# Patient Record
Sex: FEMALE | Race: WHITE | NOT HISPANIC OR LATINO | Employment: OTHER | URBAN - METROPOLITAN AREA
[De-identification: names, ages, dates, MRNs, and addresses within clinical notes are randomized per-mention and may not be internally consistent; named-entity substitution may affect disease eponyms.]

---

## 2017-06-19 ENCOUNTER — APPOINTMENT (OUTPATIENT)
Dept: PHYSICAL THERAPY | Facility: MEDICAL CENTER | Age: 72
End: 2017-06-19
Payer: MEDICARE

## 2017-06-19 PROCEDURE — 97163 PT EVAL HIGH COMPLEX 45 MIN: CPT

## 2017-06-19 PROCEDURE — G8990 OTHER PT/OT CURRENT STATUS: HCPCS

## 2017-06-19 PROCEDURE — 97112 NEUROMUSCULAR REEDUCATION: CPT

## 2017-06-19 PROCEDURE — G8991 OTHER PT/OT GOAL STATUS: HCPCS

## 2017-06-23 ENCOUNTER — APPOINTMENT (OUTPATIENT)
Dept: PHYSICAL THERAPY | Facility: MEDICAL CENTER | Age: 72
End: 2017-06-23
Payer: MEDICARE

## 2017-06-23 PROCEDURE — 97112 NEUROMUSCULAR REEDUCATION: CPT

## 2017-06-23 PROCEDURE — 97140 MANUAL THERAPY 1/> REGIONS: CPT

## 2017-06-23 PROCEDURE — 97010 HOT OR COLD PACKS THERAPY: CPT

## 2017-06-26 ENCOUNTER — APPOINTMENT (OUTPATIENT)
Dept: PHYSICAL THERAPY | Facility: MEDICAL CENTER | Age: 72
End: 2017-06-26
Payer: MEDICARE

## 2017-06-26 PROCEDURE — 97112 NEUROMUSCULAR REEDUCATION: CPT

## 2017-06-26 PROCEDURE — 97110 THERAPEUTIC EXERCISES: CPT

## 2017-07-03 ENCOUNTER — APPOINTMENT (OUTPATIENT)
Dept: PHYSICAL THERAPY | Facility: MEDICAL CENTER | Age: 72
End: 2017-07-03
Payer: MEDICARE

## 2017-07-03 PROCEDURE — 97112 NEUROMUSCULAR REEDUCATION: CPT

## 2017-07-03 PROCEDURE — 97140 MANUAL THERAPY 1/> REGIONS: CPT

## 2017-07-07 ENCOUNTER — APPOINTMENT (OUTPATIENT)
Dept: PHYSICAL THERAPY | Facility: MEDICAL CENTER | Age: 72
End: 2017-07-07
Payer: MEDICARE

## 2017-07-07 PROCEDURE — 97140 MANUAL THERAPY 1/> REGIONS: CPT

## 2017-07-07 PROCEDURE — 97110 THERAPEUTIC EXERCISES: CPT

## 2017-07-17 ENCOUNTER — APPOINTMENT (OUTPATIENT)
Dept: PHYSICAL THERAPY | Facility: MEDICAL CENTER | Age: 72
End: 2017-07-17
Payer: MEDICARE

## 2017-07-17 PROCEDURE — 97140 MANUAL THERAPY 1/> REGIONS: CPT

## 2017-07-17 PROCEDURE — 97110 THERAPEUTIC EXERCISES: CPT

## 2017-07-20 ENCOUNTER — APPOINTMENT (OUTPATIENT)
Dept: PHYSICAL THERAPY | Facility: MEDICAL CENTER | Age: 72
End: 2017-07-20
Payer: MEDICARE

## 2017-07-20 PROCEDURE — 97110 THERAPEUTIC EXERCISES: CPT

## 2017-07-20 PROCEDURE — 97140 MANUAL THERAPY 1/> REGIONS: CPT

## 2017-07-24 ENCOUNTER — APPOINTMENT (OUTPATIENT)
Dept: PHYSICAL THERAPY | Facility: MEDICAL CENTER | Age: 72
End: 2017-07-24
Payer: MEDICARE

## 2017-07-24 PROCEDURE — 97110 THERAPEUTIC EXERCISES: CPT

## 2017-07-24 PROCEDURE — 97140 MANUAL THERAPY 1/> REGIONS: CPT

## 2017-07-28 ENCOUNTER — APPOINTMENT (OUTPATIENT)
Dept: PHYSICAL THERAPY | Facility: MEDICAL CENTER | Age: 72
End: 2017-07-28
Payer: MEDICARE

## 2017-08-01 ENCOUNTER — APPOINTMENT (OUTPATIENT)
Dept: PHYSICAL THERAPY | Facility: MEDICAL CENTER | Age: 72
End: 2017-08-01
Payer: MEDICARE

## 2017-08-01 PROCEDURE — 97110 THERAPEUTIC EXERCISES: CPT

## 2017-08-01 PROCEDURE — 97140 MANUAL THERAPY 1/> REGIONS: CPT

## 2017-08-01 PROCEDURE — 97112 NEUROMUSCULAR REEDUCATION: CPT

## 2017-08-04 ENCOUNTER — APPOINTMENT (OUTPATIENT)
Dept: LAB | Facility: HOSPITAL | Age: 72
End: 2017-08-04
Payer: MEDICARE

## 2017-08-04 ENCOUNTER — APPOINTMENT (OUTPATIENT)
Dept: PHYSICAL THERAPY | Facility: MEDICAL CENTER | Age: 72
End: 2017-08-04
Payer: MEDICARE

## 2017-08-04 ENCOUNTER — OFFICE VISIT (OUTPATIENT)
Dept: URGENT CARE | Facility: MEDICAL CENTER | Age: 72
End: 2017-08-04
Payer: MEDICARE

## 2017-08-04 DIAGNOSIS — R31.29 OTHER MICROSCOPIC HEMATURIA: ICD-10-CM

## 2017-08-04 DIAGNOSIS — R39.15 URGENCY OF URINATION: ICD-10-CM

## 2017-08-04 PROCEDURE — 97112 NEUROMUSCULAR REEDUCATION: CPT

## 2017-08-04 PROCEDURE — 81002 URINALYSIS NONAUTO W/O SCOPE: CPT

## 2017-08-04 PROCEDURE — 99203 OFFICE O/P NEW LOW 30 MIN: CPT

## 2017-08-04 PROCEDURE — 97140 MANUAL THERAPY 1/> REGIONS: CPT

## 2017-08-04 PROCEDURE — G0463 HOSPITAL OUTPT CLINIC VISIT: HCPCS

## 2017-08-04 PROCEDURE — 97110 THERAPEUTIC EXERCISES: CPT

## 2017-08-04 PROCEDURE — 87086 URINE CULTURE/COLONY COUNT: CPT

## 2017-08-05 LAB — BACTERIA UR CULT: NORMAL

## 2017-08-07 ENCOUNTER — APPOINTMENT (OUTPATIENT)
Dept: PHYSICAL THERAPY | Facility: MEDICAL CENTER | Age: 72
End: 2017-08-07
Payer: MEDICARE

## 2017-08-07 PROCEDURE — 97112 NEUROMUSCULAR REEDUCATION: CPT

## 2017-08-07 PROCEDURE — 97140 MANUAL THERAPY 1/> REGIONS: CPT

## 2017-08-11 ENCOUNTER — APPOINTMENT (OUTPATIENT)
Dept: PHYSICAL THERAPY | Facility: MEDICAL CENTER | Age: 72
End: 2017-08-11
Payer: MEDICARE

## 2017-08-11 PROCEDURE — G8991 OTHER PT/OT GOAL STATUS: HCPCS

## 2017-08-11 PROCEDURE — 97112 NEUROMUSCULAR REEDUCATION: CPT

## 2017-08-11 PROCEDURE — G8992 OTHER PT/OT  D/C STATUS: HCPCS

## 2017-08-11 PROCEDURE — 97140 MANUAL THERAPY 1/> REGIONS: CPT

## 2017-08-17 ENCOUNTER — ALLSCRIPTS OFFICE VISIT (OUTPATIENT)
Dept: OTHER | Facility: OTHER | Age: 72
End: 2017-08-17

## 2017-08-17 LAB
BILIRUB UR QL STRIP: ABNORMAL
CLARITY UR: ABNORMAL
COLOR UR: YELLOW
GLUCOSE (HISTORICAL): ABNORMAL
HGB UR QL STRIP.AUTO: ABNORMAL
KETONES UR STRIP-MCNC: ABNORMAL MG/DL
LEUKOCYTE ESTERASE UR QL STRIP: ABNORMAL
NITRITE UR QL STRIP: ABNORMAL
PH UR STRIP.AUTO: 5.5 [PH]
PROT UR STRIP-MCNC: ABNORMAL MG/DL
SP GR UR STRIP.AUTO: <=1.005
UROBILINOGEN UR QL STRIP.AUTO: 0.2

## 2017-09-15 ENCOUNTER — ANESTHESIA EVENT (OUTPATIENT)
Dept: PERIOP | Facility: HOSPITAL | Age: 72
End: 2017-09-15
Payer: MEDICARE

## 2017-09-15 RX ORDER — SODIUM CHLORIDE 9 MG/ML
125 INJECTION, SOLUTION INTRAVENOUS CONTINUOUS
Status: CANCELLED | OUTPATIENT
Start: 2017-10-04

## 2017-09-18 ENCOUNTER — APPOINTMENT (OUTPATIENT)
Dept: PREADMISSION TESTING | Facility: HOSPITAL | Age: 72
End: 2017-09-18
Payer: MEDICARE

## 2017-09-18 ENCOUNTER — HOSPITAL ENCOUNTER (OUTPATIENT)
Dept: NON INVASIVE DIAGNOSTICS | Facility: HOSPITAL | Age: 72
Discharge: HOME | End: 2017-09-18
Attending: UROLOGY
Payer: MEDICARE

## 2017-09-18 ENCOUNTER — HOSPITAL ENCOUNTER (OUTPATIENT)
Dept: ULTRASOUND IMAGING | Facility: HOSPITAL | Age: 72
Discharge: HOME | End: 2017-09-18
Attending: UROLOGY
Payer: MEDICARE

## 2017-09-18 ENCOUNTER — APPOINTMENT (OUTPATIENT)
Dept: LAB | Facility: HOSPITAL | Age: 72
End: 2017-09-18
Attending: UROLOGY
Payer: MEDICARE

## 2017-09-18 ENCOUNTER — TRANSCRIBE ORDERS (OUTPATIENT)
Dept: ADMINISTRATIVE | Facility: HOSPITAL | Age: 72
End: 2017-09-18

## 2017-09-18 VITALS
HEART RATE: 74 BPM | HEIGHT: 61 IN | BODY MASS INDEX: 24.73 KG/M2 | RESPIRATION RATE: 18 BRPM | DIASTOLIC BLOOD PRESSURE: 60 MMHG | WEIGHT: 131 LBS | SYSTOLIC BLOOD PRESSURE: 110 MMHG | TEMPERATURE: 96.5 F

## 2017-09-18 DIAGNOSIS — R31.29 OTHER MICROSCOPIC HEMATURIA: ICD-10-CM

## 2017-09-18 DIAGNOSIS — R31.21 ASYMPTOMATIC MICROSCOPIC HEMATURIA: ICD-10-CM

## 2017-09-18 DIAGNOSIS — R31.21 ASYMPTOMATIC MICROSCOPIC HEMATURIA: Primary | ICD-10-CM

## 2017-09-18 LAB
ALBUMIN SERPL BCP-MCNC: 3.8 G/DL (ref 3.5–5)
ALP SERPL-CCNC: 47 U/L (ref 46–116)
ALT SERPL W P-5'-P-CCNC: 26 U/L (ref 12–78)
ANION GAP SERPL CALCULATED.3IONS-SCNC: 5 MMOL/L (ref 4–13)
AST SERPL W P-5'-P-CCNC: 23 U/L (ref 5–45)
ATRIAL RATE: 64 BPM
BASOPHILS # BLD AUTO: 0.01 THOUSANDS/ΜL (ref 0–0.1)
BASOPHILS NFR BLD AUTO: 0 % (ref 0–1)
BILIRUB SERPL-MCNC: 0.19 MG/DL (ref 0.2–1)
BUN SERPL-MCNC: 11 MG/DL (ref 5–25)
CALCIUM SERPL-MCNC: 9.4 MG/DL (ref 8.3–10.1)
CHLORIDE SERPL-SCNC: 102 MMOL/L (ref 100–108)
CO2 SERPL-SCNC: 31 MMOL/L (ref 21–32)
CREAT SERPL-MCNC: 0.56 MG/DL (ref 0.6–1.3)
EOSINOPHIL # BLD AUTO: 0.09 THOUSAND/ΜL (ref 0–0.61)
EOSINOPHIL NFR BLD AUTO: 1 % (ref 0–6)
ERYTHROCYTE [DISTWIDTH] IN BLOOD BY AUTOMATED COUNT: 12 % (ref 11.6–15.1)
GFR SERPL CREATININE-BSD FRML MDRD: 95 ML/MIN/1.73SQ M
GLUCOSE SERPL-MCNC: 90 MG/DL (ref 65–140)
HCT VFR BLD AUTO: 41.2 % (ref 34.8–46.1)
HGB BLD-MCNC: 13.6 G/DL (ref 11.5–15.4)
LYMPHOCYTES # BLD AUTO: 1.82 THOUSANDS/ΜL (ref 0.6–4.47)
LYMPHOCYTES NFR BLD AUTO: 27 % (ref 14–44)
MCH RBC QN AUTO: 32 PG (ref 26.8–34.3)
MCHC RBC AUTO-ENTMCNC: 33 G/DL (ref 31.4–37.4)
MCV RBC AUTO: 97 FL (ref 82–98)
MONOCYTES # BLD AUTO: 0.52 THOUSAND/ΜL (ref 0.17–1.22)
MONOCYTES NFR BLD AUTO: 8 % (ref 4–12)
NEUTROPHILS # BLD AUTO: 4.23 THOUSANDS/ΜL (ref 1.85–7.62)
NEUTS SEG NFR BLD AUTO: 64 % (ref 43–75)
NRBC BLD AUTO-RTO: 0 /100 WBCS
P AXIS: 61 DEGREES
PLATELET # BLD AUTO: 274 THOUSANDS/UL (ref 149–390)
PMV BLD AUTO: 9.4 FL (ref 8.9–12.7)
POTASSIUM SERPL-SCNC: 3.7 MMOL/L (ref 3.5–5.3)
PR INTERVAL: 184 MS
PROT SERPL-MCNC: 7.7 G/DL (ref 6.4–8.2)
QRS AXIS: -55 DEGREES
QRSD INTERVAL: 110 MS
QT INTERVAL: 396 MS
QTC INTERVAL: 408 MS
RBC # BLD AUTO: 4.25 MILLION/UL (ref 3.81–5.12)
SODIUM SERPL-SCNC: 138 MMOL/L (ref 136–145)
T WAVE AXIS: 26 DEGREES
VENTRICULAR RATE: 64 BPM
WBC # BLD AUTO: 6.67 THOUSAND/UL (ref 4.31–10.16)

## 2017-09-18 PROCEDURE — 85025 COMPLETE CBC W/AUTO DIFF WBC: CPT

## 2017-09-18 PROCEDURE — 93005 ELECTROCARDIOGRAM TRACING: CPT

## 2017-09-18 PROCEDURE — 76770 US EXAM ABDO BACK WALL COMP: CPT

## 2017-09-18 PROCEDURE — 80053 COMPREHEN METABOLIC PANEL: CPT

## 2017-09-18 PROCEDURE — 36415 COLL VENOUS BLD VENIPUNCTURE: CPT

## 2017-09-18 PROCEDURE — 87086 URINE CULTURE/COLONY COUNT: CPT

## 2017-09-18 RX ORDER — LEVOTHYROXINE SODIUM 0.05 MG/1
75 TABLET ORAL
COMMUNITY
End: 2019-10-23 | Stop reason: ALTCHOICE

## 2017-09-18 RX ORDER — ACETAMINOPHEN 500 MG
500-1000 TABLET ORAL EVERY 6 HOURS PRN
COMMUNITY
End: 2018-06-08

## 2017-09-18 RX ORDER — PHENAZOPYRIDINE HYDROCHLORIDE 95 MG/1
95 TABLET ORAL 3 TIMES DAILY PRN
COMMUNITY
End: 2018-06-08

## 2017-09-18 RX ORDER — OMEGA-3 FATTY ACIDS CAP DELAYED RELEASE 1000 MG 1000 MG
1 CAPSULE DELAYED RELEASE ORAL DAILY
COMMUNITY
End: 2018-03-28

## 2017-09-18 RX ORDER — CLONAZEPAM 0.5 MG/1
0.5 TABLET ORAL AS NEEDED
COMMUNITY
End: 2018-06-08

## 2017-09-18 NOTE — ANESTHESIA PREPROCEDURE EVALUATION
Review of Systems/Medical History  Patient summary reviewed  Chart reviewed  History of anesthetic complications (does well with Propofol sedation) PONV    Cardiovascular  Negative cardio ROS    Pulmonary  Negative pulmonary ROS ,        GI/Hepatic  Negative GI/hepatic ROS            Comment: Chronic IC     Endo/Other  History of thyroid disease , hypothyroidism,      GYN  Negative gynecology ROS          Hematology  Negative hematology ROS      Musculoskeletal  Negative musculoskeletal ROS        Neurology      Comment: fibromyalgia Psychology   Negative psychology ROS            Physical Exam    Airway    Mallampati score: II  TM Distance: >3 FB  Neck ROM: full     Dental   Comment: Many crowns including all of top center  One temporary upper crown #11, prominent overbite,     Cardiovascular  Comment: Negative ROS, Rhythm: regular, Rate: normal, Cardiovascular exam normal    Pulmonary  Pulmonary exam normal Breath sounds clear to auscultation,     Other Findings        Anesthesia Plan  ASA Score- 2       Anesthesia Type- IV sedation with anesthesia  Comment: Says "have benadryl ready"    Sometimes gets a little "itchy" with IVS  Patient says she does well with ondansetron      Induction- intravenous      Informed Consent  Anesthetic plan and risks discussed with patient

## 2017-09-19 LAB — BACTERIA UR CULT: NORMAL

## 2017-10-04 ENCOUNTER — HOSPITAL ENCOUNTER (OUTPATIENT)
Facility: HOSPITAL | Age: 72
Setting detail: OUTPATIENT SURGERY
Discharge: HOME | End: 2017-10-04
Attending: UROLOGY | Admitting: UROLOGY
Payer: MEDICARE

## 2017-10-04 ENCOUNTER — ANESTHESIA (OUTPATIENT)
Dept: PERIOP | Facility: HOSPITAL | Age: 72
End: 2017-10-04
Payer: MEDICARE

## 2017-10-04 VITALS
OXYGEN SATURATION: 99 % | SYSTOLIC BLOOD PRESSURE: 115 MMHG | HEIGHT: 61 IN | DIASTOLIC BLOOD PRESSURE: 69 MMHG | BODY MASS INDEX: 24 KG/M2 | HEART RATE: 73 BPM | TEMPERATURE: 97.7 F | RESPIRATION RATE: 16 BRPM | WEIGHT: 127.13 LBS

## 2017-10-04 RX ORDER — MAGNESIUM HYDROXIDE 1200 MG/15ML
LIQUID ORAL AS NEEDED
Status: DISCONTINUED | OUTPATIENT
Start: 2017-10-04 | End: 2017-10-04 | Stop reason: HOSPADM

## 2017-10-04 RX ORDER — PROPOFOL 10 MG/ML
INJECTION, EMULSION INTRAVENOUS CONTINUOUS PRN
Status: DISCONTINUED | OUTPATIENT
Start: 2017-10-04 | End: 2017-10-04 | Stop reason: SURG

## 2017-10-04 RX ORDER — PROPOFOL 10 MG/ML
INJECTION, EMULSION INTRAVENOUS AS NEEDED
Status: DISCONTINUED | OUTPATIENT
Start: 2017-10-04 | End: 2017-10-04 | Stop reason: SURG

## 2017-10-04 RX ORDER — ONDANSETRON 2 MG/ML
INJECTION INTRAMUSCULAR; INTRAVENOUS AS NEEDED
Status: DISCONTINUED | OUTPATIENT
Start: 2017-10-04 | End: 2017-10-04 | Stop reason: SURG

## 2017-10-04 RX ORDER — SODIUM CHLORIDE 9 MG/ML
125 INJECTION, SOLUTION INTRAVENOUS CONTINUOUS
Status: DISCONTINUED | OUTPATIENT
Start: 2017-10-04 | End: 2017-10-04 | Stop reason: HOSPADM

## 2017-10-04 RX ORDER — MIDAZOLAM HYDROCHLORIDE 1 MG/ML
INJECTION INTRAMUSCULAR; INTRAVENOUS AS NEEDED
Status: DISCONTINUED | OUTPATIENT
Start: 2017-10-04 | End: 2017-10-04 | Stop reason: SURG

## 2017-10-04 RX ORDER — FENTANYL CITRATE 50 UG/ML
INJECTION, SOLUTION INTRAMUSCULAR; INTRAVENOUS AS NEEDED
Status: DISCONTINUED | OUTPATIENT
Start: 2017-10-04 | End: 2017-10-04 | Stop reason: SURG

## 2017-10-04 RX ORDER — ONDANSETRON 2 MG/ML
4 INJECTION INTRAMUSCULAR; INTRAVENOUS EVERY 6 HOURS PRN
Status: DISCONTINUED | OUTPATIENT
Start: 2017-10-04 | End: 2017-10-04 | Stop reason: HOSPADM

## 2017-10-04 RX ORDER — HYDROCODONE BITARTRATE AND ACETAMINOPHEN 5; 325 MG/1; MG/1
1 TABLET ORAL EVERY 6 HOURS PRN
Status: DISCONTINUED | OUTPATIENT
Start: 2017-10-04 | End: 2017-10-04 | Stop reason: HOSPADM

## 2017-10-04 RX ORDER — FENTANYL CITRATE 50 UG/ML
25 INJECTION, SOLUTION INTRAMUSCULAR; INTRAVENOUS
Status: DISCONTINUED | OUTPATIENT
Start: 2017-10-04 | End: 2017-10-04 | Stop reason: HOSPADM

## 2017-10-04 RX ORDER — PHENAZOPYRIDINE HYDROCHLORIDE 200 MG/1
200 TABLET, FILM COATED ORAL
Status: DISCONTINUED | OUTPATIENT
Start: 2017-10-04 | End: 2017-10-04 | Stop reason: HOSPADM

## 2017-10-04 RX ADMIN — DEXAMETHASONE SODIUM PHOSPHATE 10 MG: 10 INJECTION INTRAMUSCULAR; INTRAVENOUS at 08:44

## 2017-10-04 RX ADMIN — PROPOFOL 50 MG: 10 INJECTION, EMULSION INTRAVENOUS at 08:26

## 2017-10-04 RX ADMIN — MIDAZOLAM HYDROCHLORIDE 2 MG: 1 INJECTION, SOLUTION INTRAMUSCULAR; INTRAVENOUS at 08:22

## 2017-10-04 RX ADMIN — HYDROCODONE BITARTRATE AND ACETAMINOPHEN 0.5 TABLET: 5; 325 TABLET ORAL at 11:27

## 2017-10-04 RX ADMIN — PROPOFOL 50 MCG/KG/MIN: 10 INJECTION, EMULSION INTRAVENOUS at 08:28

## 2017-10-04 RX ADMIN — SODIUM CHLORIDE 125 ML/HR: 0.9 INJECTION, SOLUTION INTRAVENOUS at 07:22

## 2017-10-04 RX ADMIN — CEFAZOLIN SODIUM 2000 MG: 2 SOLUTION INTRAVENOUS at 08:06

## 2017-10-04 RX ADMIN — CEFAZOLIN SODIUM 2000 MG: 2 SOLUTION INTRAVENOUS at 08:22

## 2017-10-04 RX ADMIN — ONDANSETRON HYDROCHLORIDE 4 MG: 2 INJECTION, SOLUTION INTRAVENOUS at 08:44

## 2017-10-04 RX ADMIN — HYDROCODONE BITARTRATE AND ACETAMINOPHEN 0.5 TABLET: 5; 325 TABLET ORAL at 10:41

## 2017-10-04 RX ADMIN — PHENAZOPYRIDINE HYDROCHLORIDE 200 MG: 200 TABLET ORAL at 09:55

## 2017-10-04 RX ADMIN — FENTANYL CITRATE 50 MCG: 50 INJECTION, SOLUTION INTRAMUSCULAR; INTRAVENOUS at 08:28

## 2017-10-04 NOTE — OP NOTE
OPERATIVE REPORT  PATIENT NAME: Shaina Barber    :  1945  MRN: [id number]  Pt Location: [facility] OR ROOM 03    SURGERY DATE: 10/4/2017    Surgeon(s) and Role:     * Constanza Lyle MD - Primary    Preop Diagnosis:  Microscopic hematuria [R31 29]    Post-Op Diagnosis Codes:     * Microscopic hematuria [R31 29]    Procedure:  Rigid cystoscopy in the operating room    Specimen(s):  None    Estimated Blood Loss:   None    Drains:  None       Anesthesia Type:   IV Sedation with Anesthesia    Operative Indications:  Microscopic hematuria [R31 29]      Operative Findings:  Normal bladder and urethra    Complications:   None    Procedure and Technique:  The patient is a 57-year-old female with a history of interstitial cystitis  She also has chronic pelvic pain  She has had InterStim generator placed help control the urgency frequency due to the IC  Urinalysis shows microscopic hematuria  She has not undergone cystoscopy for quite a while  She could not tolerate in the office so we will do it under mac in the operating room  The risks of bleeding infection and damage to the urinary tract were explained, along with increased pain symptoms  The patient was brought to the operating room and identified properly  Mac anesthesia was induced the patient was prepped and draped in dorsal lithotomy position in the usual fashion a time-out was performed  Cystoscopy was carried out with a 22 Frisian cystoscopy sheath and 30 and 70 degree lens  The urethra was normal without stricture  The bladder was smooth, not trabeculated there are no stones tumors or other the orifices were orthotopic and intact  The bladder appeared to be of normal capacity  Once this was done, the bladder was drained     I was present for the entire procedure and A qualified resident physician was not available    Patient Disposition:  PACU  and hemodynamically stable    SIGNATURE: Constanza Lyle MD  DATE: 2017  TIME: 8:29 AM

## 2018-01-08 ENCOUNTER — ALLSCRIPTS OFFICE VISIT (OUTPATIENT)
Dept: OTHER | Facility: OTHER | Age: 73
End: 2018-01-08

## 2018-01-08 DIAGNOSIS — N20.0 CALCULUS OF KIDNEY: ICD-10-CM

## 2018-01-08 LAB
BILIRUB UR QL STRIP: NEGATIVE
CLARITY UR: NORMAL
COLOR UR: YELLOW
GLUCOSE (HISTORICAL): NEGATIVE
HGB UR QL STRIP.AUTO: NORMAL
KETONES UR STRIP-MCNC: NEGATIVE MG/DL
LEUKOCYTE ESTERASE UR QL STRIP: NEGATIVE
NITRITE UR QL STRIP: NEGATIVE
PH UR STRIP.AUTO: 5 [PH]
PROT UR STRIP-MCNC: NEGATIVE MG/DL
SP GR UR STRIP.AUTO: 1
UROBILINOGEN UR QL STRIP.AUTO: 0.2

## 2018-01-10 ENCOUNTER — HOSPITAL ENCOUNTER (OUTPATIENT)
Dept: CT IMAGING | Facility: HOSPITAL | Age: 73
Discharge: HOME | End: 2018-01-10
Attending: UROLOGY
Payer: MEDICARE

## 2018-01-10 DIAGNOSIS — N20.0 CALCULUS OF KIDNEY: ICD-10-CM

## 2018-01-10 PROCEDURE — 74176 CT ABD & PELVIS W/O CONTRAST: CPT

## 2018-01-14 VITALS
BODY MASS INDEX: 23.37 KG/M2 | DIASTOLIC BLOOD PRESSURE: 78 MMHG | WEIGHT: 127 LBS | SYSTOLIC BLOOD PRESSURE: 116 MMHG | HEIGHT: 62 IN

## 2018-01-23 VITALS
SYSTOLIC BLOOD PRESSURE: 118 MMHG | BODY MASS INDEX: 22.63 KG/M2 | WEIGHT: 123 LBS | DIASTOLIC BLOOD PRESSURE: 64 MMHG | HEIGHT: 62 IN

## 2018-03-28 ENCOUNTER — OFFICE VISIT (OUTPATIENT)
Dept: GASTROENTEROLOGY | Facility: MEDICAL CENTER | Age: 73
End: 2018-03-28
Payer: MEDICARE

## 2018-03-28 VITALS
DIASTOLIC BLOOD PRESSURE: 72 MMHG | HEIGHT: 61 IN | WEIGHT: 122 LBS | TEMPERATURE: 97 F | SYSTOLIC BLOOD PRESSURE: 106 MMHG | BODY MASS INDEX: 23.03 KG/M2 | HEART RATE: 83 BPM

## 2018-03-28 DIAGNOSIS — K21.9 GASTROESOPHAGEAL REFLUX DISEASE, ESOPHAGITIS PRESENCE NOT SPECIFIED: ICD-10-CM

## 2018-03-28 DIAGNOSIS — R14.0 ABDOMINAL BLOATING: ICD-10-CM

## 2018-03-28 DIAGNOSIS — K59.01 SLOW TRANSIT CONSTIPATION: Primary | ICD-10-CM

## 2018-03-28 DIAGNOSIS — R11.2 NAUSEA AND VOMITING, INTRACTABILITY OF VOMITING NOT SPECIFIED, UNSPECIFIED VOMITING TYPE: ICD-10-CM

## 2018-03-28 PROBLEM — R39.15 URINARY URGENCY: Status: ACTIVE | Noted: 2017-08-04

## 2018-03-28 PROBLEM — R30.0 DIFFICULT OR PAINFUL URINATION: Status: ACTIVE | Noted: 2017-08-04

## 2018-03-28 PROBLEM — N30.10 IC (INTERSTITIAL CYSTITIS): Status: ACTIVE | Noted: 2017-08-04

## 2018-03-28 PROCEDURE — 99204 OFFICE O/P NEW MOD 45 MIN: CPT | Performed by: INTERNAL MEDICINE

## 2018-03-28 RX ORDER — DICYCLOMINE HYDROCHLORIDE 10 MG/1
10 CAPSULE ORAL
Qty: 120 CAPSULE | Refills: 3 | Status: SHIPPED | OUTPATIENT
Start: 2018-03-28 | End: 2018-04-20 | Stop reason: SDUPTHER

## 2018-03-28 RX ORDER — POLYETHYLENE GLYCOL 3350 17 G/17G
17 POWDER, FOR SOLUTION ORAL DAILY
Qty: 14 EACH | Refills: 0 | Status: SHIPPED | OUTPATIENT
Start: 2018-03-28 | End: 2018-04-20 | Stop reason: SDUPTHER

## 2018-03-28 RX ORDER — BENZONATATE 100 MG/1
100 CAPSULE ORAL 3 TIMES DAILY
COMMUNITY
Start: 2018-03-20 | End: 2018-03-30

## 2018-03-28 RX ORDER — GUAIFENESIN 600 MG
600 TABLET, EXTENDED RELEASE 12 HR ORAL
Status: ON HOLD | COMMUNITY
Start: 2018-03-20 | End: 2018-05-25 | Stop reason: ALTCHOICE

## 2018-03-28 NOTE — LETTER
March 28, 2018     Pamela Daniels [address] [address] [address] [address]    Patient: Berenice Wiseman   YOB: 1945   Date of Visit: 3/28/2018       Dear Dr Rebecca Carranza:    Thank you for referring Anisha Frank to me for evaluation  Below are my notes for this consultation  If you have questions, please do not hesitate to call me  I look forward to following your patient along with you  Sincerely,      JAYANT An  [facility]  Mobile: [phone]  Available on Volance  [email or web address]  [email or web address]             CC: No Recipients  Daniel Pereira MD  3/28/2018 10:29 AM  Sign at close encounter  [facility] - Outpatient Consultation  Berenice Wiseman 81 y o  female MRN: [id number]  Encounter: [id number]          ASSESSMENT AND PLAN:      1  Slow Transit constipation  2  Abdominal bloating  Long standing constipation contributed to by recent viral illness + pelvic floor dysfunction  She follows with Dr Ren in gynecology for pelvic floor and has a visit with him tomorrow  Start miralax, instructed to titrate the dose to effect  Prescribed bentyl as needed, she will take a half dose and monitor for signs of CNS disturbances which she is at risk for, given her age  She is encouraged to increase her hydration and dietary fiber, goal 25-40 grams/day  I have given her handouts on a low FODMAP diet + dietary fiber  - polyethylene glycol (MIRALAX) 17 g packet; Take 17 g by mouth daily  Dispense: 14 each; Refill: 0  - dicyclomine (BENTYL) 10 mg capsule; Take 1 capsule (10 mg total) by mouth 4 (four) times a day (before meals and at bedtime) for 30 days  Dispense: 120 capsule; Refill: 3    3   Nausea and vomiting, intractability of vomiting not specified, unspecified vomiting type  Concern for gastroparesis given slow transit constipation  Evaluate with gastric emptying study  Symptoms may also be related to underlying constipation, which we are treating  - NM gastric emptying; Future  - polyethylene glycol (MIRALAX) 17 g packet; Take 17 g by mouth daily  Dispense: 14 each; Refill: 0    4  Gastroesophageal reflux disease, esophagitis presence not specified  Anti-reflux measures discussed, she will evaluate her food triggers and attempt to avoid these  ______________________________________________________________________    HPI:      Patient is a 78year old female referred to me for evaluation of constipation, abdominal bloating, nausea/vomiting, and reflux  She has a past medical history of fibromyalgia, myofascial pain, interstitial cystitis, and pelvic floor dysfunction  She relates a "life long history of GI disturbances " She has constipation, with bowel movements spontaneously occurring once every 2-3 days and with passage of hard stools, straining, and sensation of incomplete evacuation  She has increased nausea and vomiting associated with oral intake, primarily after rich meals  She limits her diet to frozen peas and carrots and avoids dairy products due to concern of a "dairy allergy " Constipation is associated with gas/bloating, symptoms of early satiety, and frequent nausea with occasional vomiting of undigested foods  She took multiple pills of dulcolax several days ago, which resulted in explosive diarrhea  She also had severe diarrhea with attempts of linzess, but is unsure of which dosage she was previously prescribed  She frequently uses cachecin gum, which she purchases online to treat her constipation at home  Most recently, her chronic symptoms were exacerbated by diagnosis of influenza B and a trip to [facility]  She reports feeling dizzy and lightheaded at times related to dehydration from the recent viral illness  She has also experienced increased reflux symptoms since her influenza diagnosis  She denies unintentional weight loss        REVIEW OF SYSTEMS:    CONSTITUTIONAL: Denies any fever, chills, rigors, and weight loss   HEENT: No earache or tinnitus  Denies hearing loss or visual disturbances  CARDIOVASCULAR: No chest pain or palpitations  RESPIRATORY: Denies any cough, hemoptysis, shortness of breath or dyspnea on exertion  GASTROINTESTINAL: As noted in the History of Present Illness  GENITOURINARY: No problems with urination  Denies any hematuria or dysuria  NEUROLOGIC: No dizziness or vertigo, denies headaches  MUSCULOSKELETAL: + diffuse muscle pain from fibromyalgia/myofascial pain  SKIN: Denies skin rashes or itching  ENDOCRINE: Denies excessive thirst  Denies intolerance to heat or cold  PSYCHOSOCIAL: Denies depression or anxiety  Denies any recent memory loss  Historical Information   Past Medical History:   Diagnosis Date    Allergy to adhesive tape     tears skin    Arthritis     Fibromyalgia, primary     Hypothyroid     Interstitial cystitis     has interStim    Irritable bowel syndrome     Kidney stone     Microscopic hematuria     Other allergy, initial encounter     allergy to blue dye    PONV (postoperative nausea and vomiting)     and pruritis    Rosacea     Spondylisthesis     Wears dentures     lower partial    Wears glasses      Past Surgical History:   Procedure Laterality Date    APPENDECTOMY      CATARACT EXTRACTION Bilateral     COLONOSCOPY      HYSTERECTOMY      OTHER SURGICAL HISTORY Left     limb lengthening    OTHER SURGICAL HISTORY      interStim    OTHER SURGICAL HISTORY      tendon contracture right hip    [place] CYSTOURETHROSCOPY N/A 10/4/2017    Procedure: CYSTOSCOPY;  Surgeon: Dustin Sanchez MD;  Location: [facility];  Service: Urology    TONSILLECTOMY       Social History   History   Alcohol Use    Yes     Comment: few x year     History   Drug Use No     History   Smoking Status    Never Smoker   Smokeless Tobacco    Never Used     No family history on file      Meds/Allergies       Current Outpatient Prescriptions:     acetaminophen (TYLENOL) 500 mg tablet    benzonatate (TESSALON PERLES) 100 mg capsule    Calcium Carbonate (CALCIUM 600 PO)    clonazePAM (KlonoPIN) 0 5 mg tablet    guaiFENesin (MUCINEX) 600 mg 12 hr tablet    levothyroxine 75 mcg tablet    phenazopyridine (PYRIDIUM) 95 MG tablet    dicyclomine (BENTYL) 10 mg capsule    polyethylene glycol (MIRALAX) 17 g packet    Allergies   Allergen Reactions    Sulfa Antibiotics Rash    Blue Dyes (Parenteral) Hives    Nitrofurantoin     Other      Adhesive tape causes blisters    Sulfate            Objective     Blood pressure 106/72, pulse 83, temperature (!) 97 °F (36 1 °C), temperature source Tympanic, height 5' 1" (1 549 m), weight 55 3 kg (122 lb), not currently breastfeeding  Body mass index is 23 05 kg/m²  PHYSICAL EXAM:      General Appearance:   Alert, cooperative, no distress   HEENT:   Normocephalic, atraumatic, anicteric      Neck:  Supple, symmetrical, trachea midline   Lungs:   Clear to auscultation bilaterally; no rales, rhonchi or wheezing; respirations unlabored    Heart[de-identified]   Regular rate and rhythm; no murmur, rub, or gallop     Abdomen:   Soft, + mild epigastric tenderness, non-distended; normal bowel sounds; no masses, no organomegaly    Genitalia:   Deferred    Rectal:   Deferred    Extremities:  No cyanosis, clubbing or edema    Pulses:  2+ and symmetric    Skin:  No jaundice, rashes, or lesions    Lymph nodes:  No palpable cervical lymphadenopathy        Lab Results:   Lab Results   Component Value Date    WBC 6 67 09/18/2017    HGB 13 6 09/18/2017    HCT 41 2 09/18/2017    MCV 97 09/18/2017     09/18/2017     Lab Results   Component Value Date     09/18/2017    K 3 7 09/18/2017     09/18/2017    CO2 31 09/18/2017    ANIONGAP 5 09/18/2017    BUN 11 09/18/2017    CREATININE 0 56 (L) 09/18/2017    GLUCOSE 90 09/18/2017    CALCIUM 9 4 09/18/2017    AST 23 09/18/2017    ALT 26 09/18/2017    ALKPHOS 47 09/18/2017    PROT 7 7 09/18/2017    BILITOT 0 19 (L) 09/18/2017    EGFR 95 09/18/2017         Radiology Results:   No results found

## 2018-03-28 NOTE — PROGRESS NOTES
[facility] - Outpatient Consultation  Therese Ham 81 y o  female MRN: [id number]  Encounter: [id number]          ASSESSMENT AND PLAN:      1  Slow Transit constipation  2  Abdominal bloating  Long standing constipation contributed to by recent viral illness + pelvic floor dysfunction  She follows with Dr Hector Maya in gynecology for pelvic floor and has a visit with him tomorrow  Start miralax, instructed to titrate the dose to effect  I gave her samples (4 pills each) of 72 mcg and 145 mcg of Linzess, she will try these if the miralax is not effective and get back to me if she would like a prescription  Prescribed bentyl as needed, she will take a half dose and monitor for signs of CNS disturbances which she is at risk for, given her age  She is encouraged to increase her hydration and dietary fiber, goal 25-40 grams/day  I have given her handouts on a low FODMAP diet + dietary fiber  - polyethylene glycol (MIRALAX) 17 g packet; Take 17 g by mouth daily  Dispense: 14 each; Refill: 0  - dicyclomine (BENTYL) 10 mg capsule; Take 1 capsule (10 mg total) by mouth 4 (four) times a day (before meals and at bedtime) for 30 days  Dispense: 120 capsule; Refill: 3    3  Nausea and vomiting, intractability of vomiting not specified, unspecified vomiting type  Concern for gastroparesis given slow transit constipation  Evaluate with gastric emptying study  Symptoms may also be related to underlying constipation, which we are treating  - NM gastric emptying; Future  - polyethylene glycol (MIRALAX) 17 g packet; Take 17 g by mouth daily  Dispense: 14 each; Refill: 0    4   Gastroesophageal reflux disease, esophagitis presence not specified  Anti-reflux measures discussed, she will evaluate her food triggers and attempt to avoid these  ______________________________________________________________________    HPI:      Patient is a 78year old female referred to me for evaluation of constipation, abdominal bloating, nausea/vomiting, and reflux  She has a past medical history of fibromyalgia, myofascial pain, interstitial cystitis, and pelvic floor dysfunction  She relates a "life long history of GI disturbances " She has constipation, with bowel movements spontaneously occurring once every 2-3 days and with passage of hard stools, straining, and sensation of incomplete evacuation  She has increased nausea and vomiting associated with oral intake, primarily after rich meals  She limits her diet to frozen peas and carrots and avoids dairy products due to concern of a "dairy allergy " Constipation is associated with gas/bloating, symptoms of early satiety, and frequent nausea with occasional vomiting of undigested foods  She took multiple pills of dulcolax several days ago, which resulted in explosive diarrhea  She also had severe diarrhea with attempts of linzess, but is unsure of which dosage she was previously prescribed  She frequently uses cachecin gum, which she purchases online to treat her constipation at home  Most recently, her chronic symptoms were exacerbated by diagnosis of influenza B and a trip to [facility]  She reports feeling dizzy and lightheaded at times related to dehydration from the recent viral illness  She has also experienced increased reflux symptoms since her influenza diagnosis  She denies unintentional weight loss  REVIEW OF SYSTEMS:    CONSTITUTIONAL: Denies any fever, chills, rigors, and weight loss  HEENT: No earache or tinnitus  Denies hearing loss or visual disturbances  CARDIOVASCULAR: No chest pain or palpitations  RESPIRATORY: Denies any cough, hemoptysis, shortness of breath or dyspnea on exertion  GASTROINTESTINAL: As noted in the History of Present Illness  GENITOURINARY: No problems with urination  Denies any hematuria or dysuria  NEUROLOGIC: No dizziness or vertigo, denies headaches     MUSCULOSKELETAL: + diffuse muscle pain from fibromyalgia/myofascial pain  SKIN: Denies skin rashes or itching  ENDOCRINE: Denies excessive thirst  Denies intolerance to heat or cold  PSYCHOSOCIAL: Denies depression or anxiety  Denies any recent memory loss  Historical Information   Past Medical History:   Diagnosis Date    Allergy to adhesive tape     tears skin    Arthritis     Fibromyalgia, primary     Hypothyroid     Interstitial cystitis     has interStim    Irritable bowel syndrome     Kidney stone     Microscopic hematuria     Other allergy, initial encounter     allergy to blue dye    PONV (postoperative nausea and vomiting)     and pruritis    Rosacea     Spondylisthesis     Wears dentures     lower partial    Wears glasses      Past Surgical History:   Procedure Laterality Date    APPENDECTOMY      CATARACT EXTRACTION Bilateral     COLONOSCOPY      HYSTERECTOMY      OTHER SURGICAL HISTORY Left     limb lengthening    OTHER SURGICAL HISTORY      interStim    OTHER SURGICAL HISTORY      tendon contracture right hip    [place] CYSTOURETHROSCOPY N/A 10/4/2017    Procedure: CYSTOSCOPY;  Surgeon: Kendra Beck MD;  Location: [facility] OR;  Service: Urology    TONSILLECTOMY       Social History   History   Alcohol Use    Yes     Comment: few x year     History   Drug Use No     History   Smoking Status    Never Smoker   Smokeless Tobacco    Never Used     No family history on file      Meds/Allergies       Current Outpatient Prescriptions:     acetaminophen (TYLENOL) 500 mg tablet    benzonatate (TESSALON PERLES) 100 mg capsule    Calcium Carbonate (CALCIUM 600 PO)    clonazePAM (KlonoPIN) 0 5 mg tablet    guaiFENesin (MUCINEX) 600 mg 12 hr tablet    levothyroxine 75 mcg tablet    phenazopyridine (PYRIDIUM) 95 MG tablet    dicyclomine (BENTYL) 10 mg capsule    polyethylene glycol (MIRALAX) 17 g packet    Allergies   Allergen Reactions    Sulfa Antibiotics Rash    Blue Dyes (Parenteral) Hives    Nitrofurantoin     Other      Adhesive tape causes blisters    Sulfate            Objective     Blood pressure 106/72, pulse 83, temperature (!) 97 °F (36 1 °C), temperature source Tympanic, height 5' 1" (1 549 m), weight 55 3 kg (122 lb), not currently breastfeeding  Body mass index is 23 05 kg/m²  PHYSICAL EXAM:      General Appearance:   Alert, cooperative, no distress   HEENT:   Normocephalic, atraumatic, anicteric      Neck:  Supple, symmetrical, trachea midline   Lungs:   Clear to auscultation bilaterally; no rales, rhonchi or wheezing; respirations unlabored    Heart[de-identified]   Regular rate and rhythm; no murmur, rub, or gallop  Abdomen:   Soft, + mild epigastric tenderness, non-distended; normal bowel sounds; no masses, no organomegaly    Genitalia:   Deferred    Rectal:   Deferred    Extremities:  No cyanosis, clubbing or edema    Pulses:  2+ and symmetric    Skin:  No jaundice, rashes, or lesions    Lymph nodes:  No palpable cervical lymphadenopathy        Lab Results:   Lab Results   Component Value Date    WBC 6 67 09/18/2017    HGB 13 6 09/18/2017    HCT 41 2 09/18/2017    MCV 97 09/18/2017     09/18/2017     Lab Results   Component Value Date     09/18/2017    K 3 7 09/18/2017     09/18/2017    CO2 31 09/18/2017    ANIONGAP 5 09/18/2017    BUN 11 09/18/2017    CREATININE 0 56 (L) 09/18/2017    GLUCOSE 90 09/18/2017    CALCIUM 9 4 09/18/2017    AST 23 09/18/2017    ALT 26 09/18/2017    ALKPHOS 47 09/18/2017    PROT 7 7 09/18/2017    BILITOT 0 19 (L) 09/18/2017    EGFR 95 09/18/2017         Radiology Results:   No results found

## 2018-04-06 ENCOUNTER — OFFICE VISIT (OUTPATIENT)
Dept: UROLOGY | Facility: MEDICAL CENTER | Age: 73
End: 2018-04-06
Payer: MEDICARE

## 2018-04-06 VITALS
BODY MASS INDEX: 23.22 KG/M2 | WEIGHT: 123 LBS | SYSTOLIC BLOOD PRESSURE: 100 MMHG | HEIGHT: 61 IN | DIASTOLIC BLOOD PRESSURE: 50 MMHG

## 2018-04-06 DIAGNOSIS — R35.0 URINARY FREQUENCY: Primary | ICD-10-CM

## 2018-04-06 DIAGNOSIS — N20.0 RENAL CALCULUS, RIGHT: ICD-10-CM

## 2018-04-06 LAB
SL AMB  POCT GLUCOSE, UA: NEGATIVE
SL AMB LEUKOCYTE ESTERASE,UA: ABNORMAL
SL AMB POCT BILIRUBIN,UA: NEGATIVE
SL AMB POCT BLOOD,UA: NEGATIVE
SL AMB POCT CLARITY,UA: CLEAR
SL AMB POCT COLOR,UA: YELLOW
SL AMB POCT KETONES,UA: ABNORMAL
SL AMB POCT NITRITE,UA: NEGATIVE
SL AMB POCT PH,UA: 6
SL AMB POCT SPECIFIC GRAVITY,UA: 1.02
SL AMB POCT URINE PROTEIN: NEGATIVE
SL AMB POCT UROBILINOGEN: 0.2

## 2018-04-06 PROCEDURE — 99214 OFFICE O/P EST MOD 30 MIN: CPT | Performed by: UROLOGY

## 2018-04-06 PROCEDURE — 81003 URINALYSIS AUTO W/O SCOPE: CPT | Performed by: UROLOGY

## 2018-04-06 NOTE — PROGRESS NOTES
[address] [facility]  [facility]  [address] [facility] [address]  [place], [address] [place] [address]  [phone]  www  [facility]  org      NAME: Froy Lee  AGE: 81 y o  SEX: female  : 1945   MRN: [id number]    DATE: 2018  TIME: 10:54 AM    Assessment and Plan:  7 mm right renal calculus:  She wishes to treat this preemptively  Will check a Renal ultrasound and a KUB and have her follow up with that to do history and physical and get a better idea of what we are dealing with in terms of her actual present stone burden  Interstitial cystitis:  She is actually receiving a beneficial effect from the Bentyl  It is still bothersome but it appears to be manageable right now  Urinary frequency:  Has the InterStim unit, but the Bentyl is actually doing a great job  Chief Complaint     Chief Complaint   Patient presents with    Urinary Frequency     3 mo check       History of Present Illness     Urinary frequency:  she had repositioning of interstim generator    Her surgery was done 2017  Generator was repositioned in a northwest direction due to pressure on sciatic nerve- the sciatic nerve pain is now resolved  The urinary frequency was bad until she took Bentyl  When she feels pulsations, she feels in her vulva  Interstitial cystitis-has been having episodes of severe bladder pain, with urgency/frequency, and some dribbling and the Bentyl has been helping this  She tolerates the Bentyl well and this is helping her a great deal in both GI and  systems  Jesus Manuel Torrez also has having pain in her right flank which radiates down to the vulva, and she has a known 7 mm stone in her left kidney, but this has not been bothering her lately    She stopped the [place] after her October cystoscopy  small blood on dip x 2  Had negative rigid cystoscopy in the operating room 10/17/2017        The following portions of the patient's history were reviewed and updated as appropriate: allergies, current medications, past family history, past medical history, past social history, past surgical history and problem list     Review of Systems   Review of Systems    Active Problem List     Patient Active Problem List   Diagnosis    Fecal incontinence    IC (interstitial cystitis)    Increased frequency of urination    Urinary urgency    Difficult or painful urination       Objective   /50 (BP Location: Right arm, Patient Position: Sitting)   Ht 5' 1" (1 549 m)   Wt 55 8 kg (123 lb)   BMI 23 24 kg/m²     Physical Exam   Constitutional: She is oriented to person, place, and time  She appears well-developed and well-nourished  HENT:   Head: Normocephalic and atraumatic  Eyes: EOM are normal    Neck: Normal range of motion  Pulmonary/Chest: Effort normal    Musculoskeletal: Normal range of motion  Neurological: She is alert and oriented to person, place, and time  Skin: Skin is warm and dry  Psychiatric: She has a normal mood and affect   Her behavior is normal  Judgment and thought content normal            Current Medications     Current Outpatient Prescriptions:     acetaminophen (TYLENOL) 500 mg tablet, Take 500-1,000 mg by mouth every 6 (six) hours as needed for mild pain, Disp: , Rfl:     Calcium Carbonate (CALCIUM 600 PO), Take 1 tablet by mouth 2 (two) times a day, Disp: , Rfl:     clonazePAM (KlonoPIN) 0 5 mg tablet, Take 0 5 mg by mouth as needed  , Disp: , Rfl:     dicyclomine (BENTYL) 10 mg capsule, Take 1 capsule (10 mg total) by mouth 4 (four) times a day (before meals and at bedtime) for 30 days, Disp: 120 capsule, Rfl: 3    levothyroxine 75 mcg tablet, Take 75 mcg by mouth daily in the early morning, Disp: , Rfl:     phenazopyridine (PYRIDIUM) 95 MG tablet, Take 95 mg by mouth 3 (three) times a day as needed for bladder spasms, Disp: , Rfl:     polyethylene glycol (MIRALAX) 17 g packet, Take 17 g by mouth daily, Disp: 14 each, Rfl: 0   guaiFENesin (MUCINEX) 600 mg 12 hr tablet, Take 600 mg by mouth, Disp: , Rfl:         Harjeet Burns MD

## 2018-04-06 NOTE — LETTER
2018     Pamela Daniels [address] [employer]    Patient: Ezra Mares   YOB: 1945   Date of Visit: 2018       Dear Dr Kathy Holbrook:    Thank you for referring Gerhardt Staples to me for evaluation  Below are my notes for this consultation  If you have questions, please do not hesitate to call me  I look forward to following your patient along with you  Sincerely,        Gopi Molina MD        CC: No Recipients  Gopi Molina MD  2018 11:10 AM  Sign at close encounter  [address] [facility]  [address], [address] [place] [phone]  www  [facility]  org      NAME: Nery Lee  AGE: 81 y o  SEX: female  : 1945   MRN: [id number]    DATE: 2018  TIME: 10:54 AM    Assessment and Plan:  7 mm right renal calculus:  She wishes to treat this preemptively  Will check a Renal ultrasound and a KUB and have her follow up with that to do history and physical and get a better idea of what we are dealing with in terms of her actual present stone burden  Interstitial cystitis:  She is actually receiving a beneficial effect from the Bentyl  It is still bothersome but it appears to be manageable right now  Urinary frequency:  Has the InterStim unit, but the Bentyl is actually doing a great job  Chief Complaint     Chief Complaint   Patient presents with    Urinary Frequency     3 mo check       History of Present Illness     Urinary frequency:  she had repositioning of interstim generator    Her surgery was done 2017  Generator was repositioned in a northwest direction due to pressure on sciatic nerve- the sciatic nerve pain is now resolved  The urinary frequency was bad until she took Bentyl  When she feels pulsations, she feels in her vulva     Interstitial cystitis-has been having episodes of severe bladder pain, with urgency/frequency, and some dribbling and the Bentyl has been helping this  She tolerates the Bentyl well and this is helping her a great deal in both GI and  systems  Kary Oshea also has having pain in her right flank which radiates down to the vulva, and she has a known 7 mm stone in her left kidney, but this has not been bothering her lately    She stopped the Elmiron after her October cystoscopy  small blood on dip x 2  Had negative rigid cystoscopy in the operating room 10/17/2017  The following portions of the patient's history were reviewed and updated as appropriate: allergies, current medications, past family history, past medical history, past social history, past surgical history and problem list     Review of Systems   Review of Systems    Active Problem List     Patient Active Problem List   Diagnosis    Fecal incontinence    IC (interstitial cystitis)    Increased frequency of urination    Urinary urgency    Difficult or painful urination       Objective   /50 (BP Location: Right arm, Patient Position: Sitting)   Ht 5' 1" (1 549 m)   Wt 55 8 kg (123 lb)   BMI 23 24 kg/m²      Physical Exam   Constitutional: She is oriented to person, place, and time  She appears well-developed and well-nourished  HENT:   Head: Normocephalic and atraumatic  Eyes: EOM are normal    Neck: Normal range of motion  Pulmonary/Chest: Effort normal    Musculoskeletal: Normal range of motion  Neurological: She is alert and oriented to person, place, and time  Skin: Skin is warm and dry  Psychiatric: She has a normal mood and affect   Her behavior is normal  Judgment and thought content normal            Current Medications     Current Outpatient Prescriptions:     acetaminophen (TYLENOL) 500 mg tablet, Take 500-1,000 mg by mouth every 6 (six) hours as needed for mild pain, Disp: , Rfl:     Calcium Carbonate (CALCIUM 600 PO), Take 1 tablet by mouth 2 (two) times a day, Disp: , Rfl:     clonazePAM (KlonoPIN) 0 5 mg tablet, Take 0 5 mg by mouth as needed  , Disp: , Rfl:     dicyclomine (BENTYL) 10 mg capsule, Take 1 capsule (10 mg total) by mouth 4 (four) times a day (before meals and at bedtime) for 30 days, Disp: 120 capsule, Rfl: 3    levothyroxine 75 mcg tablet, Take 75 mcg by mouth daily in the early morning, Disp: , Rfl:     phenazopyridine (PYRIDIUM) 95 MG tablet, Take 95 mg by mouth 3 (three) times a day as needed for bladder spasms, Disp: , Rfl:     polyethylene glycol (MIRALAX) 17 g packet, Take 17 g by mouth daily, Disp: 14 each, Rfl: 0    guaiFENesin (MUCINEX) 600 mg 12 hr tablet, Take 600 mg by mouth, Disp: , Rfl:         Xander Weiner MD

## 2018-04-09 ENCOUNTER — TELEPHONE (OUTPATIENT)
Dept: UROLOGY | Facility: AMBULATORY SURGERY CENTER | Age: 73
End: 2018-04-09

## 2018-04-09 NOTE — TELEPHONE ENCOUNTER
Spoke with pt, gave her phone number of [employer] rep  She will call and schedule a time to meet here to test unit

## 2018-04-09 NOTE — TELEPHONE ENCOUNTER
PT CALLED AND STATED THAT SHE THINKS HER INTERSTIM AS  AND WAS WONDERING IF SHE SHOULD HAVE AN APPOINTMENT WITH DR COLEMAN  PLEASE ADVISE  [phone]

## 2018-04-11 ENCOUNTER — HOSPITAL ENCOUNTER (OUTPATIENT)
Dept: RADIOLOGY | Facility: HOSPITAL | Age: 73
Discharge: HOME | End: 2018-04-11
Attending: UROLOGY
Payer: MEDICARE

## 2018-04-11 ENCOUNTER — HOSPITAL ENCOUNTER (OUTPATIENT)
Dept: ULTRASOUND IMAGING | Facility: HOSPITAL | Age: 73
Discharge: HOME | End: 2018-04-11
Attending: UROLOGY
Payer: MEDICARE

## 2018-04-11 DIAGNOSIS — N20.0 RENAL CALCULUS, RIGHT: ICD-10-CM

## 2018-04-11 PROCEDURE — 74018 RADEX ABDOMEN 1 VIEW: CPT

## 2018-04-11 PROCEDURE — 76770 US EXAM ABDO BACK WALL COMP: CPT

## 2018-04-13 NOTE — TELEPHONE ENCOUNTER
Patient called back, she said the [employer] rep never called her and told her what to do  She would like a call back from the nurse   Please advise

## 2018-04-13 NOTE — TELEPHONE ENCOUNTER
Spoke with patient she said she is leaking all the time, voiding more often and she said she tried to talk to the lady at [employer] and she gave her the run around  She has changed the batteries for the Revees device and it still doesn't work She has an appointment with Dr Shanika hSultz on 5/4/18, no other openings  I told her I would talk with Dr Shanika Shultz if theirs anything else she can do

## 2018-04-14 ENCOUNTER — HOSPITAL ENCOUNTER (OUTPATIENT)
Dept: RADIOLOGY | Facility: HOSPITAL | Age: 73
Discharge: HOME | End: 2018-04-14
Attending: INTERNAL MEDICINE
Payer: MEDICARE

## 2018-04-14 DIAGNOSIS — K21.9 GASTROESOPHAGEAL REFLUX DISEASE, ESOPHAGITIS PRESENCE NOT SPECIFIED: ICD-10-CM

## 2018-04-14 DIAGNOSIS — R11.2 NAUSEA AND VOMITING, INTRACTABILITY OF VOMITING NOT SPECIFIED, UNSPECIFIED VOMITING TYPE: ICD-10-CM

## 2018-04-14 PROCEDURE — 78264 GASTRIC EMPTYING IMG STUDY: CPT

## 2018-04-14 PROCEDURE — A9541 TC99M SULFUR COLLOID: HCPCS

## 2018-04-20 ENCOUNTER — OFFICE VISIT (OUTPATIENT)
Dept: GASTROENTEROLOGY | Facility: MEDICAL CENTER | Age: 73
End: 2018-04-20
Payer: MEDICARE

## 2018-04-20 VITALS
HEIGHT: 61 IN | TEMPERATURE: 97.8 F | BODY MASS INDEX: 23.98 KG/M2 | DIASTOLIC BLOOD PRESSURE: 60 MMHG | WEIGHT: 127 LBS | HEART RATE: 82 BPM | SYSTOLIC BLOOD PRESSURE: 118 MMHG

## 2018-04-20 DIAGNOSIS — R10.84 GENERALIZED ABDOMINAL PAIN: ICD-10-CM

## 2018-04-20 DIAGNOSIS — K59.01 SLOW TRANSIT CONSTIPATION: ICD-10-CM

## 2018-04-20 DIAGNOSIS — K21.9 GASTROESOPHAGEAL REFLUX DISEASE WITHOUT ESOPHAGITIS: ICD-10-CM

## 2018-04-20 DIAGNOSIS — R13.19 ESOPHAGEAL DYSPHAGIA: Primary | ICD-10-CM

## 2018-04-20 DIAGNOSIS — R11.2 NAUSEA AND VOMITING, INTRACTABILITY OF VOMITING NOT SPECIFIED, UNSPECIFIED VOMITING TYPE: ICD-10-CM

## 2018-04-20 DIAGNOSIS — R14.0 ABDOMINAL BLOATING: ICD-10-CM

## 2018-04-20 PROCEDURE — 99214 OFFICE O/P EST MOD 30 MIN: CPT | Performed by: INTERNAL MEDICINE

## 2018-04-20 RX ORDER — POLYETHYLENE GLYCOL 3350 17 G/17G
17 POWDER, FOR SOLUTION ORAL DAILY
Qty: 30 EACH | Refills: 3 | Status: SHIPPED | OUTPATIENT
Start: 2018-04-20 | End: 2019-07-18 | Stop reason: ALTCHOICE

## 2018-04-20 RX ORDER — DICYCLOMINE HYDROCHLORIDE 10 MG/1
10 CAPSULE ORAL
Qty: 120 CAPSULE | Refills: 3 | Status: SHIPPED | OUTPATIENT
Start: 2018-04-20 | End: 2019-09-11

## 2018-04-20 RX ORDER — ONDANSETRON 4 MG/1
4 TABLET, FILM COATED ORAL EVERY 8 HOURS PRN
Qty: 30 TABLET | Refills: 3 | Status: SHIPPED | OUTPATIENT
Start: 2018-04-20 | End: 2018-06-08

## 2018-04-20 NOTE — PROGRESS NOTES
[facility] - Outpatient Consultation  Miquel Holder 81 y o  female MRN: [id number]  Encounter: [id number]      PCP: Keisha Fletcher DO  Referring: No referring provider defined for this encounter  ASSESSMENT AND PLAN:      1  Esophageal dysphagia  2  Gastroesophageal reflux disease without esophagitis  Differential includes peptic stricture vs Schatzki's ring vs Zenker's vs malignancy (less likely)  Will plan for EGD with possible dilation and to evaluate long standing reflux   - Case request operating room: ESOPHAGOGASTRODUODENOSCOPY (EGD); Standing  - Case request operating room: ESOPHAGOGASTRODUODENOSCOPY (EGD)    3  Generalized abdominal pain  4  Abdominal bloating  5  Slow transit constipation  6  Nausea and vomiting, intractability of vomiting not specified, unspecified vomiting type  Symptoms improved with treatment for presumed IBS-C vs slow transit constipation  Continue bentyl for spasms as needed  She is encouraged to titrate up on her miralax - currently taking 1 capful daily, may increase to 1 5-2 capfuls daily  I encouraged her to continue pelvic rehab, as she is planning through pelvic pilates and Dr Quin Smith  She is also exploring the option of acupuncture, which I also feel will benefit her  Zofran as needed for antiemetic    - dicyclomine (BENTYL) 10 mg capsule; Take 1 capsule (10 mg total) by mouth 4 (four) times a day (before meals and at bedtime) for 30 days  Dispense: 120 capsule; Refill: 3  - polyethylene glycol (MIRALAX) 17 g packet; Take 17 g by mouth daily  Dispense: 30 each; Refill: 3  - ondansetron (ZOFRAN) 4 mg tablet; Take 1 tablet (4 mg total) by mouth every 8 (eight) hours as needed for nausea or vomiting  Dispense: 30 tablet; Refill: 3      ______________________________________________________________________    HPI:      Patient is a 78year old female who sees me to day in follow up of her constipation, abdominal bloating, and reflux    Her last visit I recommended MiraLax, which she now takes one cap full daily at in the evening  She relates more relieving bowel movements and improvement in her abdominal bloating  Yesterday, specifically she relates having one relieving bowel movement  She continues to experience symptoms of incomplete evacuation at times however  She feels like pelvic floor rehabilitation has helped her with Dr Davin Root  She will follow with pelvic floor Pilates in [place] shortly to continue her pelvic floor work  She also takes half a capsule (5 mg) of Bentyl twice daily which is improved her abdominal spasms  She feels like Bentyl has also improved her urinary frequency and urgency in the evenings and overnight  She also complains today of dysphagia  Specifically she feels pills get stuck in her upper esophagus  She drinks large amounts of water to help it pass  She denies unintentional weight loss, hematemesis  Her last colonoscopy was ~ 10 years ago, but she prefers to defer that evaluation at this time  REVIEW OF SYSTEMS:    CONSTITUTIONAL: Denies any fever, chills, rigors, and weight loss  HEENT: No earache or tinnitus  Denies hearing loss or visual disturbances  CARDIOVASCULAR: No chest pain or palpitations  RESPIRATORY: Denies any cough, hemoptysis, shortness of breath or dyspnea on exertion  GASTROINTESTINAL: As noted in the History of Present Illness  GENITOURINARY: No problems with urination  Denies any hematuria or dysuria  NEUROLOGIC: No dizziness or vertigo, denies headaches  MUSCULOSKELETAL: Denies any muscle or joint pain  SKIN: Denies skin rashes or itching  ENDOCRINE: Denies excessive thirst  Denies intolerance to heat or cold  PSYCHOSOCIAL: Denies depression or anxiety  Denies any recent memory loss         Historical Information   Past Medical History:   Diagnosis Date    Allergy to adhesive tape     tears skin    Arthritis     Back pain     Cataract     Fecal smearing     Fibromyalgia, primary     Frequent urination     Hypercholesteremia     Hypothyroid     Interstitial cystitis     has interStim    Irritable bowel syndrome     Kidney stone     Microscopic hematuria     Muscle spasm     Myofascial pain syndrome     Osteoporosis     Other allergy, initial encounter     allergy to blue dye    Pelvic pain     PONV (postoperative nausea and vomiting)     and pruritis    Rosacea     Skin cancer     Spondylisthesis     Urinary urgency     Wears dentures     lower partial    Wears glasses      Past Surgical History:   Procedure Laterality Date    APPENDECTOMY      CATARACT EXTRACTION Bilateral     COLONOSCOPY      HYSTERECTOMY  1971    OTHER SURGICAL HISTORY Left     limb lengthening    OTHER SURGICAL HISTORY Bilateral 2012, 2014, 2017    interStim    OTHER SURGICAL HISTORY      tendon contracture right hip    [place] CYSTOURETHROSCOPY N/A 10/4/2017    Procedure: CYSTOSCOPY;  Surgeon: Nona Martinez MD;  Location: [facility];  Service: Urology    SALPINGOOPHORECTOMY  2002    TONSILLECTOMY       Social History   History   Alcohol Use    Yes     Comment: few x year     History   Drug Use No     History   Smoking Status    Never Smoker   Smokeless Tobacco    Never Used     Family History   Problem Relation Age of Onset    Cancer Father      Oral cancer       Meds/Allergies       Current Outpatient Prescriptions:     acetaminophen (TYLENOL) 500 mg tablet    Calcium Carbonate (CALCIUM 600 PO)    clonazePAM (KlonoPIN) 0 5 mg tablet    dicyclomine (BENTYL) 10 mg capsule    guaiFENesin (MUCINEX) 600 mg 12 hr tablet    levothyroxine 75 mcg tablet    phenazopyridine (PYRIDIUM) 95 MG tablet    polyethylene glycol (MIRALAX) 17 g packet    ondansetron (ZOFRAN) 4 mg tablet    Allergies   Allergen Reactions    Sulfa Antibiotics Rash    Blue Dyes (Parenteral) Hives    Macrobid [Nitrofurantoin]     Other      Adhesive tape causes blisters    Sulfate Objective     Blood pressure 118/60, pulse 82, temperature 97 8 °F (36 6 °C), temperature source Tympanic, height 5' 1" (1 549 m), weight 57 6 kg (127 lb), not currently breastfeeding  Body mass index is 24 kg/m²  PHYSICAL EXAM:      General Appearance:   Alert, cooperative, no distress   HEENT:   Normocephalic, atraumatic, anicteric      Neck:  Supple, symmetrical, trachea midline   Lungs:   Clear to auscultation bilaterally; no rales, rhonchi or wheezing; respirations unlabored    Heart[de-identified]   Regular rate and rhythm; no murmur, rub, or gallop  Abdomen:   Soft, non-tender, non-distended; normal bowel sounds; no masses, no organomegaly    Genitalia:   Deferred    Rectal:   Deferred    Extremities:  No cyanosis, clubbing or edema    Pulses:  2+ and symmetric    Skin:  No jaundice, rashes, or lesions    Lymph nodes:  No palpable cervical lymphadenopathy        Lab Results:     Lab Results   Component Value Date    WBC 6 67 09/18/2017    HGB 13 6 09/18/2017    HCT 41 2 09/18/2017    MCV 97 09/18/2017     09/18/2017       Lab Results   Component Value Date     09/18/2017    K 3 7 09/18/2017     09/18/2017    CO2 31 09/18/2017    ANIONGAP 5 09/18/2017    BUN 11 09/18/2017    CREATININE 0 56 (L) 09/18/2017    GLUCOSE 90 09/18/2017    CALCIUM 9 4 09/18/2017    AST 23 09/18/2017    ALT 26 09/18/2017    ALKPHOS 47 09/18/2017    PROT 7 7 09/18/2017    BILITOT 0 19 (L) 09/18/2017    EGFR 95 09/18/2017       No results found for: INR, PROTIME      Radiology Results:   Xr Abdomen 1 View Kub    Result Date: 4/13/2018  Narrative: ABDOMEN INDICATION:   N20 0: Calculus of kidney  COMPARISON:  January 10, 2018 VIEWS:  AP supine FINDINGS: No radiopaque renal calculi seen  No radiopaque ureteral calculi identified  Nonobstructive bowel gas pattern  No acute osseous abnormality is seen  Stimulator pack is visualized overlying the left iliac bone  Impression: No radiopaque urinary tract calculi  Workstation performed: [id number]     Nm Gastric Emptying    Result Date: 4/16/2018  Narrative: GASTRIC EMPTYING STUDY INDICATION:  Gastroparesis  R11 2: Nausea with vomiting, unspecified K21 9: Gastro-esophageal reflux disease without esophagitis COMPARISON:  None available TECHNIQUE:   The study was performed following the oral administration of 1 1 mCi Tc-99m sulfur colloid combined with scrambled eggs, as part of a standard meal   Following the meal, one minute anterior and posterior images were obtained immediately and at 0 25 hours, 0 5 hour, 1 hour, 1 5 hour, 2 hour, 3 hour and 4 hour intervals from the time of ingestion  Geometric mean analyses were then performed  As of March 1, 2016, this gastric emptying protocol has been modified and updated  The gastric emptying times and the normal values reported below reflect the change in protocol  FINDINGS: Gastric emptying at 0 5 hours = 16 (N < 30%) Gastric emptying at 1 hour = 29 % (N = 10 - 70%) Gastric emptying at 2 hours = 63 % (N = > 40%) Gastric emptying at 3 hours = 95 % (N = > 70%) Linear T-1/2 = 98 minutes     Impression: Normal rate of gastric emptying  Workstation performed: [id number]      Kidney And Bladder    Result Date: 4/13/2018  Narrative: RENAL ULTRASOUND INDICATION:   N20 0: Calculus of kidney  COMPARISON: None TECHNIQUE:   Ultrasound of the retroperitoneum was performed with a curvilinear transducer utilizing volumetric sweeps and still imaging techniques  FINDINGS: KIDNEYS: Symmetric and normal size  Right kidney:  10 x 4 5 cm  Left kidney:  11 1 x 5 8 cm  Right kidney Normal echogenicity and contour  No suspicious masses detected  No hydronephrosis  No shadowing calculi  No perinephric fluid collections  Left kidney Normal echogenicity and contour  No suspicious masses detected  Left lower pole renal cysts measuring 1 2 x 0 8 cm and 0 7 x 0 6 cm adjacent to one another  Both cysts appear simple  No hydronephrosis   Left mid pole renal 4 m nonobstructive calculus  No perinephric fluid collections  URETERS: Nonvisualized  BLADDER: Normally distended  No focal thickening or mass lesions  Bilateral ureteral jets detected  Impression: 1  Left mid pole renal 4 m nonobstructive calculus  2   Left renal cysts   Workstation performed: [id number]

## 2018-04-20 NOTE — LETTER
2018     Pamela Daniels [address]    Patient: Mis Jacob   YOB: 1945   Date of Visit: 2018       Dear Dr Root :    Thank you for referring Jessica Stevens to me for evaluation  Below are my notes for this consultation  If you have questions, please do not hesitate to call me  I look forward to following your patient along with you  Sincerely,      JAYANT Baumann  [facility]  Mobile: [phone]  Available on Blued  [email or web address]  [email or web address]           CC: No Recipients  Majo Sullivan MD  2018  8:48 AM  Sign at close encounter  [address] [facility] - Outpatient Consultation  Mis Jacob 81 y o  female MRN: [id number]  Encounter: [id number]      PCP: Isabel Serrano DO  Referring: No referring provider defined for this encounter  ASSESSMENT AND PLAN:      1  Esophageal dysphagia  2  Gastroesophageal reflux disease without esophagitis  Differential includes peptic stricture vs Schatzki's ring vs Zenker's vs malignancy (less likely)  Will plan for EGD with possible dilation and to evaluate long standing reflux   - Case request operating room: ESOPHAGOGASTRODUODENOSCOPY (EGD); Standing  - Case request operating room: ESOPHAGOGASTRODUODENOSCOPY (EGD)    3  Generalized abdominal pain  4  Abdominal bloating  5  Slow transit constipation  6  Nausea and vomiting, intractability of vomiting not specified, unspecified vomiting type  Symptoms improved with treatment for presumed IBS-C vs slow transit constipation  Continue bentyl for spasms as needed  She is encouraged to titrate up on her miralax - currently taking 1 capful daily, may increase to 1 5-2 capfuls daily  I encouraged her to continue pelvic rehab, as she is planning through pelvic pildarrin and Dr Mejia Worthington  She is also exploring the option of acupuncture, which I also feel will benefit her   Zofran as needed for antiemetic    - dicyclomine (BENTYL) 10 mg capsule; Take 1 capsule (10 mg total) by mouth 4 (four) times a day (before meals and at bedtime) for 30 days  Dispense: 120 capsule; Refill: 3  - polyethylene glycol (MIRALAX) 17 g packet; Take 17 g by mouth daily  Dispense: 30 each; Refill: 3  - ondansetron (ZOFRAN) 4 mg tablet; Take 1 tablet (4 mg total) by mouth every 8 (eight) hours as needed for nausea or vomiting  Dispense: 30 tablet; Refill: 3      ______________________________________________________________________    HPI:      Patient is a 78year old female who sees me to day in follow up of her constipation, abdominal bloating, and reflux  Her last visit I recommended MiraLax, which she now takes one cap full daily at in the evening  She relates more relieving bowel movements and improvement in her abdominal bloating  Yesterday, specifically she relates having one relieving bowel movement  She continues to experience symptoms of incomplete evacuation at times however  She feels like pelvic floor rehabilitation has helped her with Dr Ferrell Severance  [place] [facility] [place] work  She also takes half a capsule (5 mg) of Bentyl twice daily which is improved her abdominal spasms  She feels like Bentyl has also improved her urinary frequency and urgency in the evenings and overnight  She also complains today of dysphagia  Specifically she feels pills get stuck in her upper esophagus  She drinks large amounts of water to help it pass  She denies unintentional weight loss, hematemesis  Her last colonoscopy was ~ 10 years ago, but she prefers to defer that evaluation at this time  REVIEW OF SYSTEMS:    CONSTITUTIONAL: Denies any fever, chills, rigors, and weight loss  HEENT: No earache or tinnitus  Denies hearing loss or visual disturbances  CARDIOVASCULAR: No chest pain or palpitations     RESPIRATORY: Denies any cough, hemoptysis, shortness of breath or dyspnea on exertion  GASTROINTESTINAL: As noted in the History of Present Illness  GENITOURINARY: No problems with urination  Denies any hematuria or dysuria  NEUROLOGIC: No dizziness or vertigo, denies headaches  MUSCULOSKELETAL: Denies any muscle or joint pain  SKIN: Denies skin rashes or itching  ENDOCRINE: Denies excessive thirst  Denies intolerance to heat or cold  PSYCHOSOCIAL: Denies depression or anxiety  Denies any recent memory loss         Historical Information   Past Medical History:   Diagnosis Date    Allergy to adhesive tape     tears skin    Arthritis     Back pain     Cataract     Fecal smearing     Fibromyalgia, primary     Frequent urination     Hypercholesteremia     Hypothyroid     Interstitial cystitis     has interStim    Irritable bowel syndrome     Kidney stone     Microscopic hematuria     Muscle spasm     Myofascial pain syndrome     Osteoporosis     Other allergy, initial encounter     allergy to blue dye    Pelvic pain     PONV (postoperative nausea and vomiting)     and pruritis    Rosacea     Skin cancer     Spondylisthesis     Urinary urgency     Wears dentures     lower partial    Wears glasses      Past Surgical History:   Procedure Laterality Date    APPENDECTOMY      CATARACT EXTRACTION Bilateral     COLONOSCOPY      HYSTERECTOMY  1971    OTHER SURGICAL HISTORY Left     limb lengthening    OTHER SURGICAL HISTORY Bilateral 2012, 2014, 2017    interStim    OTHER SURGICAL HISTORY      tendon contracture right hip    [facility] CYSTOURETHROSCOPY N/A 10/4/2017    Procedure: Russell Suazo;  Surgeon: Malika Moya MD;  [facility]: [facility];  Service: Urology    SALPINGOOPHORECTOMY  2002    TONSILLECTOMY       Social History   History   Alcohol Use    Yes     Comment: few x year     History   Drug Use No     History   Smoking Status    Never Smoker   Smokeless Tobacco    Never Used     Family History   Problem Relation Age of Onset    Cancer Father      Oral cancer       Meds/Allergies       Current Outpatient Prescriptions:     acetaminophen (TYLENOL) 500 mg tablet    Calcium Carbonate (CALCIUM 600 PO)    clonazePAM (KlonoPIN) 0 5 mg tablet    dicyclomine (BENTYL) 10 mg capsule    guaiFENesin (MUCINEX) 600 mg 12 hr tablet    levothyroxine 75 mcg tablet    phenazopyridine (PYRIDIUM) 95 MG tablet    polyethylene glycol (MIRALAX) 17 g packet    ondansetron (ZOFRAN) 4 mg tablet    Allergies   Allergen Reactions    Sulfa Antibiotics Rash    Blue Dyes (Parenteral) Hives    Macrobid [Nitrofurantoin]     Other      Adhesive tape causes blisters    Sulfate            Objective     Blood pressure 118/60, pulse 82, temperature 97 8 °F (36 6 °C), temperature source Tympanic, height 5' 1" (1 549 m), weight 57 6 kg (127 lb), not currently breastfeeding  Body mass index is 24 kg/m²  PHYSICAL EXAM:      General Appearance:   Alert, cooperative, no distress   HEENT:   Normocephalic, atraumatic, anicteric      Neck:  Supple, symmetrical, trachea midline   Lungs:   Clear to auscultation bilaterally; no rales, rhonchi or wheezing; respirations unlabored    Heart[de-identified]   Regular rate and rhythm; no murmur, rub, or gallop     Abdomen:   Soft, non-tender, non-distended; normal bowel sounds; no masses, no organomegaly    Genitalia:   Deferred    Rectal:   Deferred    Extremities:  No cyanosis, clubbing or edema    Pulses:  2+ and symmetric    Skin:  No jaundice, rashes, or lesions    Lymph nodes:  No palpable cervical lymphadenopathy        Lab Results:     Lab Results   Component Value Date    WBC 6 67 09/18/2017    HGB 13 6 09/18/2017    HCT 41 2 09/18/2017    MCV 97 09/18/2017     09/18/2017       Lab Results   Component Value Date     09/18/2017    K 3 7 09/18/2017     09/18/2017    CO2 31 09/18/2017    ANIONGAP 5 09/18/2017    BUN 11 09/18/2017    CREATININE 0 56 (L) 09/18/2017    GLUCOSE 90 09/18/2017    CALCIUM 9 4 09/18/2017    AST 23 09/18/2017    ALT 26 09/18/2017    ALKPHOS 47 09/18/2017    PROT 7 7 09/18/2017    BILITOT 0 19 (L) 09/18/2017    EGFR 95 09/18/2017       No results found for: INR, PROTIME      Radiology Results:   Xr Abdomen 1 View Kub    Result Date: 4/13/2018  Narrative: ABDOMEN INDICATION:   N20 0: Calculus of kidney  COMPARISON:  January 10, 2018 VIEWS:  AP supine FINDINGS: No radiopaque renal calculi seen  No radiopaque ureteral calculi identified  Nonobstructive bowel gas pattern  No acute osseous abnormality is seen  Stimulator pack is visualized overlying the left iliac bone  Impression: No radiopaque urinary tract calculi  Workstation performed: [id number]     Nm Gastric Emptying    Result Date: 4/16/2018  Narrative: GASTRIC EMPTYING STUDY INDICATION:  Gastroparesis  R11 2: Nausea with vomiting, unspecified K21 9: Gastro-esophageal reflux disease without esophagitis COMPARISON:  None available TECHNIQUE:   The study was performed following the oral administration of 1 1 mCi Tc-99m sulfur colloid combined with scrambled eggs, as part of a standard meal   Following the meal, one minute anterior and posterior images were obtained immediately and at 0 25 hours, 0 5 hour, 1 hour, 1 5 hour, 2 hour, 3 hour and 4 hour intervals from the time of ingestion  Geometric mean analyses were then performed  As of March 1, 2016, this gastric emptying protocol has been modified and updated  The gastric emptying times and the normal values reported below reflect the change in protocol  FINDINGS: Gastric emptying at 0 5 hours = 16 (N < 30%) Gastric emptying at 1 hour = 29 % (N = 10 - 70%) Gastric emptying at 2 hours = 63 % (N = > 40%) Gastric emptying at 3 hours = 95 % (N = > 70%) Linear T-1/2 = 98 minutes     Impression: Normal rate of gastric emptying  Workstation performed: [id number]     Us Kidney And Bladder    Result Date: 4/13/2018  Narrative: RENAL ULTRASOUND INDICATION:   N20 0: Calculus of kidney  COMPARISON: None TECHNIQUE:   Ultrasound of the retroperitoneum was performed with a curvilinear transducer utilizing volumetric sweeps and still imaging techniques  FINDINGS: KIDNEYS: Symmetric and normal size  Right kidney:  10 x 4 5 cm  Left kidney:  11 1 x 5 8 cm  Right kidney Normal echogenicity and contour  No suspicious masses detected  No hydronephrosis  No shadowing calculi  No perinephric fluid collections  Left kidney Normal echogenicity and contour  No suspicious masses detected  Left lower pole renal cysts measuring 1 2 x 0 8 cm and 0 7 x 0 6 cm adjacent to one another  Both cysts appear simple  No hydronephrosis  Left mid pole renal 4 m nonobstructive calculus  No perinephric fluid collections  URETERS: Nonvisualized  BLADDER: Normally distended  No focal thickening or mass lesions  Bilateral ureteral jets detected  Impression: 1  Left mid pole renal 4 m nonobstructive calculus  2   Left renal cysts   Workstation performed: [id number]

## 2018-04-20 NOTE — PATIENT INSTRUCTIONS
Pt is scheduled for an Egd at [facility] with Dr Leah Corrigan on 5/25/18  Pt has instructions to egd   Pt has a 6/8/18 follow up with Dr Leah Corrigan

## 2018-05-04 ENCOUNTER — OFFICE VISIT (OUTPATIENT)
Dept: UROLOGY | Facility: MEDICAL CENTER | Age: 73
End: 2018-05-04
Payer: MEDICARE

## 2018-05-04 VITALS — DIASTOLIC BLOOD PRESSURE: 60 MMHG | SYSTOLIC BLOOD PRESSURE: 116 MMHG | HEIGHT: 61 IN

## 2018-05-04 DIAGNOSIS — R35.0 INCREASED FREQUENCY OF URINATION: ICD-10-CM

## 2018-05-04 PROCEDURE — 95971 ALYS SMPL SP/PN NPGT W/PRGRM: CPT | Performed by: UROLOGY

## 2018-05-04 PROCEDURE — 99212 OFFICE O/P EST SF 10 MIN: CPT | Performed by: UROLOGY

## 2018-05-04 NOTE — PROGRESS NOTES
[address] [facility]  [facility]  [address] [facility]  [place], [address] [place] [address]  [phone]  www  [facility]  org      NAME: Checo Lee  AGE: 81 y o  SEX: female  : 1945   MRN: [id number]    DATE: 2018  TIME: 8:28 AM    Assessment and Plan:  URGENCY AND FREQUENCY with interstitial cystitis-reprogrammed InterStim generator, which should help  The lead is in good position and she is feeling pulsations  Bentyl is helping this as well as her bowels  Renal calculus left, no need for treatment  She will call me when the generator stops working or she develops any other symptoms  Chief Complaint   No chief complaint on file  History of Present Illness   Left renal calculus: This measured 1 mm under CT scan in 2018  Renal ultrasound and KUB 2018:  Renal ultrasound shows a 4 mm left renal calculus, KUB shows no radiopaque calculi  No need for treatment  Interstitial cystitis: See below  Urgency and frequency:Bentyl has helped with both, but still has bad days with pain and frequency  She says her Interstim unit has   I interrogated the unit-she was at 7 0 on lead to and I switched it to lead 3-I was able to synchronize, meaning that the unit is still viable and on lead 3 she had frontal pulsations at energy 1 0        The following portions of the patient's history were reviewed and updated as appropriate: allergies, current medications, past family history, past medical history, past social history, past surgical history and problem list     Review of Systems   Review of Systems    Active Problem List     Patient Active Problem List   Diagnosis    Fecal incontinence    IC (interstitial cystitis)    Increased frequency of urination    Urinary urgency    Difficult or painful urination    Esophageal dysphagia    Generalized abdominal pain    Gastroesophageal reflux disease without esophagitis       Objective There were no vitals taken for this visit      Physical Exam        Current Medications     Current Outpatient Prescriptions:     acetaminophen (TYLENOL) 500 mg tablet, Take 500-1,000 mg by mouth every 6 (six) hours as needed for mild pain, Disp: , Rfl:     Calcium Carbonate (CALCIUM 600 PO), Take 1 tablet by mouth 2 (two) times a day, Disp: , Rfl:     clonazePAM (KlonoPIN) 0 5 mg tablet, Take 0 5 mg by mouth as needed  , Disp: , Rfl:     dicyclomine (BENTYL) 10 mg capsule, Take 1 capsule (10 mg total) by mouth 4 (four) times a day (before meals and at bedtime) for 30 days, Disp: 120 capsule, Rfl: 3    guaiFENesin (MUCINEX) 600 mg 12 hr tablet, Take 600 mg by mouth, Disp: , Rfl:     levothyroxine 75 mcg tablet, Take 75 mcg by mouth daily in the early morning, Disp: , Rfl:     ondansetron (ZOFRAN) 4 mg tablet, Take 1 tablet (4 mg total) by mouth every 8 (eight) hours as needed for nausea or vomiting, Disp: 30 tablet, Rfl: 3    phenazopyridine (PYRIDIUM) 95 MG tablet, Take 95 mg by mouth 3 (three) times a day as needed for bladder spasms, Disp: , Rfl:     polyethylene glycol (MIRALAX) 17 g packet, Take 17 g by mouth daily, Disp: 30 each, Rfl: 3        Solo Fuller MD

## 2018-05-04 NOTE — LETTER
May 4, 2018     Lucien Kimball, [address] [facility]  [address] Pr[address] [address]    Patient: Marilee Acosta   YOB: 1945   Date of Visit: 2018       Dear Dr Cezar Fong:    Thank you for referring Rajan Kelsey to me for evaluation  Below are my notes for this consultation  If you have questions, please do not hesitate to call me  I look forward to following your patient along with you  Sincerely,        Chandu Goyal MD        CC: No Recipients  Chandu Goyal MD  2018  8:47 AM  Sign at close encounter  [address] [facility]  [address], [address] [place] [phone]  www  [facility]  org      NAME: Marietta Lee  AGE: 81 y o  SEX: female  : 1945   MRN: [id number]    DATE: 2018  TIME: 8:28 AM    Assessment and Plan:  URGENCY AND FREQUENCY with interstitial cystitis-reprogrammed InterStim generator, which should help  The lead is in good position and she is feeling pulsations  Bentyl is helping this as well as her bowels  Renal calculus left, no need for treatment  She will call me when the generator stops working or she develops any other symptoms  Chief Complaint   No chief complaint on file  History of Present Illness   Left renal calculus: This measured 1 mm under CT scan in 2018  Renal ultrasound and KUB 2018:  Renal ultrasound shows a 4 mm left renal calculus, KUB shows no radiopaque calculi  No need for treatment  Interstitial cystitis: See below  Urgency and frequency:Bentyl has helped with both, but still has bad days with pain and frequency  She says her Interstim unit has   I interrogated the unit-she was at 7 0 on lead to and I switched it to lead 3-I was able to synchronize, meaning that the unit is still viable and on lead 3 she had frontal pulsations at energy 1 0        The following portions of the patient's history were reviewed and updated as appropriate: allergies, current medications, past family history, past medical history, past social history, past surgical history and problem list     Review of Systems   Review of Systems    Active Problem List     Patient Active Problem List   Diagnosis    Fecal incontinence    IC (interstitial cystitis)    Increased frequency of urination    Urinary urgency    Difficult or painful urination    Esophageal dysphagia    Generalized abdominal pain    Gastroesophageal reflux disease without esophagitis       Objective   There were no vitals taken for this visit      Physical Exam        Current Medications     Current Outpatient Prescriptions:     acetaminophen (TYLENOL) 500 mg tablet, Take 500-1,000 mg by mouth every 6 (six) hours as needed for mild pain, Disp: , Rfl:     Calcium Carbonate (CALCIUM 600 PO), Take 1 tablet by mouth 2 (two) times a day, Disp: , Rfl:     clonazePAM (KlonoPIN) 0 5 mg tablet, Take 0 5 mg by mouth as needed  , Disp: , Rfl:     dicyclomine (BENTYL) 10 mg capsule, Take 1 capsule (10 mg total) by mouth 4 (four) times a day (before meals and at bedtime) for 30 days, Disp: 120 capsule, Rfl: 3    guaiFENesin (MUCINEX) 600 mg 12 hr tablet, Take 600 mg by mouth, Disp: , Rfl:     levothyroxine 75 mcg tablet, Take 75 mcg by mouth daily in the early morning, Disp: , Rfl:     ondansetron (ZOFRAN) 4 mg tablet, Take 1 tablet (4 mg total) by mouth every 8 (eight) hours as needed for nausea or vomiting, Disp: 30 tablet, Rfl: 3    phenazopyridine (PYRIDIUM) 95 MG tablet, Take 95 mg by mouth 3 (three) times a day as needed for bladder spasms, Disp: , Rfl:     polyethylene glycol (MIRALAX) 17 g packet, Take 17 g by mouth daily, Disp: 30 each, Rfl: 3        Malika Moya MD

## 2018-05-24 ENCOUNTER — ANESTHESIA EVENT (OUTPATIENT)
Dept: GASTROENTEROLOGY | Facility: MEDICAL CENTER | Age: 73
End: 2018-05-24
Payer: MEDICARE

## 2018-05-25 ENCOUNTER — HOSPITAL ENCOUNTER (OUTPATIENT)
Facility: MEDICAL CENTER | Age: 73
Setting detail: OUTPATIENT SURGERY
Discharge: HOME | End: 2018-05-25
Attending: INTERNAL MEDICINE | Admitting: INTERNAL MEDICINE
Payer: MEDICARE

## 2018-05-25 ENCOUNTER — ANESTHESIA (OUTPATIENT)
Dept: GASTROENTEROLOGY | Facility: MEDICAL CENTER | Age: 73
End: 2018-05-25
Payer: MEDICARE

## 2018-05-25 VITALS
OXYGEN SATURATION: 98 % | TEMPERATURE: 99 F | SYSTOLIC BLOOD PRESSURE: 121 MMHG | DIASTOLIC BLOOD PRESSURE: 62 MMHG | RESPIRATION RATE: 18 BRPM | HEIGHT: 62 IN | BODY MASS INDEX: 22.08 KG/M2 | HEART RATE: 69 BPM | WEIGHT: 120 LBS

## 2018-05-25 DIAGNOSIS — R10.84 GENERALIZED ABDOMINAL PAIN: ICD-10-CM

## 2018-05-25 DIAGNOSIS — R13.19 ESOPHAGEAL DYSPHAGIA: ICD-10-CM

## 2018-05-25 DIAGNOSIS — K21.9 GASTROESOPHAGEAL REFLUX DISEASE WITHOUT ESOPHAGITIS: ICD-10-CM

## 2018-05-25 PROCEDURE — 88305 TISSUE EXAM BY PATHOLOGIST: CPT | Performed by: PATHOLOGY

## 2018-05-25 PROCEDURE — 43239 EGD BIOPSY SINGLE/MULTIPLE: CPT | Performed by: INTERNAL MEDICINE

## 2018-05-25 PROCEDURE — 88342 IMHCHEM/IMCYTCHM 1ST ANTB: CPT | Performed by: PATHOLOGY

## 2018-05-25 RX ORDER — SODIUM CHLORIDE 9 MG/ML
125 INJECTION, SOLUTION INTRAVENOUS CONTINUOUS
Status: DISCONTINUED | OUTPATIENT
Start: 2018-05-25 | End: 2018-05-25 | Stop reason: HOSPADM

## 2018-05-25 RX ORDER — PROPOFOL 10 MG/ML
INJECTION, EMULSION INTRAVENOUS AS NEEDED
Status: DISCONTINUED | OUTPATIENT
Start: 2018-05-25 | End: 2018-05-25 | Stop reason: SURG

## 2018-05-25 RX ORDER — ONDANSETRON 2 MG/ML
INJECTION INTRAMUSCULAR; INTRAVENOUS AS NEEDED
Status: DISCONTINUED | OUTPATIENT
Start: 2018-05-25 | End: 2018-05-25 | Stop reason: SURG

## 2018-05-25 RX ORDER — LIDOCAINE HYDROCHLORIDE 20 MG/ML
INJECTION, SOLUTION EPIDURAL; INFILTRATION; INTRACAUDAL; PERINEURAL AS NEEDED
Status: DISCONTINUED | OUTPATIENT
Start: 2018-05-25 | End: 2018-05-25 | Stop reason: SURG

## 2018-05-25 RX ADMIN — SODIUM CHLORIDE 125 ML/HR: 0.9 INJECTION, SOLUTION INTRAVENOUS at 10:38

## 2018-05-25 RX ADMIN — LIDOCAINE HYDROCHLORIDE 60 MG: 20 INJECTION, SOLUTION EPIDURAL; INFILTRATION; INTRACAUDAL; PERINEURAL at 11:31

## 2018-05-25 RX ADMIN — PROPOFOL 10 MG: 10 INJECTION, EMULSION INTRAVENOUS at 11:32

## 2018-05-25 RX ADMIN — PROPOFOL 100 MG: 10 INJECTION, EMULSION INTRAVENOUS at 11:31

## 2018-05-25 RX ADMIN — PROPOFOL 20 MG: 10 INJECTION, EMULSION INTRAVENOUS at 11:37

## 2018-05-25 RX ADMIN — ONDANSETRON 4 MG: 2 INJECTION INTRAMUSCULAR; INTRAVENOUS at 11:42

## 2018-05-25 RX ADMIN — PROPOFOL 20 MG: 10 INJECTION, EMULSION INTRAVENOUS at 11:34

## 2018-05-25 RX ADMIN — PROPOFOL 20 MG: 10 INJECTION, EMULSION INTRAVENOUS at 11:39

## 2018-05-25 NOTE — H&P
History and Physical - [facility]  Feroz Eagle 81 y o  female MRN: [id number]                  HPI: Feroz Eagle is a 81y o  year old female who presents for reflux and dypshagia      REVIEW OF SYSTEMS: Per the HPI, and otherwise unremarkable      Historical Information   Past Medical History:   Diagnosis Date    Allergy to adhesive tape     tears skin    Arthritis     Back pain     Cataract     Fecal smearing     Fibromyalgia, primary     Frequent urination     Hypercholesteremia     Hypothyroid     Interstitial cystitis     has interStim    Irritable bowel syndrome     Kidney stone     Microscopic hematuria     Muscle spasm     Myofascial pain syndrome     Osteoporosis     Other allergy, initial encounter     allergy to blue dye    Pelvic pain     PONV (postoperative nausea and vomiting)     and pruritis    Rosacea     Skin cancer     left shoulder    Spondylisthesis     Urinary urgency     Wears dentures     lower partial    Wears glasses      Past Surgical History:   Procedure Laterality Date    APPENDECTOMY      CATARACT EXTRACTION Bilateral     COLONOSCOPY      HYSTERECTOMY  1971    OTHER SURGICAL HISTORY Left     limb lengthening    OTHER SURGICAL HISTORY Bilateral 2012, 2014, 2017    interStim    OTHER SURGICAL HISTORY      tendon contracture right hip    CT CYSTOURETHROSCOPY N/A 10/4/2017    Procedure: Lauren Burger;  Surgeon: Tomi Watkins MD;  [facility];  Service: Urology    SALPINGOOPHORECTOMY  2002    TONSILLECTOMY       Social History   History   Alcohol Use    Yes     Comment: few x year     History   Drug Use No     History   Smoking Status    Never Smoker   Smokeless Tobacco    Never Used     Family History   Problem Relation Age of Onset    Cancer Father      Oral cancer       Meds/Allergies     Prescriptions Prior to Admission   Medication    Calcium Carbonate (CALCIUM 600 PO)    levothyroxine 75 mcg tablet    phenazopyridine (PYRIDIUM) 95 MG tablet    polyethylene glycol (MIRALAX) 17 g packet    acetaminophen (TYLENOL) 500 mg tablet    clonazePAM (KlonoPIN) 0 5 mg tablet    dicyclomine (BENTYL) 10 mg capsule    ondansetron (ZOFRAN) 4 mg tablet       Allergies   Allergen Reactions    Sulfa Antibiotics Rash    Blue Dyes (Parenteral) Hives    Macrobid [Nitrofurantoin]     Other      Adhesive tape causes blisters    Sulfate        Objective     Blood pressure 136/70, pulse 77, temperature 99 °F (37 2 °C), temperature source Temporal, resp  rate 16, height 5' 2" (1 575 m), weight 54 4 kg (120 lb), SpO2 96 %, not currently breastfeeding        PHYSICAL EXAM    Gen: NAD  CV: RRR  CHEST: Clear  ABD: soft, NT/ND  EXT: no edema      ASSESSMENT/PLAN:  This is a 81y o  year old female here for reflux and dysphagia    PLAN:   Procedure: proceed to EGD

## 2018-05-25 NOTE — ANESTHESIA PREPROCEDURE EVALUATION
Review of Systems/Medical History  Patient summary reviewed  Chart reviewed  History of anesthetic complications (does well with Propofol sedation) PONV    Cardiovascular  Hyperlipidemia,    Pulmonary  Negative pulmonary ROS        GI/Hepatic    GERD well controlled,        Kidney stones,   Comment: Chronic IC     Endo/Other  History of thyroid disease , hypothyroidism,      GYN  Negative gynecology ROS          Hematology  Negative hematology ROS      Musculoskeletal    Arthritis     Neurology  Negative neurology ROS     Comment: fibromyalgia Psychology   Negative psychology ROS              Physical Exam    Airway    Mallampati score: I  TM Distance: <3 FB  Neck ROM: full     Dental   Comment: Many crowns including all of top center  One temporary upper crown #11, prominent overbite,     Cardiovascular  Rhythm: regular, Rate: normal, Cardiovascular exam normal    Pulmonary  Pulmonary exam normal Breath sounds clear to auscultation,     Other Findings        Anesthesia Plan  ASA Score- 2     Anesthesia Type- IV sedation with anesthesia with ASA Monitors  Additional Monitors:   Airway Plan:     Comment:   Patient says she does well with ondansetron  Plan Factors- Patient instructed to abstain from smoking on day of procedure  Patient did not smoke on day of surgery  Induction- intravenous  Postoperative Plan-     Informed Consent- Anesthetic plan and risks discussed with patient

## 2018-05-25 NOTE — OP NOTE
**** GI/ENDOSCOPY REPORT ****     PATIENT NAME: SYLVAIN GONZALES - VISIT ID:  Patient ID: [id number] YOB: 1945     INTRODUCTION: Esophagogastroduodenoscopy - A 81 female patient presents   for an outpatient Esophagogastroduodenoscopy at [address] [place] [address]  INDICATIONS: GERD  Dysphagia  CONSENT: The benefits, risks, and alternatives to the procedure were   discussed and informed consent was obtained from the patient  PREPARATION:  EKG, pulse, pulse oximetry and blood pressure were monitored   throughout the procedure  ASA Classification: Class 2 - Patient has mild   to moderate systemic disturbance that may or may not be related to the   disorder requiring surgery  MEDICATIONS: MAC anesthesia  PROCEDURE:  The endoscope was passed without difficulty through the mouth   under direct visualization and advanced to the 2nd portion of the   duodenum  The scope was withdrawn and the mucosa was carefully examined  FINDINGS:   Esophagus: Patchy esophagitis was found in the proximal third   of the esophagus  The mucosa appeared erythematous  A cold forceps biopsy   was taken  The middle third of the esophagus appeared to be normal  A   biopsy was taken  Stomach: The antrum and body of the stomach appeared to   be normal  A biopsy was taken  A single diminutive polyp was found in the   body of the stomach  A cold forceps biopsy was taken  Duodenum: Atrophic   mucosa was found in the duodenal bulb and 2nd portion of the duodenum  A   cold forceps biopsy was taken  COMPLICATIONS: There were no complications  IMPRESSIONS: Esophagitis seen in the proximal third of the esophagus  Biopsy taken  Normal middle third of the esophagus  Biopsy taken  Normal   antrum and body of the stomach  Biopsy taken  A polyp found in the body of   the stomach  Biopsy taken  Atrophic mucosa found in the duodenal bulb and   2nd portion of the duodenum  Biopsy taken  RECOMMENDATIONS: Anti-reflux measures: Raise the head of the bed 4 to 6   inches  Avoid smoking  Avoid excess coffee, tea or other caffeinated   beverages  Avoid garments that fit tightly through the abdomen  Avoid   eating before bed  Resume regular diet as tolerated  Follow-up on the   results of the biopsy specimens  ESTIMATED BLOOD LOSS:     PATHOLOGY SPECIMENS: Cold forceps biopsy taken  Associated finding:   Esophagitis  Random biopsy taken  Random biopsy taken  Cold forceps biopsy   taken  Associated finding: Polyp  Cold forceps biopsy taken  Associated   finding: Atrophic mucosa  PROCEDURE CODES:     ICD-9 Codes: 530 81 Esophageal reflux 787 2 DYSPHAGIA 530 10 Esophagitis,   unspecified 211 1 Benign neoplasm of stomach     ICD-10 Codes: K21 Gastro-esophageal reflux disease R13 10 Dysphagia,   unspecified K20 9 Esophagitis, unspecified F81 8 Neoplasm of uncertain   behavior of stomach R19 8 Other specified symptoms and signs involving the   digestive system and abdomen     PERFORMED BY: JAYANT Jackson  on 05/25/2018  Version 1, electronically signed by JAYANT Jackson  on 05/25/2018   at 11:48

## 2018-05-25 NOTE — DISCHARGE INSTRUCTIONS
Upper Endoscopy   WHAT YOU NEED TO KNOW:   An upper endoscopy is also called an upper gastrointestinal (GI) endoscopy, or an esophagogastroduodenoscopy (EGD)  You may feel bloated, gassy, or have some abdominal discomfort after your procedure  Your throat may be sore for 24 to 36 hours  You may burp or pass gas from air that is still inside your body  DISCHARGE INSTRUCTIONS:   Call 911 if:   · You have sudden chest pain or trouble breathing  Seek care immediately if:   · You feel dizzy or faint  · You have trouble swallowing  · You have severe throat pain  · Your bowel movements are very dark or black  · Your abdomen is hard and firm and you have severe pain  · You vomit blood  Contact your healthcare provider if:   · You feel full or bloated and cannot burp or pass gas  · You have not had a bowel movement for 3 days after your procedure  · You have neck pain  · You have a fever or chills  · You have nausea or are vomiting  · You have a rash or hives  · You have questions or concerns about your endoscopy  Relieve a sore throat:  Suck on throat lozenges or crushed ice  Gargle with a small amount of warm salt water  Mix 1 teaspoon of salt and 1 cup of warm water to make salt water  Relieve gas and discomfort from bloating:  Lie on your right side with a heating pad on your abdomen  Take short walks to help pass gas  Eat small meals until bloating is relieved  Rest after your procedure:  Do not drive or make important decisions until the day after your procedure  Return to your normal activity as directed  You can usually return to work the day after your procedure  Follow up with your healthcare provider as directed:  Write down your questions so you remember to ask them during your visits  © 2017 [employer] Information is for End User's use only and may not be sold, redistributed or otherwise used for commercial purposes   All illustrations and images included in CareNotes® are the copyrighted property of [employer]  or [employer] Harrell  The above information is an  only  It is not intended as medical advice for individual conditions or treatments  Talk to your doctor, nurse or pharmacist before following any medical regimen to see if it is safe and effective for you  Esophagitis   AMBULATORY CARE:   Esophagitis is inflammation or irritation of the lining of the esophagus  Common signs and symptoms of esophagitis:  Signs and symptoms depend on the cause of your esophagitis  You may have any of the following:  · Pain in the middle of your chest that may spread to your back    · Burning or pain in your esophagus, abdominal pain, or indigestion    · Trouble swallowing, or pain when you swallow    · A feeling that something is stuck in your esophagus    · Sore throat, a cough, or hoarseness    · Gagging, drooling, or wheezing    · Mouth sores (white patches), or a bad taste in your mouth or bad breath    · Nausea or vomiting    · Feeding problems or failure to thrive (young children)  Call 911 if:   · You have chest pain that does not go away within a few minutes or gets worse  Seek care immediately if:   · You feel like you have food stuck in your throat and you cannot cough it out  Contact your healthcare provider if:   · You have new or worsening symptoms, even after treatment  · You have questions or concerns about your condition or care  Treatment  may help the lining of your esophagus heal and prevent serious complications  Treatment will depend on what is causing your esophagitis  Symptoms caused by a toxic object such as a button battery need immediate treatment  Less severe causes may not need treatment  You may need any of the following if symptoms continue or get worse:  · Medicines  may be given to fight infection or to control stomach acid   Your healthcare provider may make changes to your medicines, such as changing it to a liquid form  · An elimination diet  may help you find foods that are causing your symptoms  You will stop eating certain foods that can cause esophagitis  Your healthcare provider will tell you to start eating them again one at a time  Each time you do not have symptoms, you will start eating another food from the list  Any food that does cause symptoms may be causing your esophagitis  · Surgery  may be needed if other treatments do not work  Part of your stomach can be wrapped to cover the valve between your stomach and esophagus  This helps prevent acid from backing up into your esophagus  Do not smoke:  Nicotine and other chemicals in cigarettes and cigars can cause blood vessel and lung damage  Ask your healthcare provider for information if you currently smoke and need help to quit  E-cigarettes or smokeless tobacco still contain nicotine  Talk to your healthcare provider before you use these products  Do not drink alcohol:  Alcohol can irritate your esophagus  Talk to your healthcare provider if you need help to stop drinking  Keep batteries and similar objects out of the reach of children:  Babies often put items in their mouths to explore them  Button batteries are easy to swallow and can cause serious damage  Keep the battery covers of electronic devices such as remote controls taped closed  Store all batteries and toxic materials where children cannot get to them  Use childproof locks to keep children away from dangerous materials  Manage or prevent esophagitis:   · Limit or do not eat foods that can lead to esophagitis  Foods such as oranges and salsa can irritate your esophagus  Caffeine and chocolate can cause acid reflux  High-fat and fried foods make your stomach digest food more slowly  This increases the amount of stomach acid your esophagus is exposed to  Eat small meals, and drink water with your meals   Soft foods such as yogurt and applesauce may help soothe your throat  Do not eat for at least 3 hours before you go to bed  · Prevent acid reflux  Do not bend over unless it is necessary  Acid may back up into your esophagus when you bend over  If possible, keep the head of your bed elevated while you sleep  This will help keep acid from backing up  Manage stress  Stress can make your symptoms worse or cause stomach acid to back up  · Drink more liquid when you take pills  Drink a full glass of water when you take your pills  Ask your healthcare provider if you can take your pills at least an hour before you go to bed  Follow up with your healthcare provider as directed: You may need ongoing tests or treatment  Write down your questions so you remember to ask them during your visits  © 2017 [address] [facility] Information is for End User's use only and may not be sold, redistributed or otherwise used for commercial purposes  All illustrations and images included in CareNotes® are the copyrighted property of [employer] Inc  or Filipp[employer] Harrell  The above information is an  only  It is not intended as medical advice for individual conditions or treatments  Talk to your doctor, nurse or pharmacist before following any medical regimen to see if it is safe and effective for you  Gastric Polyps   WHAT YOU NEED TO KNOW:   What are gastric polyps? Gastric polyps are growths that form in the lining of your stomach  They are not cancerous, but certain types of polyps can change into cancer  What puts me at risk for gastric polyps? · Chronic gastritis caused by NSAIDs use or ulcers    · Long-term use of proton pump inhibitor medicines (used to decrease stomach acid)    · An infection in your stomach caused by H  pylori bacteria  What are the symptoms of gastric polyps? You may have no symptoms   Large polyps may cause any of the following:  · Abdominal pain    · Indigestion    · Vomiting after meals or vomiting blood    · Dark or bloody bowel movements  How are gastric polyps diagnosed? Gastric polyps are usually found during an endoscopy for another reason  All or part of the polyp will be removed during the test  Your healthcare provider may also remove tissue from your stomach  The polyps and tissue are sent to the lab for testing  How are gastric polyps treated? Some types of polyps go away on their own  Other types may be removed if they are large, you have symptoms, or abnormal cells are found  Large polyps and abnormal cells increase your risk for cancer  You may also need antibiotics if you have an infection caused by H  pylori bacteria  Part of your stomach may be removed if the polyps cannot be removed and abnormal cells are found  When should I seek immediate care? · You have blood in your vomit  · You have dark or bloody bowel movements  · You have severe pain in your abdomen that does not go away after you take medicine  When should I contact my healthcare provider? · You have indigestion that does not go away with treatment  · You vomit after meals  · You have questions or concerns about your condition or care  CARE AGREEMENT:   You have the right to help plan your care  Learn about your health condition and how it may be treated  Discuss treatment options with your caregivers to decide what care you want to receive  You always have the right to refuse treatment  The above information is an  only  It is not intended as medical advice for individual conditions or treatments  Talk to your doctor, nurse or pharmacist before following any medical regimen to see if it is safe and effective for you  © 2017 [employer] Information is for End User's use only and may not be sold, redistributed or otherwise used for commercial purposes  All illustrations and images included in CareNotes® are the copyrighted property of [employer]  or Filippo Harrell

## 2018-05-31 NOTE — PROGRESS NOTES
Please call patient with pathology results - no celiac, she does have gastritis (inflammation of the stomach) and duodenitis  The stomach polyp was benign without potential to turn into cancer  I would recommend she take pepcid/zantac or protonix x 8 weeks to heal this inflammation  Please let me know what she decides

## 2018-06-01 DIAGNOSIS — K21.9 GASTROESOPHAGEAL REFLUX DISEASE WITHOUT ESOPHAGITIS: Primary | ICD-10-CM

## 2018-06-01 RX ORDER — RANITIDINE 150 MG/1
150 TABLET ORAL
Qty: 60 TABLET | Refills: 3 | Status: SHIPPED | OUTPATIENT
Start: 2018-06-01 | End: 2019-03-11 | Stop reason: SDUPTHER

## 2018-06-08 ENCOUNTER — OFFICE VISIT (OUTPATIENT)
Dept: GASTROENTEROLOGY | Facility: MEDICAL CENTER | Age: 73
End: 2018-06-08
Payer: MEDICARE

## 2018-06-08 VITALS
HEIGHT: 62 IN | DIASTOLIC BLOOD PRESSURE: 70 MMHG | HEART RATE: 99 BPM | SYSTOLIC BLOOD PRESSURE: 122 MMHG | TEMPERATURE: 97.6 F

## 2018-06-08 DIAGNOSIS — K21.9 GASTROESOPHAGEAL REFLUX DISEASE WITHOUT ESOPHAGITIS: Primary | ICD-10-CM

## 2018-06-08 DIAGNOSIS — R13.19 ESOPHAGEAL DYSPHAGIA: ICD-10-CM

## 2018-06-08 DIAGNOSIS — Z12.11 SCREENING FOR COLON CANCER: ICD-10-CM

## 2018-06-08 DIAGNOSIS — K59.01 SLOW TRANSIT CONSTIPATION: ICD-10-CM

## 2018-06-08 PROCEDURE — 99214 OFFICE O/P EST MOD 30 MIN: CPT | Performed by: INTERNAL MEDICINE

## 2018-06-08 NOTE — PROGRESS NOTES
[facility] - Outpatient Consultation  Marc Castellanos 81 y o  female MRN: [id number]  Encounter: [id number]      PCP: Ernestine Edwards DO  Referring: No referring provider defined for this encounter  ASSESSMENT AND PLAN:      1  Gastroesophageal reflux disease without esophagitis  Evidence of erosive disease seen on recent EGD with esophagitis, gastritis, and duodenitis confirmed on biopsies  She does not want to take PPI at this time  I recommend continued zantac  We discussed anti-reflux measures  2  Slow transit constipation  With component of pelvic floor dysfunction  She continues to follow with outside facility pelvic PT for pelvic floor dysfunction  I encourage her to continue miralax, with titration to effective dose  She will continue on 1/2 capful daily, as 1 capful was resulting in loose stools  I encouraged increased oral hydration and continued dietary fiber intake  3  Screening for colon cancer  She is due for colon cancer screenings  I discussed colonoscopy and alternatives with her  She defers colonoscopy due to concern from exacerbating her pelvic floor dysfunction  I have ordered FIT testing and we will follow up if positive  - Occult Bloood,Fecal Immunochemical; Future    4  Esophageal dysphagia  Symptoms of dysphagia persist  Possible contribution of reflux esophagitis to dysphagia  Will plan for manometry to assess esophageal motor function and rule out achalasia  - Espoh manometry/24hr ph; Future      ______________________________________________________________________    HPI:      Patient is a 74-year-old female sees me in follow-up for reflux, dysphagia, and constipation  She underwent EGD last week which demonstrated reflux esophagitis, inactive gastritis, peptic duodenitis  She does not want to take PPIs due to her concern of osteoporosis  She has started Zantac in the interim and is feeling some relief    She questions whether she can add tumeric as an adjunct treatment for reflux  She is having persistent dysphagia to harder foods  She does complain of continued bloating and lower pelvic abdominal pain, which she describes as a lower pelvic tightness  She feels that the symptoms are related to her pelvic floor dysfunction  She is feeling benefit with one cap full of MiraLax daily, however this has resulted in occasional fecal incontinence and loose stools  She will cut this down to 1/2 cap full and feels that this will benefit her significantly  Her last colonoscopy was ~10 years ago  She does not want to undergo another at this time due to her concern over exacerbating her pelvic floor dysfunction  She is agreeable to FIT testing for screening  REVIEW OF SYSTEMS:    CONSTITUTIONAL: Denies any fever, chills, rigors, and weight loss  HEENT: No earache or tinnitus  Denies hearing loss or visual disturbances  CARDIOVASCULAR: No chest pain or palpitations  RESPIRATORY: Denies any cough, hemoptysis, shortness of breath or dyspnea on exertion  GASTROINTESTINAL: As noted in the History of Present Illness  GENITOURINARY: No problems with urination  Denies any hematuria or dysuria  NEUROLOGIC: No dizziness or vertigo, denies headaches  MUSCULOSKELETAL: Denies any muscle or joint pain  SKIN: Denies skin rashes or itching  ENDOCRINE: Denies excessive thirst  Denies intolerance to heat or cold  PSYCHOSOCIAL: Denies depression or anxiety  Denies any recent memory loss         Historical Information   Past Medical History:   Diagnosis Date    Allergy to adhesive tape     tears skin    Arthritis     Back pain     Cataract     Fecal smearing     Fibromyalgia, primary     Frequent urination     Hypercholesteremia     Hypothyroid     Interstitial cystitis     has interStim    Irritable bowel syndrome     Kidney stone     Microscopic hematuria     Muscle spasm     Myofascial pain syndrome     Osteoporosis     Other allergy, initial encounter     allergy to blue dye    Pelvic pain     PONV (postoperative nausea and vomiting)     and pruritis    Rosacea     Skin cancer     left shoulder    Spondylisthesis     Urinary urgency     Wears dentures     lower partial    Wears glasses      Past Surgical History:   Procedure Laterality Date    APPENDECTOMY      CATARACT EXTRACTION Bilateral     COLONOSCOPY      ESOPHAGOGASTRODUODENOSCOPY N/A 5/25/2018    Procedure: ESOPHAGOGASTRODUODENOSCOPY (EGD); Surgeon: Kris Foreman MD;  Location: [facility]; Service: Gastroenterology    HYSTERECTOMY  1971    OTHER SURGICAL HISTORY Left     limb lengthening    OTHER SURGICAL HISTORY Bilateral 2012, 2014, 2017    interStim    OTHER SURGICAL HISTORY      tendon contracture right hip    MI CYSTOURETHROSCOPY N/A 10/4/2017    Procedure: CYSTOSCOPY;  Surgeon: Chandu Goyal MD;  Location: [facility] OR;  Service: Urology    SALPINGOOPHORECTOMY  2002    TONSILLECTOMY       Social History   History   Alcohol Use    Yes     Comment: few x year     History   Drug Use No     History   Smoking Status    Never Smoker   Smokeless Tobacco    Never Used     Family History   Problem Relation Age of Onset    Cancer Father      Oral cancer       Meds/Allergies       Current Outpatient Prescriptions:     Calcium Carbonate (CALCIUM 600 PO)    dicyclomine (BENTYL) 10 mg capsule    levothyroxine 75 mcg tablet    polyethylene glycol (MIRALAX) 17 g packet    ranitidine (ZANTAC) 150 mg tablet    Allergies   Allergen Reactions    Sulfa Antibiotics Rash    Blue Dyes (Parenteral) Hives    Macrobid [Nitrofurantoin]     Other      Adhesive tape causes blisters    Sulfate            Objective     Blood pressure 122/70, pulse 99, temperature 97 6 °F (36 4 °C), temperature source Tympanic Core, height 5' 2" (1 575 m), not currently breastfeeding  There is no height or weight on file to calculate BMI       PHYSICAL EXAM:      General Appearance:   Alert, cooperative, no distress   HEENT:   Normocephalic, atraumatic, anicteric      Neck:  Supple, symmetrical, trachea midline   Lungs:   Clear to auscultation bilaterally; no rales, rhonchi or wheezing; respirations unlabored    Heart[de-identified]   Regular rate and rhythm; no murmur, rub, or gallop  Abdomen:   Soft, non-tender, non-distended; normal bowel sounds; no masses, no organomegaly    Genitalia:   Deferred    Rectal:   Deferred    Extremities:  No cyanosis, clubbing or edema    Pulses:  2+ and symmetric    Skin:  No jaundice, rashes, or lesions    Lymph nodes:  No palpable cervical lymphadenopathy        Lab Results:     Lab Results   Component Value Date    WBC 6 67 09/18/2017    HGB 13 6 09/18/2017    HCT 41 2 09/18/2017    MCV 97 09/18/2017     09/18/2017       Lab Results   Component Value Date     09/18/2017    K 3 7 09/18/2017     09/18/2017    CO2 31 09/18/2017    ANIONGAP 5 09/18/2017    BUN 11 09/18/2017    CREATININE 0 56 (L) 09/18/2017    GLUCOSE 90 09/18/2017    CALCIUM 9 4 09/18/2017    AST 23 09/18/2017    ALT 26 09/18/2017    ALKPHOS 47 09/18/2017    PROT 7 7 09/18/2017    BILITOT 0 19 (L) 09/18/2017    EGFR 95 09/18/2017       No results found for: INR, PROTIME      Radiology Results:   No results found

## 2018-06-08 NOTE — LETTER
Alba 10, 2018     Zi Powers, [address] [address]    Patient: Feroz Eagle   YOB: 1945   Date of Visit: 6/8/2018       Dear Dr Hilda Joiner:    Thank you for referring Moisés Juarez to me for evaluation  Below are my notes for this consultation  If you have questions, please do not hesitate to call me  I look forward to following your patient along with you  Sincerely,      JAYANT Kurtz  [facility]  Mobile: [phone]  Available on [email or web address]  [email or web address]  [email or web address]           CC: No Recipients  Angel Hsu MD  6/10/2018  1:53 PM  Sign at close encounter  [facility] - Outpatient Consultation  Feroz Eagle 81 y o  female MRN: [id number]  Encounter: [id number]      PCP: Zi Powers DO  Referring: No referring provider defined for this encounter  ASSESSMENT AND PLAN:      1  Gastroesophageal reflux disease without esophagitis  Evidence of erosive disease seen on recent EGD with esophagitis, gastritis, and duodenitis confirmed on biopsies  She does not want to take PPI at this time  I recommend continued zantac  We discussed anti-reflux measures  2  Slow transit constipation  With component of pelvic floor dysfunction  She continues to follow with outside facility pelvic PT for pelvic floor dysfunction  I encourage her to continue miralax, with titration to effective dose  She will continue on 1/2 capful daily, as 1 capful was resulting in loose stools  I encouraged increased oral hydration and continued dietary fiber intake  3  Screening for colon cancer  She is due for colon cancer screenings  I discussed colonoscopy and alternatives with her  She defers colonoscopy due to concern from exacerbating her pelvic floor dysfunction  I have ordered FIT testing and we will follow up if positive  - Occult Bloood,Fecal Immunochemical; Future    4   Esophageal dysphagia  Symptoms of dysphagia persist  Possible contribution of reflux esophagitis to dysphagia  Will plan for manometry to assess esophageal motor function and rule out achalasia  - Espoh manometry/24hr ph; Future      ______________________________________________________________________    HPI:      Patient is a 27-year-old female sees me in follow-up for reflux, dysphagia, and constipation  She underwent EGD last week which demonstrated reflux esophagitis, inactive gastritis, peptic duodenitis  She does not want to take PPIs due to her concern of osteoporosis  She has started Zantac in the interim and is feeling some relief  She questions whether she can add tumeric as an adjunct treatment for reflux  She is having persistent dysphagia to harder foods  She does complain of continued bloating and lower pelvic abdominal pain, which she describes as a lower pelvic tightness  She feels that the symptoms are related to her pelvic floor dysfunction  She is feeling benefit with one cap full of MiraLax daily, however this has resulted in occasional fecal incontinence and loose stools  She will cut this down to 1/2 cap full and feels that this will benefit her significantly  Her last colonoscopy was ~10 years ago  She does not want to undergo another at this time due to her concern over exacerbating her pelvic floor dysfunction  She is agreeable to FIT testing for screening  REVIEW OF SYSTEMS:    CONSTITUTIONAL: Denies any fever, chills, rigors, and weight loss  HEENT: No earache or tinnitus  Denies hearing loss or visual disturbances  CARDIOVASCULAR: No chest pain or palpitations  RESPIRATORY: Denies any cough, hemoptysis, shortness of breath or dyspnea on exertion  GASTROINTESTINAL: As noted in the History of Present Illness  GENITOURINARY: No problems with urination  Denies any hematuria or dysuria  NEUROLOGIC: No dizziness or vertigo, denies headaches  MUSCULOSKELETAL: Denies any muscle or joint pain     SKIN: Denies skin rashes or itching  ENDOCRINE: Denies excessive thirst  Denies intolerance to heat or cold  PSYCHOSOCIAL: Denies depression or anxiety  Denies any recent memory loss  Historical Information   Past Medical History:   Diagnosis Date    Allergy to adhesive tape     tears skin    Arthritis     Back pain     Cataract     Fecal smearing     Fibromyalgia, primary     Frequent urination     Hypercholesteremia     Hypothyroid     Interstitial cystitis     has interStim    Irritable bowel syndrome     Kidney stone     Microscopic hematuria     Muscle spasm     Myofascial pain syndrome     Osteoporosis     Other allergy, initial encounter     allergy to blue dye    Pelvic pain     PONV (postoperative nausea and vomiting)     and pruritis    Rosacea     Skin cancer     left shoulder    Spondylisthesis     Urinary urgency     Wears dentures     lower partial    Wears glasses      Past Surgical History:   Procedure Laterality Date    APPENDECTOMY      CATARACT EXTRACTION Bilateral     COLONOSCOPY      ESOPHAGOGASTRODUODENOSCOPY N/A 5/25/2018    Procedure: ESOPHAGOGASTRODUODENOSCOPY (EGD); Surgeon: Fortino Corrigan MD;  Location: [facility];   Service: Gastroenterology    HYSTERECTOMY  1971    OTHER SURGICAL HISTORY Left     limb lengthening    OTHER SURGICAL HISTORY Bilateral 2012, 2014, 2017    interStim    OTHER SURGICAL HISTORY      tendon contracture right hip    [place] CYSTOURETHROSCOPY N/A 10/4/2017    Procedure: Kulwant Gordillo;  Surgeon: Sky Graff MD;  Location: [facility];  Service: Urology    SALPINGOOPHORECTOMY  2002    TONSILLECTOMY       Social History   History   Alcohol Use    Yes     Comment: few x year     History   Drug Use No     History   Smoking Status    Never Smoker   Smokeless Tobacco    Never Used     Family History   Problem Relation Age of Onset    Cancer Father      Oral cancer       Meds/Allergies       Current Outpatient Prescriptions:    Calcium Carbonate (CALCIUM 600 PO)    dicyclomine (BENTYL) 10 mg capsule    levothyroxine 75 mcg tablet    polyethylene glycol (MIRALAX) 17 g packet    ranitidine (ZANTAC) 150 mg tablet    Allergies   Allergen Reactions    Sulfa Antibiotics Rash    Blue Dyes (Parenteral) Hives    Macrobid [Nitrofurantoin]     Other      Adhesive tape causes blisters    Sulfate            Objective     Blood pressure 122/70, pulse 99, temperature 97 6 °F (36 4 °C), temperature source Tympanic Core, height 5' 2" (1 575 m), not currently breastfeeding  There is no height or weight on file to calculate BMI  PHYSICAL EXAM:      General Appearance:   Alert, cooperative, no distress   HEENT:   Normocephalic, atraumatic, anicteric      Neck:  Supple, symmetrical, trachea midline   Lungs:   Clear to auscultation bilaterally; no rales, rhonchi or wheezing; respirations unlabored    Heart[de-identified]   Regular rate and rhythm; no murmur, rub, or gallop  Abdomen:   Soft, non-tender, non-distended; normal bowel sounds; no masses, no organomegaly    Genitalia:   Deferred    Rectal:   Deferred    Extremities:  No cyanosis, clubbing or edema    Pulses:  2+ and symmetric    Skin:  No jaundice, rashes, or lesions    Lymph nodes:  No palpable cervical lymphadenopathy        Lab Results:     Lab Results   Component Value Date    WBC 6 67 09/18/2017    HGB 13 6 09/18/2017    HCT 41 2 09/18/2017    MCV 97 09/18/2017     09/18/2017       Lab Results   Component Value Date     09/18/2017    K 3 7 09/18/2017     09/18/2017    CO2 31 09/18/2017    ANIONGAP 5 09/18/2017    BUN 11 09/18/2017    CREATININE 0 56 (L) 09/18/2017    GLUCOSE 90 09/18/2017    CALCIUM 9 4 09/18/2017    AST 23 09/18/2017    ALT 26 09/18/2017    ALKPHOS 47 09/18/2017    PROT 7 7 09/18/2017    BILITOT 0 19 (L) 09/18/2017    EGFR 95 09/18/2017       No results found for: INR, PROTIME      Radiology Results:   No results found

## 2018-07-09 ENCOUNTER — OFFICE VISIT (OUTPATIENT)
Dept: UROLOGY | Facility: MEDICAL CENTER | Age: 73
End: 2018-07-09
Payer: MEDICARE

## 2018-07-09 VITALS
BODY MASS INDEX: 23 KG/M2 | DIASTOLIC BLOOD PRESSURE: 60 MMHG | SYSTOLIC BLOOD PRESSURE: 102 MMHG | HEIGHT: 62 IN | WEIGHT: 125 LBS

## 2018-07-09 DIAGNOSIS — R35.0 FREQUENCY OF MICTURITION: ICD-10-CM

## 2018-07-09 DIAGNOSIS — R10.2 PELVIC PAIN: ICD-10-CM

## 2018-07-09 DIAGNOSIS — N30.10 INTERSTITIAL CYSTITIS: Primary | ICD-10-CM

## 2018-07-09 PROCEDURE — 99214 OFFICE O/P EST MOD 30 MIN: CPT | Performed by: UROLOGY

## 2018-07-09 NOTE — LETTER
2018     Cora Morrison, [address] [facility]  [address] Prashant Mckeon [facility] [address]    Patient: Marti Waller   YOB: 1945   Date of Visit: 2018       Dear Dr Luis Mendoza:    Thank you for referring Ria Crocker to me for evaluation  Below are my notes for this consultation  If you have questions, please do not hesitate to call me  I look forward to following your patient along with you  Sincerely,        Rickey Gary MD        CC: No Recipients  Rickey Gary MD  2018  9:33 AM  Sign at close encounter  [address] [facility]  [address], [address] [place] [id number]  www  [facility]  org      NAME: Kike Lee  AGE: 81 y o  SEX: female  : 1945   MRN: [id number]    DATE: 2018  TIME: 9:19 AM    Assessment and Plan:  Interstitial cystitis:  She remains on Elmiron, and she takes breaks from this occasionally  I encouraged her to do this  Will stop Elmiron due to expense and not being sure it is actually working  Remainder as below:  InterStim is functioning well  F/U 1 year  Continue to observe stone  Chief Complaint     Chief Complaint   Patient presents with    Urinary Frequency     follow up       History of Present Illness   Follow-up interstitial cystitis, urgency and frequency, presence of InterStim generator  She also has a radiolucent 4 mm left renal calculus  No symptoms from stone  She has appointment with Dr Liudmila Meneses coming up  With the InterStim, she recently had a turned the energy down because she felt too much tightness in the labia and the anus  Energy is currently on 1 3  She feels good pulsations at energy level 1 5-1 6  Urgency and frequency:  She has good days and bad days        The following portions of the patient's history were reviewed and updated as appropriate: allergies, current medications, past family history, past medical history, past social history, past surgical history and problem list     Review of Systems   Review of Systems   Genitourinary: Positive for frequency and pelvic pain  Active Problem List     Patient Active Problem List   Diagnosis    Fecal incontinence    IC (interstitial cystitis)    Increased frequency of urination    Urinary urgency    Difficult or painful urination    Esophageal dysphagia    Generalized abdominal pain    Gastroesophageal reflux disease without esophagitis       Objective   /60   Ht 5' 2" (1 575 m)   Wt 56 7 kg (125 lb)   BMI 22 86 kg/m²      Physical Exam   Constitutional: She is oriented to person, place, and time  She appears well-developed and well-nourished  HENT:   Head: Normocephalic and atraumatic  Eyes: EOM are normal    Neck: Normal range of motion  Pulmonary/Chest: Effort normal    Musculoskeletal: Normal range of motion  Neurological: She is alert and oriented to person, place, and time  Skin: Skin is warm and dry  Psychiatric: She has a normal mood and affect   Her behavior is normal  Judgment and thought content normal            Current Medications     Current Outpatient Prescriptions:     Calcium Carbonate (CALCIUM 600 PO), Take 1 tablet by mouth 2 (two) times a day, Disp: , Rfl:     levothyroxine 50 mcg tablet, Take 75 mcg by mouth daily in the early morning, Disp: , Rfl:     polyethylene glycol (MIRALAX) 17 g packet, Take 17 g by mouth daily, Disp: 30 each, Rfl: 3    ranitidine (ZANTAC) 150 mg tablet, Take 1 tablet (150 mg total) by mouth daily at bedtime, Disp: 60 tablet, Rfl: 3    dicyclomine (BENTYL) 10 mg capsule, Take 1 capsule (10 mg total) by mouth 4 (four) times a day (before meals and at bedtime) for 30 days (Patient taking differently: Take 10 mg by mouth as needed  ), Disp: 120 capsule, Rfl: 3        Shani Duenas MD

## 2018-07-09 NOTE — PROGRESS NOTES
[address] [facility]  [facility]  [address]  [place], [address] [facility]  [phone]  www  [facility]  org      NAME: Sloane Lee  AGE: 81 y o  SEX: female  : 1945   MRN: [id number]    DATE: 2018  TIME: 9:19 AM    Assessment and Plan:  Interstitial cystitis:  She remains on Elmiron, and she takes breaks from this occasionally  I encouraged her to do this  Will stop Elmiron due to expense and not being sure it is actually working  Remainder as below:  InterStim is functioning well  F/U 1 year  Continue to observe stone  Chief Complaint     Chief Complaint   Patient presents with    Urinary Frequency     follow up       History of Present Illness   Follow-up interstitial cystitis, urgency and frequency, presence of InterStim generator  She also has a radiolucent 4 mm left renal calculus  No symptoms from stone  She has appointment with Dr Peggy Rawls coming up  With the InterStim, she recently had a turned the energy down because she felt too much tightness in the labia and the anus  Energy is currently on 1 3  She feels good pulsations at energy level 1 5-1 6  Urgency and frequency:  She has good days and bad days  The following portions of the patient's history were reviewed and updated as appropriate: allergies, current medications, past family history, past medical history, past social history, past surgical history and problem list     Review of Systems   Review of Systems   Genitourinary: Positive for frequency and pelvic pain         Active Problem List     Patient Active Problem List   Diagnosis    Fecal incontinence    IC (interstitial cystitis)    Increased frequency of urination    Urinary urgency    Difficult or painful urination    Esophageal dysphagia    Generalized abdominal pain    Gastroesophageal reflux disease without esophagitis       Objective   /60   Ht 5' 2" (1 575 m)   Wt 56 7 kg (125 lb)   BMI 22 86 kg/m²     Physical Exam   Constitutional: She is oriented to person, place, and time  She appears well-developed and well-nourished  HENT:   Head: Normocephalic and atraumatic  Eyes: EOM are normal    Neck: Normal range of motion  Pulmonary/Chest: Effort normal    Musculoskeletal: Normal range of motion  Neurological: She is alert and oriented to person, place, and time  Skin: Skin is warm and dry  Psychiatric: She has a normal mood and affect   Her behavior is normal  Judgment and thought content normal            Current Medications     Current Outpatient Prescriptions:     Calcium Carbonate (CALCIUM 600 PO), Take 1 tablet by mouth 2 (two) times a day, Disp: , Rfl:     levothyroxine 50 mcg tablet, Take 75 mcg by mouth daily in the early morning, Disp: , Rfl:     polyethylene glycol (MIRALAX) 17 g packet, Take 17 g by mouth daily, Disp: 30 each, Rfl: 3    ranitidine (ZANTAC) 150 mg tablet, Take 1 tablet (150 mg total) by mouth daily at bedtime, Disp: 60 tablet, Rfl: 3    dicyclomine (BENTYL) 10 mg capsule, Take 1 capsule (10 mg total) by mouth 4 (four) times a day (before meals and at bedtime) for 30 days (Patient taking differently: Take 10 mg by mouth as needed  ), Disp: 120 capsule, Rfl: 3        Nasrin Ramirez MD

## 2018-07-16 ENCOUNTER — OFFICE VISIT (OUTPATIENT)
Dept: GASTROENTEROLOGY | Facility: MEDICAL CENTER | Age: 73
End: 2018-07-16
Payer: MEDICARE

## 2018-07-16 VITALS
HEIGHT: 62 IN | DIASTOLIC BLOOD PRESSURE: 60 MMHG | TEMPERATURE: 98.8 F | SYSTOLIC BLOOD PRESSURE: 110 MMHG | BODY MASS INDEX: 23 KG/M2 | HEART RATE: 90 BPM | WEIGHT: 125 LBS

## 2018-07-16 DIAGNOSIS — K59.01 SLOW TRANSIT CONSTIPATION: ICD-10-CM

## 2018-07-16 DIAGNOSIS — Z12.11 SCREENING FOR COLON CANCER: ICD-10-CM

## 2018-07-16 DIAGNOSIS — K21.9 GASTROESOPHAGEAL REFLUX DISEASE WITHOUT ESOPHAGITIS: Primary | ICD-10-CM

## 2018-07-16 PROCEDURE — 99214 OFFICE O/P EST MOD 30 MIN: CPT | Performed by: INTERNAL MEDICINE

## 2018-07-16 NOTE — PROGRESS NOTES
[facility] - Outpatient Consultation  Haley Hi 81 y o  female MRN: [id number]  Encounter: [id number]      PCP: Wilfredo Cheung DO  Referring: No referring provider defined for this encounter  ASSESSMENT AND PLAN:      1  Gastroesophageal reflux disease without esophagitis  Previously ordered manometry given symptoms of dysphagia  She is chewing slowly and cutting food into small pieces  She will pursue dental evaluation prior to manometry per her preference  Continue zantac for erosive GERD seen on EGD  2  Slow transit constipation  Symptoms of IBS-C vs idiopathic constipation with associated bloating  Treatment limited with miralax despite titration  I have given her samples of linzess 72 mcg and 145 mcg  She is instructed to titrate this dose, eg may try linzess 72 mcg every other day if 72 mcg is too high  She will contact me for a prescription  3  Screening for colon cancer  Refusing colonoscopy given concern for exacerbation of pelvic floor dysfunction  She will obtain FIT and we will follow  - Occult Bloood,Fecal Immunochemical; Future      ______________________________________________________________________    HPI:      Patient is a 74-year-old female that sees me in follow-up for reflux, dysphagia, constipation  She complains of hard small stools that are difficult to pass, despite treatment with MiraLax  Treatment benefits of MiraLax were limited due to loose stools with higher doses, and ineffective the at lower doses  She complains of increased bloating, feeling of fullness, lower pelvic abdominal pain  The symptoms are worse in the morning when she wakes up  They are relieved with bowel movements  She continues to follow with pelvic floor Pilates and [facility], pelvic floor PT with Dr Haylie Nguyen  Her reflux is under well control, with Zantac use  She does note occasional flares, most recently this occurred with the intake of watermelon    She has infrequent symptoms of dysphagia, she noticed her chewing does make her swallowing or difficulty  She is working with dental evaluation for dental implants, and feels this will help  She wants to hold off on esophageal manometry until this is performed  She is not performed FIT testing as previously ordered, and states she will have this done today  REVIEW OF SYSTEMS:    CONSTITUTIONAL: Denies any fever, chills, rigors, and weight loss  HEENT: No earache or tinnitus  Denies hearing loss or visual disturbances  CARDIOVASCULAR: No chest pain or palpitations  RESPIRATORY: Denies any cough, hemoptysis, shortness of breath or dyspnea on exertion  GASTROINTESTINAL: As noted in the History of Present Illness  GENITOURINARY: No problems with urination  Denies any hematuria or dysuria  NEUROLOGIC: No dizziness or vertigo, denies headaches  MUSCULOSKELETAL: Denies any muscle or joint pain  SKIN: Denies skin rashes or itching  ENDOCRINE: Denies excessive thirst  Denies intolerance to heat or cold  PSYCHOSOCIAL: Denies depression or anxiety  Denies any recent memory loss         Historical Information   Past Medical History:   Diagnosis Date    Allergy to adhesive tape     tears skin    Arthritis     Back pain     Cataract     Fecal smearing     Fibromyalgia, primary     Frequent urination     Hypercholesteremia     Hypothyroid     Interstitial cystitis     has interStim    Irritable bowel syndrome     Kidney stone     Microscopic hematuria     Muscle spasm     Myofascial pain syndrome     Osteoporosis     Other allergy, initial encounter     allergy to blue dye    Pelvic pain     PONV (postoperative nausea and vomiting)     and pruritis    Rosacea     Skin cancer     left shoulder    Spondylisthesis     Urinary urgency     Wears dentures     lower partial    Wears glasses      Past Surgical History:   Procedure Laterality Date    APPENDECTOMY      CATARACT EXTRACTION Bilateral  COLONOSCOPY      ESOPHAGOGASTRODUODENOSCOPY N/A 5/25/2018    Procedure: ESOPHAGOGASTRODUODENOSCOPY (EGD); Surgeon: Abhinav Larios MD;  Location: [facility]; Service: Gastroenterology    HYSTERECTOMY  1971    OTHER SURGICAL HISTORY Left     limb lengthening    OTHER SURGICAL HISTORY Bilateral 2012, 2014, 2017    interStim    OTHER SURGICAL HISTORY      tendon contracture right hip    [place] CYSTOURETHROSCOPY N/A 10/4/2017    Procedure: CYSTOSCOPY;  Surgeon: Constanza Lyle MD;  Location: [facility] OR;  Service: Urology    SALPINGOOPHORECTOMY  2002    TONSILLECTOMY       Social History   History   Alcohol Use    Yes     Comment: few x year     History   Drug Use No     History   Smoking Status    Never Smoker   Smokeless Tobacco    Never Used     Family History   Problem Relation Age of Onset    Cancer Father         Oral cancer       Meds/Allergies       Current Outpatient Prescriptions:     Calcium Carbonate (CALCIUM 600 PO)    dicyclomine (BENTYL) 10 mg capsule    levothyroxine 50 mcg tablet    polyethylene glycol (MIRALAX) 17 g packet    ranitidine (ZANTAC) 150 mg tablet    Allergies   Allergen Reactions    Sulfa Antibiotics Rash    Blue Dyes (Parenteral) Hives    Macrobid [Nitrofurantoin]     Medical Tape      blisters  blisters    Other      Adhesive tape causes blisters    Sulfate            Objective     Blood pressure 110/60, pulse 90, temperature 98 8 °F (37 1 °C), temperature source Tympanic, height 5' 2" (1 575 m), weight 56 7 kg (125 lb), not currently breastfeeding  Body mass index is 22 86 kg/m²  PHYSICAL EXAM:      General Appearance:   Alert, cooperative, no distress   HEENT:   Normocephalic, atraumatic, anicteric      Neck:  Supple, symmetrical, trachea midline   Lungs:   Clear to auscultation bilaterally; no rales, rhonchi or wheezing; respirations unlabored    Heart[de-identified]   Regular rate and rhythm; no murmur, rub, or gallop     Abdomen:   Soft, non-tender, non-distended; normal bowel sounds; no masses, no organomegaly    Genitalia:   Deferred    Rectal:   Deferred    Extremities:  No cyanosis, clubbing or edema    Pulses:  2+ and symmetric    Skin:  No jaundice, rashes, or lesions    Lymph nodes:  No palpable cervical lymphadenopathy        Lab Results:     Lab Results   Component Value Date    WBC 6 67 09/18/2017    HGB 13 6 09/18/2017    HCT 41 2 09/18/2017    MCV 97 09/18/2017     09/18/2017       Lab Results   Component Value Date     09/18/2017    K 3 7 09/18/2017     09/18/2017    CO2 31 09/18/2017    ANIONGAP 5 09/18/2017    BUN 11 09/18/2017    CREATININE 0 56 (L) 09/18/2017    GLUCOSE 90 09/18/2017    CALCIUM 9 4 09/18/2017    AST 23 09/18/2017    ALT 26 09/18/2017    ALKPHOS 47 09/18/2017    PROT 7 7 09/18/2017    BILITOT 0 19 (L) 09/18/2017    EGFR 95 09/18/2017       No results found for: INR, PROTIME      Radiology Results:   No results found

## 2018-07-16 NOTE — LETTER
July 16, 2018     Sywlia Rankin, [facility] [address] Prashant Mckeon [place] [address]    Patient: Yudi Barrera   YOB: 1945   Date of Visit: 7/16/2018       Dear Dr Kirstin Chung:    Thank you for referring Taty Headings to me for evaluation  Below are my notes for this consultation  If you have questions, please do not hesitate to call me  I look forward to following your patient along with you  Sincerely,      JAYANT Schumacher  [facility]  Mobile: [phone]  Available on [employer]  [email or web address]           CC: No Recipients  Miriam Varma MD  7/16/2018  9:30 AM  Sign at close encounter  [facility] - Outpatient Consultation  Yudi Barrera 81 y o  female MRN: [id number]  Encounter: [id number]      PCP: Sylwia Rankin DO  Referring: No referring provider defined for this encounter  ASSESSMENT AND PLAN:      1  Gastroesophageal reflux disease without esophagitis  Previously ordered manometry given symptoms of dysphagia  She is chewing slowly and cutting food into small pieces  She will pursue dental evaluation prior to manometry per her preference  Continue zantac for erosive GERD seen on EGD  2  Slow transit constipation  Symptoms of IBS-C vs idiopathic constipation with associated bloating  Treatment limited with miralax despite titration  I have given her samples of linzess 72 mcg and 145 mcg  She is instructed to titrate this dose, eg may try linzess 72 mcg every other day if 72 mcg is too high  She will contact me for a prescription  3  Screening for colon cancer  Refusing colonoscopy given concern for exacerbation of pelvic floor dysfunction  She will obtain FIT and we will follow  - Occult Bloood,Fecal Immunochemical; Future      ______________________________________________________________________    HPI:      Patient is a 17-year-old female that sees me in follow-up for reflux, dysphagia, constipation  She complains of hard small stools that are difficult to pass, despite treatment with MiraLax  Treatment benefits of MiraLax were limited due to loose stools with higher doses, and ineffective the at lower doses  She complains of increased bloating, feeling of fullness, lower pelvic abdominal pain  The symptoms are worse in the morning when she wakes up  They are relieved with bowel movements  She continues to follow with pelvic floor Maryjo and Ahsan, pelvic floor PT with Dr Antoinette Smart  Her reflux is under well control, with Zantac use  She does note occasional flares, most recently this occurred with the intake of watermelon  She has infrequent symptoms of dysphagia, she noticed her chewing does make her swallowing or difficulty  She is working with dental evaluation for dental implants, and feels this will help  She wants to hold off on esophageal manometry until this is performed  She is not performed FIT testing as previously ordered, and states she will have this done today  REVIEW OF SYSTEMS:    CONSTITUTIONAL: Denies any fever, chills, rigors, and weight loss  HEENT: No earache or tinnitus  Denies hearing loss or visual disturbances  CARDIOVASCULAR: No chest pain or palpitations  RESPIRATORY: Denies any cough, hemoptysis, shortness of breath or dyspnea on exertion  GASTROINTESTINAL: As noted in the History of Present Illness  GENITOURINARY: No problems with urination  Denies any hematuria or dysuria  NEUROLOGIC: No dizziness or vertigo, denies headaches  MUSCULOSKELETAL: Denies any muscle or joint pain  SKIN: Denies skin rashes or itching  ENDOCRINE: Denies excessive thirst  Denies intolerance to heat or cold  PSYCHOSOCIAL: Denies depression or anxiety  Denies any recent memory loss         Historical Information   Past Medical History:   Diagnosis Date    Allergy to adhesive tape     tears skin    Arthritis     Back pain     Cataract     Fecal smearing     Fibromyalgia, primary     Frequent urination     Hypercholesteremia     Hypothyroid     Interstitial cystitis     has interStim    Irritable bowel syndrome     Kidney stone     Microscopic hematuria     Muscle spasm     Myofascial pain syndrome     Osteoporosis     Other allergy, initial encounter     allergy to blue dye    Pelvic pain     PONV (postoperative nausea and vomiting)     and pruritis    Rosacea     Skin cancer     left shoulder    Spondylisthesis     Urinary urgency     Wears dentures     lower partial    Wears glasses      Past Surgical History:   Procedure Laterality Date    APPENDECTOMY      CATARACT EXTRACTION Bilateral     COLONOSCOPY      ESOPHAGOGASTRODUODENOSCOPY N/A 5/25/2018    Procedure: ESOPHAGOGASTRODUODENOSCOPY (EGD); Surgeon: Rachel Almanza MD;  Location: [facility];   Service: Gastroenterology    HYSTERECTOMY  1971    OTHER SURGICAL HISTORY Left     limb lengthening    OTHER SURGICAL HISTORY Bilateral 2012, 2014, 2017    interStim    OTHER SURGICAL HISTORY      tendon contracture right hip    FL CYSTOURETHROSCOPY N/A 10/4/2017    Procedure: Taylor Mullins;  Surgeon: Clemon Canavan, MD;  Location: [facility] OR;  Service: Urology    SALPINGOOPHORECTOMY  2002    TONSILLECTOMY       Social History   History   Alcohol Use    Yes     Comment: few x year     History   Drug Use No     History   Smoking Status    Never Smoker   Smokeless Tobacco    Never Used     Family History   Problem Relation Age of Onset    Cancer Father         Oral cancer       Meds/Allergies       Current Outpatient Prescriptions:     Calcium Carbonate (CALCIUM 600 PO)    dicyclomine (BENTYL) 10 mg capsule    levothyroxine 50 mcg tablet    polyethylene glycol (MIRALAX) 17 g packet    ranitidine (ZANTAC) 150 mg tablet    Allergies   Allergen Reactions    Sulfa Antibiotics Rash    Blue Dyes (Parenteral) Hives    Macrobid [Nitrofurantoin]     Medical Tape      blisters  blisters    Other      Adhesive tape causes blisters    Sulfate            Objective     Blood pressure 110/60, pulse 90, temperature 98 8 °F (37 1 °C), temperature source Tympanic, height 5' 2" (1 575 m), weight 56 7 kg (125 lb), not currently breastfeeding  Body mass index is 22 86 kg/m²  PHYSICAL EXAM:      General Appearance:   Alert, cooperative, no distress   HEENT:   Normocephalic, atraumatic, anicteric      Neck:  Supple, symmetrical, trachea midline   Lungs:   Clear to auscultation bilaterally; no rales, rhonchi or wheezing; respirations unlabored    Heart[de-identified]   Regular rate and rhythm; no murmur, rub, or gallop  Abdomen:   Soft, non-tender, non-distended; normal bowel sounds; no masses, no organomegaly    Genitalia:   Deferred    Rectal:   Deferred    Extremities:  No cyanosis, clubbing or edema    Pulses:  2+ and symmetric    Skin:  No jaundice, rashes, or lesions    Lymph nodes:  No palpable cervical lymphadenopathy        Lab Results:     Lab Results   Component Value Date    WBC 6 67 09/18/2017    HGB 13 6 09/18/2017    HCT 41 2 09/18/2017    MCV 97 09/18/2017     09/18/2017       Lab Results   Component Value Date     09/18/2017    K 3 7 09/18/2017     09/18/2017    CO2 31 09/18/2017    ANIONGAP 5 09/18/2017    BUN 11 09/18/2017    CREATININE 0 56 (L) 09/18/2017    GLUCOSE 90 09/18/2017    CALCIUM 9 4 09/18/2017    AST 23 09/18/2017    ALT 26 09/18/2017    ALKPHOS 47 09/18/2017    PROT 7 7 09/18/2017    BILITOT 0 19 (L) 09/18/2017    EGFR 95 09/18/2017       No results found for: INR, PROTIME      Radiology Results:   No results found

## 2018-09-20 ENCOUNTER — EVALUATION (OUTPATIENT)
Dept: PHYSICAL THERAPY | Facility: MEDICAL CENTER | Age: 73
End: 2018-09-20
Payer: MEDICARE

## 2018-09-20 DIAGNOSIS — M79.7 FIBROMYALGIA: ICD-10-CM

## 2018-09-20 DIAGNOSIS — M54.50 CHRONIC BILATERAL LOW BACK PAIN WITHOUT SCIATICA: Primary | ICD-10-CM

## 2018-09-20 DIAGNOSIS — G89.29 CHRONIC BILATERAL LOW BACK PAIN WITHOUT SCIATICA: Primary | ICD-10-CM

## 2018-09-20 PROCEDURE — G8991 OTHER PT/OT GOAL STATUS: HCPCS | Performed by: PHYSICAL THERAPIST

## 2018-09-20 PROCEDURE — G8990 OTHER PT/OT CURRENT STATUS: HCPCS | Performed by: PHYSICAL THERAPIST

## 2018-09-20 PROCEDURE — 97162 PT EVAL MOD COMPLEX 30 MIN: CPT | Performed by: PHYSICAL THERAPIST

## 2018-09-20 NOTE — LETTER
2018    Dhruv Bess DDS  [address] [facility] [address]  [id number]    Patient: Marilee Acosta   YOB: 1945   Date of Visit: 2018     Encounter Diagnosis     ICD-10-CM    1  Chronic bilateral low back pain without sciatica M54 5     G89 29    2  Fibromyalgia M79 7        Dear Dr Yonis Amado:    Please review the attached Plan of Care from Becca Mckay recent visit  Please verify that you agree therapy should continue by signing the attached document and sending it back to our office  If you have any questions or concerns, please don't hesitate to call  Sincerely,    Tatiana Hinson, PT      Referring Provider:      I certify that I have read the below Plan of Care and certify the need for these services furnished under this plan of treatment while under my care  Dhruv Bess, [address] [place] [address]  [address]  [address] [facility] [facility] [address]: [phone]          PT Evaluation     Today's date: 2018  Patient name: Marilee Acosta  : 1945  MRN: [id number]  Referring provider: Luanne Hogan DDS  Dx:   Encounter Diagnosis     ICD-10-CM    1  Chronic bilateral low back pain without sciatica M54 5     G89 29    2  Fibromyalgia M79 7      The following portions of the patient's history were reviewed and updated as appropriate: allergies, current medications, past family history, past medical history, past social history, past surgical history and problem list  Please also see Care everywhere as patient has long and complicated history  Assessment/Plan   Patient is a very pleasant 80 yo female who presents with symptoms of pain, decreased lumbar rom, loss of function and inability to exercise as desired secondary to advanced degenerative changes in her lower back leading to mechanical back pain and poor muscular control  Patient's symptoms are complicated and exacerbated by fibromyalgia  Patient demonstrates spasm and severe tightness throughout lumbar and LE  She will benefit from skilled PT to address her issues of pain and restoration of function  Therapy will consist of manual therapy to return normal motion to lumbar spine  Flexibility program to address tightness, and neuromuscular eduction to address poor muscle coordination  Patient is expected to progress well with treatment 2x a week for 4-8 weeks  Prognosis is fair as she has a long history of pain and goals for treatment are to return her to her normal 5/10 pain  Subjective  Pain: 8/10  Patient states that she has been in pain for 60 years  Long history of myofascial issues and fibromyalgia  Patient notes that over the past several months her lower back and leg pain has worsened  She had epidural which helped with leg pain however she continues to have severe pain in her lower back  She would like to return her pain levels to 5/10  Patient notes that she is having issues with prolonged positioning and waking in the morning  She has come to OPPT to address her issues of pain and return to more normal function for her  Patient is considering facet injections from Dr Bucky Thomas with [facility]  Objective   Posture: Patient listing to the left (pain on the right)  AROM:  Limited in all plains of lumbar motion     Patient has catching with extension and inability or unwillingness to move through motion  PROM:  Unable to test  PAROM:  Decreased L3-5 right with comparable sign  Neural hypersensitivity however normal sensation and reflexes(full body reaction to left patellar test)  Palpation:  Severe pain with all palpation even light touch of lower back skin and hips  Ambulation:  Asymmetrical (which patient reports is normal)  Goals  - Pt I with initial HEP in 1-2 visits  - Improve ROM tolerance to full available motion without guarding  - Increase Functional Status Measure measured by FOTO by [facility]  - Patient will be independent with comprehensive HEP in 6-8 weeks  - Return to ADL's as desired  - Pain reduction to 5/10        Flowsheet Rows      Most Recent Value   PT/OT G-Codes   Current Score  26   Projected Score  52   FOTO information reviewed  Yes   Assessment Type  Evaluation   G code set  Other PT/OT Primary   Other PT Primary Current Status ()  CL   Other PT Primary Goal Status ()  CJ          Precautions: long history of lower back neck pain and fibromyalgia    Daily Treatment Diary     Manual  9/20            UPA right L3-5 Gr   Iv--            Myofascial release 5min                                                       Exercise Diary                                                                                                                                                                                                                                                                                      Modalities              Heat seated 10min

## 2018-09-20 NOTE — PROGRESS NOTES
PT Evaluation     Today's date: 2018  Patient name: Burt Adame  : 1945  MRN: [id number]  Referring provider: Jonny Eid DDS  Dx:   Encounter Diagnosis     ICD-10-CM    1  Chronic bilateral low back pain without sciatica M54 5     G89 29    2  Fibromyalgia M79 7      The following portions of the patient's history were reviewed and updated as appropriate: allergies, current medications, past family history, past medical history, past social history, past surgical history and problem list  Please also see Care everywhere as patient has long and complicated history  Assessment/Plan   Patient is a very pleasant 82 yo female who presents with symptoms of pain, decreased lumbar rom, loss of function and inability to exercise as desired secondary to advanced degenerative changes in her lower back leading to mechanical back pain and poor muscular control  Patient's symptoms are complicated and exacerbated by fibromyalgia  Patient demonstrates spasm and severe tightness throughout lumbar and LE  She will benefit from skilled PT to address her issues of pain and restoration of function  Therapy will consist of manual therapy to return normal motion to lumbar spine  Flexibility program to address tightness, and neuromuscular eduction to address poor muscle coordination  Patient is expected to progress well with treatment 2x a week for 4-8 weeks  Prognosis is fair as she has a long history of pain and goals for treatment are to return her to her normal 5/10 pain  Subjective  Pain: 810  Patient states that she has been in pain for 60 years  Long history of myofascial issues and fibromyalgia  Patient notes that over the past several months her lower back and leg pain has worsened  She had epidural which helped with leg pain however she continues to have severe pain in her lower back  She would like to return her pain levels to 5/10    Patient notes that she is having issues with prolonged positioning and waking in the morning  She has come to OPPT to address her issues of pain and return to more normal function for her  Patient is considering facet injections from Dr Marline Michel with [facility]  Objective   Posture: Patient listing to the left (pain on the right)  AROM:  Limited in all plains of lumbar motion  Patient has catching with extension and inability or unwillingness to move through motion  PROM:  Unable to test  PAROM:  Decreased L3-5 right with comparable sign  Neural hypersensitivity however normal sensation and reflexes(full body reaction to left patellar test)  Palpation:  Severe pain with all palpation even light touch of lower back skin and hips  Ambulation:  Asymmetrical (which patient reports is normal)  Goals  - Pt I with initial HEP in 1-2 visits  - Improve ROM tolerance to full available motion without guarding  - Increase Functional Status Measure measured by FOTO by [facility]  - Patient will be independent with comprehensive HEP in 6-8 weeks  - Return to ADL's as desired  - Pain reduction to 5/10        Flowsheet Rows      Most Recent Value   PT/OT G-Codes   Current Score  26   Projected Score  52   FOTO information reviewed  Yes   Assessment Type  Evaluation   G code set  Other PT/OT Primary   Other PT Primary Current Status ()  CL   Other PT Primary Goal Status ()  CJ          Precautions: long history of lower back neck pain and fibromyalgia    Daily Treatment Diary     Manual  9/20            UPA right L3-5 Gr   Iv--            Myofascial release 5min                                                       Exercise Diary                                                                                                                                                                                                                                                                                      Modalities              Heat seated 10min

## 2018-09-24 ENCOUNTER — LAB (OUTPATIENT)
Dept: LAB | Facility: MEDICAL CENTER | Age: 73
End: 2018-09-24
Attending: INTERNAL MEDICINE
Payer: MEDICARE

## 2018-09-24 DIAGNOSIS — Z12.11 SCREENING FOR COLON CANCER: ICD-10-CM

## 2018-09-24 LAB — HEMOCCULT STL QL IA: NEGATIVE

## 2018-09-24 PROCEDURE — G0328 FECAL BLOOD SCRN IMMUNOASSAY: HCPCS

## 2018-09-26 ENCOUNTER — OFFICE VISIT (OUTPATIENT)
Dept: PHYSICAL THERAPY | Facility: MEDICAL CENTER | Age: 73
End: 2018-09-26
Payer: MEDICARE

## 2018-09-26 DIAGNOSIS — M79.7 FIBROMYALGIA: ICD-10-CM

## 2018-09-26 DIAGNOSIS — G89.29 CHRONIC BILATERAL LOW BACK PAIN WITHOUT SCIATICA: Primary | ICD-10-CM

## 2018-09-26 DIAGNOSIS — M54.50 CHRONIC BILATERAL LOW BACK PAIN WITHOUT SCIATICA: Primary | ICD-10-CM

## 2018-09-26 PROCEDURE — 97110 THERAPEUTIC EXERCISES: CPT | Performed by: PHYSICAL THERAPIST

## 2018-09-26 NOTE — PROGRESS NOTES
Daily Note     Today's date: 2018  Patient name: Kojo Singleton  : 1945  MRN: [id number]  Referring provider: Riki Trujillo DDS  Dx:   Encounter Diagnosis     ICD-10-CM    1  Chronic bilateral low back pain without sciatica M54 5     G89 29    2  Fibromyalgia M79 7                   Subjective: patient states that she is having issues as she normally does with pain throughout her body and intolerance to activities  Objective: See treatment diary below     Precautions: fibromyalgia    Daily Treatment Diary     Manual                                                                                   Exercise Diary              Hip flexor str             Hamstring str             Piriformis str             LTR 10            SKTC 10            SLR 10            Bridging with ball between knees 10x2            Clamshell with band 10x2 orange band            Standing hip abduction 8            Standing hip extension             Knee extension seated 10x  2lbs                         Walking  7min                                                                                                           Modalities                           Moist heat                                    Assessment: Tolerated treatment well  Patient demonstrated fatigue post treatment, exhibited good technique with therapeutic exercises and would benefit from continued PT      Plan: Continue per plan of care

## 2018-09-27 ENCOUNTER — OFFICE VISIT (OUTPATIENT)
Dept: GASTROENTEROLOGY | Facility: MEDICAL CENTER | Age: 73
End: 2018-09-27
Payer: MEDICARE

## 2018-09-27 VITALS
SYSTOLIC BLOOD PRESSURE: 126 MMHG | HEART RATE: 92 BPM | TEMPERATURE: 97.7 F | BODY MASS INDEX: 23 KG/M2 | HEIGHT: 62 IN | WEIGHT: 125 LBS | DIASTOLIC BLOOD PRESSURE: 64 MMHG

## 2018-09-27 DIAGNOSIS — R13.19 ESOPHAGEAL DYSPHAGIA: Primary | ICD-10-CM

## 2018-09-27 DIAGNOSIS — K58.1 IRRITABLE BOWEL SYNDROME WITH CONSTIPATION: ICD-10-CM

## 2018-09-27 DIAGNOSIS — K21.9 GASTROESOPHAGEAL REFLUX DISEASE WITHOUT ESOPHAGITIS: ICD-10-CM

## 2018-09-27 DIAGNOSIS — M79.18 MYOFASCIAL PAIN SYNDROME: ICD-10-CM

## 2018-09-27 PROCEDURE — 99214 OFFICE O/P EST MOD 30 MIN: CPT | Performed by: PHYSICIAN ASSISTANT

## 2018-09-27 NOTE — PROGRESS NOTES
[facility] - Outpatient Follow-up Note  Tee Son 81 y o  female MRN: [id number]  Encounter: [id number]          ASSESSMENT AND PLAN:    1  Gastroesophageal reflux disease without esophagitis  2  Esophageal dysphagia  3  Sjogren's syndrome  4  Myofascial pain syndrome   - she had an upper endoscopy that showed gastritis, she began Zantac for this    -Previously ordered manometry given symptoms of dysphagia  She is extremely hesitant to complete this as she is afraid of uncomfortable testing  We discussed possible options and she would prefer to start with a barium esophagram to evaluate her esophageal motility though she understands that if this is abnormal, the esophageal manometry may be the next step    -She is chewing slowly and cutting food into small pieces  She alternates food with sips of water which she feels has been very helpful  She notes that due to her Sjogren she has difficulty producing secretions and her mouth is very dry  She also notes that she has difficulty chewing secondary to myofascial pain syndrome  She has been evaluated for this in the past but has not had lasting results  Recommend trial of physical therapy, provided a referral for this    -She discontinued to Zantac as she felt this gave her insomnia; she reports her reflux symptoms are currently well controlled with dietary changes       3  IBS-C   -  She reports that overall since her last visit her symptoms are somewhat improved although she does continue to have difficulty titrating laxatives as  she has fecal incontinence if her stools are too soft    -She has associated bloating and occasional abdominal pain, she uses Bentyl for this    -Currently she is using Cynthia gum and feels this is very helpful for controlling her IBS symptoms      4  Screening for colon cancer   -She is refusing colonoscopy given concern for exacerbation of pelvic floor dysfunction     -Her FIT test was negative, she should repeat this yearly  She requests to follow up prior to her upcoming cruise in November  ____________________________________________________________    SUBJECTIVE:     42-year-old female  with past medical history of fibromyalgia, myofascial pain syndrome, pelvic floor syndrome, interstitial cystitis and Sjogren's presenting for follow-up of multiple GI complaints  She reports that she continues to have some difficulty with swallowing that she believes this is secondary to decreased secretions from her Sjogren's  She states that her mouth is frequently dry and soft, moist foods are easier for her to swallow  She typically only has difficulty with dry foods such as bread or meat  She does have difficulty chewing secondary to her myofascial pain syndrome and fibromyalgia  She continues to have bloating and occasional constipation, she uses a Linzess periodically but states that even this 72 mcg dose gives herself stools which results in some fecal incontinence for her  She has also tried MiraLax in the past with the same result  Recently she has been using petr gum which she feels has helped with her symptoms more than other remedies she has tried  She recently went on a trip to [facility] and was eating heavy, fatty foods and noted recurrence of her reflux symptoms that resolved when she came home and resumed her normal diet  She states that she only uses the Bentyl occasionally as this does give her some constipation  REVIEW OF SYSTEMS IS OTHERWISE NEGATIVE        Historical Information   Past Medical History:   Diagnosis Date    Allergy to adhesive tape     tears skin    Arthritis     Back pain     Cataract     Fecal smearing     Fibromyalgia, primary     Frequent urination     Hypercholesteremia     Hypothyroid     Interstitial cystitis     has interStim    Irritable bowel syndrome     Kidney stone     Microscopic hematuria     Muscle spasm     Myofascial pain syndrome     Osteoporosis     Other allergy, initial encounter     allergy to blue dye    Pelvic pain     PONV (postoperative nausea and vomiting)     and pruritis    Rosacea     Skin cancer     left shoulder    Spondylisthesis     Urinary urgency     Wears dentures     lower partial    Wears glasses      Past Surgical History:   Procedure Laterality Date    APPENDECTOMY      CATARACT EXTRACTION Bilateral     COLONOSCOPY      ESOPHAGOGASTRODUODENOSCOPY N/A 5/25/2018    Procedure: ESOPHAGOGASTRODUODENOSCOPY (EGD); Surgeon: Rachel Almanza MD;  Location: [facility]; Service: Gastroenterology    HYSTERECTOMY  1971    OTHER SURGICAL HISTORY Left     limb lengthening    OTHER SURGICAL HISTORY Bilateral 2012, 2014, 2017    interStim    OTHER SURGICAL HISTORY      tendon contracture right hip    [place] CYSTOURETHROSCOPY N/A 10/4/2017    Procedure: Taylor Mullins;  Surgeon: Clemon Canavan, MD;  Location: [facility] OR;  Service: Urology    SALPINGOOPHORECTOMY  2002    TONSILLECTOMY       Social History   History   Alcohol Use    Yes     Comment: few x year     History   Drug Use No     History   Smoking Status    Never Smoker   Smokeless Tobacco    Never Used     Family History   Problem Relation Age of Onset    Cancer Father         Oral cancer       Meds/Allergies       Current Outpatient Prescriptions:     Calcium Carbonate (CALCIUM 600 PO)    dicyclomine (BENTYL) 10 mg capsule    levothyroxine 50 mcg tablet    polyethylene glycol (MIRALAX) 17 g packet    ranitidine (ZANTAC) 150 mg tablet    Allergies   Allergen Reactions    Sulfa Antibiotics Rash    Blue Dyes (Parenteral) Hives    Macrobid [Nitrofurantoin]     Medical Tape      blisters  blisters    Other      Adhesive tape causes blisters    Sulfate            Objective     not currently breastfeeding  PHYSICAL EXAM:      Physical Exam   Constitutional: She is oriented to person, place, and time  She appears well-developed and well-nourished   No distress  HENT:   Head: Normocephalic and atraumatic  Eyes: Right eye exhibits no discharge  Left eye exhibits no discharge  No scleral icterus  Neck: Neck supple  No tracheal deviation present  Cardiovascular: Normal rate, regular rhythm and normal heart sounds  Exam reveals no gallop and no friction rub  No murmur heard  Pulmonary/Chest: Effort normal  No respiratory distress  She has no wheezes  She has no rales  Abdominal: Soft  Bowel sounds are normal  She exhibits no distension  There is no tenderness  There is no rebound and no guarding  Neurological: She is alert and oriented to person, place, and time  Skin: Skin is warm and dry  Psychiatric: She has a normal mood and affect  Lab Results:   No visits with results within 1 Day(s) from this visit  Latest known visit with results is:   Lab on 09/24/2018   Component Date Value    OCCULT BLD, FECAL IMMUNO* 09/24/2018 Negative          Radiology Results:   No results found

## 2018-09-27 NOTE — PATIENT INSTRUCTIONS
The patient is scheduled on 10/8/18 for her Barium Swallow at [facility]) She was given a PT referral and will call to schedule  She will return on 10/26/18 to see Dr July Barrera

## 2018-09-28 ENCOUNTER — OFFICE VISIT (OUTPATIENT)
Dept: PHYSICAL THERAPY | Facility: MEDICAL CENTER | Age: 73
End: 2018-09-28
Payer: MEDICARE

## 2018-09-28 DIAGNOSIS — M54.50 CHRONIC BILATERAL LOW BACK PAIN WITHOUT SCIATICA: Primary | ICD-10-CM

## 2018-09-28 DIAGNOSIS — G89.29 CHRONIC BILATERAL LOW BACK PAIN WITHOUT SCIATICA: Primary | ICD-10-CM

## 2018-09-28 DIAGNOSIS — M79.7 FIBROMYALGIA: ICD-10-CM

## 2018-09-28 PROCEDURE — 97110 THERAPEUTIC EXERCISES: CPT | Performed by: PHYSICAL THERAPIST

## 2018-09-28 NOTE — PROGRESS NOTES
Daily Note     Today's date: 2018  Patient name: Shaina Barber  : 1945  MRN: [id number]  Referring provider: Callie Lundberg DDS  Dx:   Encounter Diagnosis     ICD-10-CM    1  Chronic bilateral low back pain without sciatica M54 5     G89 29    2  Fibromyalgia M79 7                   Subjective: patient states that she is having issues as she normally does with pain throughout her body and intolerance to activities  Objective: See treatment diary below     Precautions: fibromyalgia    Daily Treatment Diary     Manual                                                                                   Exercise Diary              Hip flexor str             Hamstring str             Piriformis str             LTR 10            SKTC 10            SLR 10            Bridging with ball between knees 10x2            Clamshell with band 10x2 orange band            Standing hip abduction 8            Standing hip extension             Knee extension seated 10x  2lbs                         Walking  8min                                                                                                           Modalities                           Moist heat                                    Assessment: Tolerated treatment well  Patient demonstrated fatigue post treatment, exhibited good technique with therapeutic exercises and would benefit from continued PT      Plan: Continue per plan of care

## 2018-10-03 ENCOUNTER — TELEPHONE (OUTPATIENT)
Dept: GASTROENTEROLOGY | Facility: MEDICAL CENTER | Age: 73
End: 2018-10-03

## 2018-10-03 ENCOUNTER — OFFICE VISIT (OUTPATIENT)
Dept: PHYSICAL THERAPY | Facility: MEDICAL CENTER | Age: 73
End: 2018-10-03
Payer: MEDICARE

## 2018-10-03 DIAGNOSIS — M54.50 CHRONIC BILATERAL LOW BACK PAIN WITHOUT SCIATICA: Primary | ICD-10-CM

## 2018-10-03 DIAGNOSIS — M79.7 FIBROMYALGIA: ICD-10-CM

## 2018-10-03 DIAGNOSIS — G89.29 CHRONIC BILATERAL LOW BACK PAIN WITHOUT SCIATICA: Primary | ICD-10-CM

## 2018-10-03 PROCEDURE — 97110 THERAPEUTIC EXERCISES: CPT | Performed by: PHYSICAL THERAPIST

## 2018-10-03 PROCEDURE — 97140 MANUAL THERAPY 1/> REGIONS: CPT | Performed by: PHYSICAL THERAPIST

## 2018-10-03 NOTE — TELEPHONE ENCOUNTER
The patient stopped by the office to cancel her Esophogram that was scheduled  I did that for her  She will try to PT that [facility] recommended

## 2018-10-03 NOTE — PROGRESS NOTES
Daily Note     Today's date: 10/3/2018  Patient name: Alysa Brink  : 1945  MRN: [id number]  Referring provider: Monica Garcia DDS  Dx:   Encounter Diagnosis     ICD-10-CM    1  Chronic bilateral low back pain without sciatica M54 5     G89 29    2  Fibromyalgia M79 7                   Subjective: patient states that she is having issues as she normally does with pain throughout her body and intolerance to activities  Objective: See treatment diary below     Precautions: fibromyalgia    Daily Treatment Diary     Manual  10/3            Long leg distraction gs            Bent leg distraction gs                                                       Exercise Diary              Hip flexor str             Hamstring str             Piriformis str             LTR 10            SKTC 10            SLR 10            Bridging with ball between knees 10x2            Clamshell with band 10x2 orange band            Standing hip abduction 8            Standing hip extension             Knee extension seated 10x  2lbs                         Walking  8min                                                                                                           Modalities                           Moist heat                                    Assessment: Tolerated treatment well  Patient demonstrated fatigue post treatment, exhibited good technique with therapeutic exercises and would benefit from continued PT      Plan: Continue per plan of care

## 2018-10-05 ENCOUNTER — OFFICE VISIT (OUTPATIENT)
Dept: PHYSICAL THERAPY | Facility: MEDICAL CENTER | Age: 73
End: 2018-10-05
Payer: MEDICARE

## 2018-10-05 DIAGNOSIS — G89.29 CHRONIC BILATERAL LOW BACK PAIN WITHOUT SCIATICA: Primary | ICD-10-CM

## 2018-10-05 DIAGNOSIS — M54.50 CHRONIC BILATERAL LOW BACK PAIN WITHOUT SCIATICA: Primary | ICD-10-CM

## 2018-10-05 DIAGNOSIS — M79.7 FIBROMYALGIA: ICD-10-CM

## 2018-10-05 PROCEDURE — 97110 THERAPEUTIC EXERCISES: CPT | Performed by: PHYSICAL THERAPIST

## 2018-10-05 PROCEDURE — 97140 MANUAL THERAPY 1/> REGIONS: CPT | Performed by: PHYSICAL THERAPIST

## 2018-10-05 PROCEDURE — 97010 HOT OR COLD PACKS THERAPY: CPT | Performed by: PHYSICAL THERAPIST

## 2018-10-05 NOTE — PROGRESS NOTES
Daily Note     Today's date: 10/5/2018  Patient name: Jesse Larsen  : 1945  MRN: [id number]  Referring provider: Aliyah Lee DDS  Dx:   Encounter Diagnosis     ICD-10-CM    1  Chronic bilateral low back pain without sciatica M54 5     G89 29    2  Fibromyalgia M79 7                   Subjective: patient states that she was getting off the toilet this morning and felt spasm in her upper back  Notes that the pain that she has been coming to therapy for is the best it has been in 17 years  Objective: See treatment diary below     Precautions: fibromyalgia    Daily Treatment Diary     Manual  10/5            Long leg distraction gs            Bent leg distraction gs                                                       Exercise Diary              Hip flexor str             Hamstring str             Piriformis str             LTR 10            SKTC 10            SLR 10            Bridging with ball between knees 10x2            Clamshell with band 10x2 orange band            Standing hip abduction 8            Standing hip extension             Knee extension seated 10x  2lbs                         Walking  8min                                                                                                           Modalities                           Moist heat 10 min                                   Assessment: Tolerated treatment well  Patient demonstrated fatigue post treatment, exhibited good technique with therapeutic exercises and would benefit from continued PT  Added heat and gentle stretching to account for increase in spasm  Patient was feeling better at conclusion of treatment  Plan: Continue per plan of care

## 2018-10-08 ENCOUNTER — OFFICE VISIT (OUTPATIENT)
Dept: PHYSICAL THERAPY | Facility: MEDICAL CENTER | Age: 73
End: 2018-10-08
Payer: MEDICARE

## 2018-10-08 ENCOUNTER — APPOINTMENT (OUTPATIENT)
Dept: PHYSICAL THERAPY | Facility: MEDICAL CENTER | Age: 73
End: 2018-10-08
Payer: MEDICARE

## 2018-10-08 DIAGNOSIS — G89.29 CHRONIC BILATERAL LOW BACK PAIN WITHOUT SCIATICA: Primary | ICD-10-CM

## 2018-10-08 DIAGNOSIS — M79.7 FIBROMYALGIA: ICD-10-CM

## 2018-10-08 DIAGNOSIS — M54.50 CHRONIC BILATERAL LOW BACK PAIN WITHOUT SCIATICA: Primary | ICD-10-CM

## 2018-10-08 PROCEDURE — 97110 THERAPEUTIC EXERCISES: CPT | Performed by: PHYSICAL THERAPIST

## 2018-10-08 PROCEDURE — 97140 MANUAL THERAPY 1/> REGIONS: CPT | Performed by: PHYSICAL THERAPIST

## 2018-10-08 NOTE — PROGRESS NOTES
Daily Note     Today's date: 10/8/2018  Patient name: Alysa Brink  : 1945  MRN: [id number]  Referring provider: Monica Garcia DDS  Dx:   Encounter Diagnosis     ICD-10-CM    1  Chronic bilateral low back pain without sciatica M54 5     G89 29    2  Fibromyalgia M79 7                   Subjective: patient states that she is feeling much better than she was last week  Her symptoms are returned to status quo and she notes that her lower back is feeling a lot better  Objective: See treatment diary below     Precautions: fibromyalgia    Daily Treatment Diary     Manual  10/8            Long leg distraction gs            Bent leg distraction gs                                                       Exercise Diary              Hip flexor str             Hamstring str             Piriformis str             LTR 10x2            SKTC 10x2            SLR 10x2            Bridging with ball between knees 10x2            Clamshell with band 10x2 orange band            Standing hip abduction 8            Standing hip extension 8            Knee extension seated 10x  2lbs                         Walking  8min                                                                                                           Modalities                           Moist heat 10 min                                   Assessment: Tolerated treatment well  Patient demonstrated fatigue post treatment, exhibited good technique with therapeutic exercises and would benefit from continued PT  Patient is progressing very slowly but in a positive direction  She noted feeling better than she ever has  Plan: Continue per plan of care

## 2018-10-10 ENCOUNTER — TRANSCRIBE ORDERS (OUTPATIENT)
Dept: ADMINISTRATIVE | Facility: HOSPITAL | Age: 73
End: 2018-10-10

## 2018-10-10 ENCOUNTER — APPOINTMENT (OUTPATIENT)
Dept: LAB | Facility: MEDICAL CENTER | Age: 73
End: 2018-10-10
Payer: MEDICARE

## 2018-10-10 ENCOUNTER — OFFICE VISIT (OUTPATIENT)
Dept: PHYSICAL THERAPY | Facility: MEDICAL CENTER | Age: 73
End: 2018-10-10
Payer: MEDICARE

## 2018-10-10 DIAGNOSIS — M79.7 FIBROMYALGIA: ICD-10-CM

## 2018-10-10 DIAGNOSIS — G89.29 CHRONIC BILATERAL LOW BACK PAIN WITHOUT SCIATICA: Primary | ICD-10-CM

## 2018-10-10 DIAGNOSIS — M54.50 CHRONIC BILATERAL LOW BACK PAIN WITHOUT SCIATICA: Primary | ICD-10-CM

## 2018-10-10 DIAGNOSIS — E03.9 MYXEDEMA HEART DISEASE: ICD-10-CM

## 2018-10-10 DIAGNOSIS — I51.9 MYXEDEMA HEART DISEASE: Primary | ICD-10-CM

## 2018-10-10 DIAGNOSIS — E03.9 MYXEDEMA HEART DISEASE: Primary | ICD-10-CM

## 2018-10-10 DIAGNOSIS — I51.9 MYXEDEMA HEART DISEASE: ICD-10-CM

## 2018-10-10 LAB — TSH SERPL DL<=0.05 MIU/L-ACNC: 4.62 UIU/ML (ref 0.36–3.74)

## 2018-10-10 PROCEDURE — 36415 COLL VENOUS BLD VENIPUNCTURE: CPT

## 2018-10-10 PROCEDURE — 97110 THERAPEUTIC EXERCISES: CPT | Performed by: PHYSICAL THERAPIST

## 2018-10-10 PROCEDURE — 84443 ASSAY THYROID STIM HORMONE: CPT

## 2018-10-10 NOTE — PROGRESS NOTES
Daily Note     Today's date: 10/10/2018  Patient name: Sonny Nelson  : 1945  MRN: [id number]  Referring provider: Beverly Arteaga DDS  Dx:   Encounter Diagnosis     ICD-10-CM    1  Chronic bilateral low back pain without sciatica M54 5     G89 29    2  Fibromyalgia M79 7                   Subjective: patient states that she is feeling more sore today in her hips B from a very busy day yesterday but still continues to be progressing with strength  Objective: See treatment diary below     Precautions: fibromyalgia    Daily Treatment Diary     Manual  10/10            Long leg distraction gs            Bent leg distraction gs                                                       Exercise Diary              Hip flexor str             Hamstring str             Piriformis str             LTR 10x2            SKTC 10x2            SLR 10x2            Bridging with ball between knees 10x2            Clamshell with band 10x2 orange band            Standing hip abduction 8            Standing hip extension 8            Knee extension seated 10x  2lbs                         Walking  8min                                                                                                           Modalities                           Moist heat 10 min                                   Assessment: Tolerated treatment well  Patient demonstrated fatigue post treatment, exhibited good technique with therapeutic exercises and would benefit from continued PT  Patient is progressing very slowly but in a positive direction  Patient's reports of stiffness and soreness seem to resolve very quickly when performing TE  Plan: Continue per plan of care

## 2018-10-15 ENCOUNTER — APPOINTMENT (OUTPATIENT)
Dept: PHYSICAL THERAPY | Facility: MEDICAL CENTER | Age: 73
End: 2018-10-15
Payer: MEDICARE

## 2018-10-19 ENCOUNTER — OFFICE VISIT (OUTPATIENT)
Dept: PHYSICAL THERAPY | Facility: MEDICAL CENTER | Age: 73
End: 2018-10-19
Payer: MEDICARE

## 2018-10-19 DIAGNOSIS — M54.50 CHRONIC BILATERAL LOW BACK PAIN WITHOUT SCIATICA: Primary | ICD-10-CM

## 2018-10-19 DIAGNOSIS — M79.7 FIBROMYALGIA: ICD-10-CM

## 2018-10-19 DIAGNOSIS — G89.29 CHRONIC BILATERAL LOW BACK PAIN WITHOUT SCIATICA: Primary | ICD-10-CM

## 2018-10-19 PROCEDURE — 97110 THERAPEUTIC EXERCISES: CPT | Performed by: PHYSICAL THERAPIST

## 2018-10-19 PROCEDURE — 97140 MANUAL THERAPY 1/> REGIONS: CPT | Performed by: PHYSICAL THERAPIST

## 2018-10-19 PROCEDURE — G8990 OTHER PT/OT CURRENT STATUS: HCPCS | Performed by: PHYSICAL THERAPIST

## 2018-10-19 PROCEDURE — G8991 OTHER PT/OT GOAL STATUS: HCPCS | Performed by: PHYSICAL THERAPIST

## 2018-10-19 NOTE — PROGRESS NOTES
Daily Note     Today's date: 10/19/2018  Patient name: Therese Ham  : 1945  MRN: [id number]  Referring provider: Rc Montes DDS  Dx:   Encounter Diagnosis     ICD-10-CM    1  Chronic bilateral low back pain without sciatica M54 5     G89 29    2  Fibromyalgia M79 7                   Subjective: patient states that she is feeling more sore today in her hips B from a very busy day yesterday but still continues to be progressing with strength  Objective: See treatment diary below     Precautions: fibromyalgia    Daily Treatment Diary     Manual  10/19            Long leg distraction gs            Bent leg distraction gs                                                       Exercise Diary              Hip flexor str             Hamstring str             Piriformis str             LTR 10x2            SKTC 10x2            SLR 10x2            Bridging with ball between knees 10x2            Clamshell with band 10x2 orange band            Standing hip abduction 8            Standing hip extension 8            Knee extension seated 10x  2lbs                         Walking  8min                                                                                                           Modalities                           Moist heat 10 min                                   Assessment: Tolerated treatment well  Patient demonstrated fatigue post treatment, exhibited good technique with therapeutic exercises and would benefit from continued PT  Patient is progressing very slowly but in a positive direction  Patient's reports of stiffness and soreness seem to resolve very quickly when performing TE  Plan: Continue per plan of care

## 2018-10-26 ENCOUNTER — APPOINTMENT (OUTPATIENT)
Dept: PHYSICAL THERAPY | Facility: MEDICAL CENTER | Age: 73
End: 2018-10-26
Payer: MEDICARE

## 2018-10-31 ENCOUNTER — APPOINTMENT (OUTPATIENT)
Dept: PHYSICAL THERAPY | Facility: MEDICAL CENTER | Age: 73
End: 2018-10-31
Payer: MEDICARE

## 2018-11-01 ENCOUNTER — TELEPHONE (OUTPATIENT)
Dept: GASTROENTEROLOGY | Facility: MEDICAL CENTER | Age: 73
End: 2018-11-01

## 2018-11-01 DIAGNOSIS — R11.0 NAUSEA: Primary | ICD-10-CM

## 2018-11-01 RX ORDER — ONDANSETRON 4 MG/1
4 TABLET, ORALLY DISINTEGRATING ORAL EVERY 8 HOURS PRN
Qty: 60 TABLET | Refills: 1 | Status: SHIPPED | OUTPATIENT
Start: 2018-11-01 | End: 2019-07-18 | Stop reason: ALTCHOICE

## 2018-11-01 NOTE — TELEPHONE ENCOUNTER
Dr Natasha Ferrer pt called stating she needs a refill on her zofran 4mg tab to be taken every 8hrs as needed   Pt can be reached at [phone]

## 2019-01-14 ENCOUNTER — TRANSCRIBE ORDERS (OUTPATIENT)
Dept: ADMINISTRATIVE | Facility: HOSPITAL | Age: 74
End: 2019-01-14

## 2019-01-14 DIAGNOSIS — H90.3 SENSORY HEARING LOSS, BILATERAL: Primary | ICD-10-CM

## 2019-01-25 ENCOUNTER — HOSPITAL ENCOUNTER (OUTPATIENT)
Dept: CT IMAGING | Facility: HOSPITAL | Age: 74
Discharge: HOME | End: 2019-01-25
Attending: OTOLARYNGOLOGY
Payer: MEDICARE

## 2019-01-25 DIAGNOSIS — H90.3 SENSORY HEARING LOSS, BILATERAL: ICD-10-CM

## 2019-01-25 PROCEDURE — 70450 CT HEAD/BRAIN W/O DYE: CPT

## 2019-03-01 ENCOUNTER — APPOINTMENT (EMERGENCY)
Dept: RADIOLOGY | Facility: HOSPITAL | Age: 74
End: 2019-03-01
Payer: MEDICARE

## 2019-03-01 ENCOUNTER — HOSPITAL ENCOUNTER (EMERGENCY)
Facility: HOSPITAL | Age: 74
Discharge: HOME | End: 2019-03-01
Attending: EMERGENCY MEDICINE | Admitting: EMERGENCY MEDICINE
Payer: MEDICARE

## 2019-03-01 VITALS
BODY MASS INDEX: 24.91 KG/M2 | RESPIRATION RATE: 18 BRPM | WEIGHT: 135.36 LBS | HEIGHT: 62 IN | OXYGEN SATURATION: 97 % | TEMPERATURE: 98 F | SYSTOLIC BLOOD PRESSURE: 134 MMHG | DIASTOLIC BLOOD PRESSURE: 66 MMHG | HEART RATE: 96 BPM

## 2019-03-01 DIAGNOSIS — M81.0 OSTEOPOROSIS: ICD-10-CM

## 2019-03-01 DIAGNOSIS — M79.7 FIBROMYALGIA: ICD-10-CM

## 2019-03-01 DIAGNOSIS — M54.6 ACUTE THORACIC BACK PAIN: Primary | ICD-10-CM

## 2019-03-01 PROCEDURE — 99283 EMERGENCY DEPT VISIT LOW MDM: CPT

## 2019-03-01 PROCEDURE — 72072 X-RAY EXAM THORAC SPINE 3VWS: CPT
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
## 2019-03-11 ENCOUNTER — EVALUATION (OUTPATIENT)
Dept: PHYSICAL THERAPY | Facility: MEDICAL CENTER | Age: 74
End: 2019-03-11
Payer: MEDICARE

## 2019-03-11 ENCOUNTER — TRANSCRIBE ORDERS (OUTPATIENT)
Dept: PHYSICAL THERAPY | Facility: MEDICAL CENTER | Age: 74
End: 2019-03-11

## 2019-03-11 DIAGNOSIS — S22.000A CLOSED COMPRESSION FRACTURE OF THORACIC VERTEBRA, INITIAL ENCOUNTER (HCC): Primary | ICD-10-CM

## 2019-03-11 DIAGNOSIS — K21.9 GASTROESOPHAGEAL REFLUX DISEASE WITHOUT ESOPHAGITIS: ICD-10-CM

## 2019-03-11 PROCEDURE — 97161 PT EVAL LOW COMPLEX 20 MIN: CPT | Performed by: PHYSICAL THERAPIST

## 2019-03-11 RX ORDER — RANITIDINE 150 MG/1
150 TABLET ORAL
Qty: 60 TABLET | Refills: 3 | Status: SHIPPED | OUTPATIENT
Start: 2019-03-11 | End: 2019-07-18 | Stop reason: ALTCHOICE

## 2019-03-11 NOTE — TELEPHONE ENCOUNTER
akua pt    Please send a refill for ranitidine to [facility] on [facility] [phone]
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
## 2019-04-02 ENCOUNTER — APPOINTMENT (OUTPATIENT)
Dept: PHYSICAL THERAPY | Facility: MEDICAL CENTER | Age: 74
End: 2019-04-02
Payer: MEDICARE

## 2019-04-03 ENCOUNTER — APPOINTMENT (OUTPATIENT)
Dept: PHYSICAL THERAPY | Facility: MEDICAL CENTER | Age: 74
End: 2019-04-03
Payer: MEDICARE

## 2019-04-16 ENCOUNTER — OFFICE VISIT (OUTPATIENT)
Dept: PHYSICAL THERAPY | Facility: MEDICAL CENTER | Age: 74
End: 2019-04-16
Payer: MEDICARE

## 2019-04-16 DIAGNOSIS — S22.000D CLOSED COMPRESSION FRACTURE OF THORACIC VERTEBRA WITH ROUTINE HEALING, SUBSEQUENT ENCOUNTER: Primary | ICD-10-CM

## 2019-04-16 PROCEDURE — 97164 PT RE-EVAL EST PLAN CARE: CPT | Performed by: PHYSICAL THERAPIST

## 2019-06-05 ENCOUNTER — EVALUATION (OUTPATIENT)
Dept: PHYSICAL THERAPY | Facility: MEDICAL CENTER | Age: 74
End: 2019-06-05
Payer: MEDICARE

## 2019-06-05 ENCOUNTER — TRANSCRIBE ORDERS (OUTPATIENT)
Dept: PHYSICAL THERAPY | Facility: MEDICAL CENTER | Age: 74
End: 2019-06-05

## 2019-06-05 DIAGNOSIS — M79.605 LEFT LEG PAIN: Primary | ICD-10-CM

## 2019-06-05 PROCEDURE — 97161 PT EVAL LOW COMPLEX 20 MIN: CPT | Performed by: PHYSICAL THERAPIST

## 2019-06-13 ENCOUNTER — OFFICE VISIT (OUTPATIENT)
Dept: PHYSICAL THERAPY | Facility: MEDICAL CENTER | Age: 74
End: 2019-06-13
Payer: MEDICARE

## 2019-06-13 DIAGNOSIS — M79.605 LEFT LEG PAIN: Primary | ICD-10-CM

## 2019-06-13 PROCEDURE — 97110 THERAPEUTIC EXERCISES: CPT | Performed by: PHYSICAL THERAPY ASSISTANT

## 2019-06-13 PROCEDURE — 97112 NEUROMUSCULAR REEDUCATION: CPT | Performed by: PHYSICAL THERAPY ASSISTANT

## 2019-06-18 ENCOUNTER — OFFICE VISIT (OUTPATIENT)
Dept: PHYSICAL THERAPY | Facility: MEDICAL CENTER | Age: 74
End: 2019-06-18
Payer: MEDICARE

## 2019-06-18 DIAGNOSIS — M79.605 LEFT LEG PAIN: Primary | ICD-10-CM

## 2019-06-18 PROCEDURE — 97110 THERAPEUTIC EXERCISES: CPT | Performed by: PHYSICAL THERAPIST

## 2019-06-27 ENCOUNTER — OFFICE VISIT (OUTPATIENT)
Dept: PHYSICAL THERAPY | Facility: MEDICAL CENTER | Age: 74
End: 2019-06-27
Payer: MEDICARE

## 2019-06-27 DIAGNOSIS — M79.605 LEFT LEG PAIN: Primary | ICD-10-CM

## 2019-06-27 PROCEDURE — 97110 THERAPEUTIC EXERCISES: CPT | Performed by: PHYSICAL THERAPIST

## 2019-07-17 RX ORDER — ATORVASTATIN CALCIUM 10 MG/1
TABLET, FILM COATED ORAL
COMMUNITY
Start: 2019-06-21 | End: 2019-07-18 | Stop reason: ALTCHOICE

## 2019-07-17 RX ORDER — GABAPENTIN 300 MG/1
300 CAPSULE ORAL 3 TIMES DAILY
COMMUNITY
End: 2021-08-10 | Stop reason: ALTCHOICE

## 2019-07-17 RX ORDER — GABAPENTIN 300 MG/1
400 CAPSULE ORAL 2 TIMES DAILY
Refills: 0 | COMMUNITY
Start: 2019-05-31 | End: 2021-03-31 | Stop reason: DRUGHIGH

## 2019-07-17 RX ORDER — DESIPRAMINE HYDROCHLORIDE 25 MG/1
TABLET ORAL
Refills: 1 | COMMUNITY
Start: 2019-05-08 | End: 2019-07-18 | Stop reason: ALTCHOICE

## 2019-07-17 RX ORDER — GABAPENTIN 100 MG/1
CAPSULE ORAL
COMMUNITY
Start: 2019-07-08 | End: 2019-07-18 | Stop reason: SDUPTHER

## 2019-07-17 RX ORDER — CELECOXIB 100 MG/1
CAPSULE ORAL
COMMUNITY
End: 2019-07-18 | Stop reason: ALTCHOICE

## 2019-07-17 RX ORDER — EZETIMIBE 10 MG/1
TABLET ORAL
COMMUNITY
Start: 2019-05-16 | End: 2021-03-31 | Stop reason: ALTCHOICE

## 2019-07-17 RX ORDER — CLONAZEPAM 0.5 MG/1
0.5 TABLET ORAL DAILY PRN
COMMUNITY
End: 2021-03-31 | Stop reason: ALTCHOICE

## 2019-07-18 ENCOUNTER — APPOINTMENT (OUTPATIENT)
Dept: LAB | Facility: MEDICAL CENTER | Age: 74
End: 2019-07-18
Payer: MEDICARE

## 2019-07-18 ENCOUNTER — OFFICE VISIT (OUTPATIENT)
Dept: UROLOGY | Facility: MEDICAL CENTER | Age: 74
End: 2019-07-18
Payer: MEDICARE

## 2019-07-18 VITALS
DIASTOLIC BLOOD PRESSURE: 60 MMHG | HEIGHT: 60 IN | HEART RATE: 63 BPM | SYSTOLIC BLOOD PRESSURE: 118 MMHG | BODY MASS INDEX: 23.36 KG/M2 | WEIGHT: 119 LBS

## 2019-07-18 DIAGNOSIS — N30.10 INTERSTITIAL CYSTITIS: Primary | ICD-10-CM

## 2019-07-18 PROCEDURE — 99213 OFFICE O/P EST LOW 20 MIN: CPT | Performed by: UROLOGY

## 2019-07-18 RX ORDER — LEVOTHYROXINE SODIUM 0.05 MG/1
75 TABLET ORAL DAILY
COMMUNITY

## 2019-07-29 ENCOUNTER — HOSPITAL ENCOUNTER (OUTPATIENT)
Dept: CT IMAGING | Facility: HOSPITAL | Age: 74
Discharge: HOME | End: 2019-07-29
Payer: MEDICARE

## 2019-07-29 DIAGNOSIS — H93.A3 PULSATILE TINNITUS OF BOTH EARS: ICD-10-CM

## 2019-07-29 PROCEDURE — 70496 CT ANGIOGRAPHY HEAD: CPT

## 2019-07-29 PROCEDURE — 70498 CT ANGIOGRAPHY NECK: CPT

## 2019-07-29 RX ADMIN — IOHEXOL 85 ML: 350 INJECTION, SOLUTION INTRAVENOUS at 14:11

## 2019-09-11 ENCOUNTER — CONSULT (OUTPATIENT)
Dept: NEUROSURGERY | Facility: CLINIC | Age: 74
End: 2019-09-11
Payer: MEDICARE

## 2019-09-11 VITALS
DIASTOLIC BLOOD PRESSURE: 64 MMHG | BODY MASS INDEX: 23.56 KG/M2 | RESPIRATION RATE: 18 BRPM | WEIGHT: 120 LBS | TEMPERATURE: 97.5 F | HEART RATE: 87 BPM | SYSTOLIC BLOOD PRESSURE: 138 MMHG | HEIGHT: 60 IN

## 2019-09-11 DIAGNOSIS — I65.22 STENOSIS OF CAVERNOUS PORTION OF LEFT INTERNAL CAROTID ARTERY: Primary | ICD-10-CM

## 2019-09-11 DIAGNOSIS — H93.A3 PULSATILE TINNITUS OF BOTH EARS: ICD-10-CM

## 2019-09-11 DIAGNOSIS — I65.22 STENOSIS OF LEFT INTERNAL CAROTID ARTERY: ICD-10-CM

## 2019-09-11 PROCEDURE — 99204 OFFICE O/P NEW MOD 45 MIN: CPT | Performed by: NEUROLOGICAL SURGERY

## 2019-09-11 NOTE — PROGRESS NOTES
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  -     IR cerebral angiography; Future  -     Ambulatory referral to Neurology; Future    Pulsatile tinnitus of both ears  -     Ambulatory referral to Neurosurgery  -     IR cerebral angiography; Future  -     Ambulatory referral to Neurology; Future    Stenosis of left internal carotid artery  -     Ambulatory referral to Neurosurgery  -     IR cerebral angiography; Future  -     Ambulatory referral to Neurology; Future        Discussion Summary:   1  Asymptomatic intracranial atherosclerotic disease, left cavernous segment  On CTA appears to have significant degree of atherosclerotic disease/narrowing of the cavernous segment  There appears to be a patent anterior communicating artery as well as posterior communicating artery  There does not appear to be evidence of flow limitation  We discussed the natural history and diagnosis of intracranial atherosclerotic disease as well as indication for intervention/surgical treatment of this disease versus medical management  We discussed optimal medical management including LDL goal of less than 70, anti-platelet usage, exercise of 30 minutes a day for at least 3 days a week, and blood pressure control  In addition I have referred her to Neurology for optimization of her stroke risk factors  Formal diagnostic arteriogram will be performed for evaluation of her pulsatile tinnitus and may provide further information regarding the degree of stenosis  2  Bilateral pulsatile tinnitus  This is occurred and worsened over the last several months  It is in both ears  She has not had any significant visual symptoms in accompaniment with this  We discussed the diagnosis of dural AV fistula as well as the difficulty of diagnosing this with a CTA    She does have an enlarged left vein along the floor for middle fossa draining to the posterior fossa  This may be normal   Ideally we would obtain an MRA however this isn't possible due to her bladder stimulator and may also be false the negative  As such we discussed the gold standard imaging of a diagnostic cerebral arteriogram   She understands that there are risks and benefits of this procedure specifically including injury to her blood vessel, hematoma, and stroke  Plan will be to proceed with this in the near future  Chief Complaint: Consult (Left artery stenosis)      HPI:   This is a pleasant 53-year-old female with a longstanding history of fibromyalgia and myofascial pain who was had tinnitus for decades  Unfortunately she then began to experience hearing loss in her left ear as well as pulsatile tinnitus in both her years since May  She has been seeing ENT and an acoustic neuroma was ruled out with a noncontrast head CT and subsequently with a CTA  She is unable to have MRI due to a bladder stimulator  In the course of evaluating this she was found to have a left cavernous segment stenosis and referred for management  She was also referred for evaluation of the passed the tendon this as structural abnormality has not been found  Her past medical history significant for fibromyalgia, hypertension, hyperlipidemia, and hypothyroidism  Her past surgical history significant for bladder stimulator, limb lengthening surgery, hysterectomy, breast biopsy, an ovarian mass resection  She is a retired nurse  She is   She has the stepmother to 2 children  She denies any alcohol or tobacco abuse  She has multiple medical allergies including sulfa, statins, Macrobid, and medical tape  She currently takes clonazepam, Voltaren, dicyclomine , Neurontin, and levothyroxine  No family history of intracranial hemorrhage    Her mother did have evidence of dural venous sinus thrombosis at the age of 90     Review of systems obtained by the MA reviewed and updated below  Review of Systems   Constitutional: Positive for fatigue  Negative for chills and fever  HENT: Positive for ear pain (EAR PRESSURE) and tinnitus  Negative for nosebleeds and sore throat  Eyes: Negative  Respiratory: Negative  Cardiovascular: Negative  Gastrointestinal: Negative  Endocrine: Negative  Genitourinary: Positive for frequency  Musculoskeletal: Positive for back pain, gait problem (UNSTEADY, FELL A FEW WEEKS AGO), myalgias, neck pain and neck stiffness  Skin: Negative  Allergic/Immunologic: Negative  Neurological: Positive for dizziness, weakness (ARMS AND LEGS), light-headedness and headaches  Negative for tremors, seizures, speech difficulty and numbness  Hematological: Bruises/bleeds easily  Psychiatric/Behavioral: Negative  Physical Exam  Vitals:    09/11/19 1355   BP: 138/64   Pulse: 87   Resp: 18   Temp: 97 5 °F (36 4 °C)   She is well appearing  Affect is appropriate  Body mass index is 23 44 kg/m²  [address] She is awake alert and oriented  Hearing and vision are grossly intact  Her pupils are equal round reactive to light  Her extraocular movements are intact  Her face is symmetric  Tongue is midline  Facial sensation is intact and symmetric throughout  Shoulder shrug is 5/5  There is no drift or dysmetria  She has full strength in her bilateral upper and lower extremities  She has normal muscle tone muscle bulk  Her biceps reflexes and patellar reflexes are 2+ and symmetric  Davie sign negative bilaterally  Sensation intact to light touch and pinprick throughout  Her gait is normal      Her heart rate is regular  Her breath sounds are clear  2+ radial pulses no carotid bruit  No occipital bruit         The following portions of the patient's history were reviewed and updated as appropriate: allergies, current medications, past family history, past medical history, past social history, past surgical history and problem list     Active Ambulatory Problems     Diagnosis Date Noted    Fecal incontinence 05/24/2012    IC (interstitial cystitis) 08/04/2017    Increased frequency of urination 05/24/2012    Urinary urgency 08/04/2017    Difficult or painful urination 08/04/2017    Esophageal dysphagia 04/20/2018    Generalized abdominal pain 04/20/2018    Gastroesophageal reflux disease without esophagitis 04/20/2018    Nausea 11/01/2018    Pulsatile tinnitus of both ears 09/11/2019    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 09/11/2019     Resolved Ambulatory Problems     Diagnosis Date Noted    No Resolved Ambulatory Problems     Past Medical History:   Diagnosis Date    Allergy to adhesive tape     Arthritis     Back pain     Cataract     Fecal smearing     Fibromyalgia, primary     Frequent urination     Hypercholesteremia     Hyperlipidemia     Hypothyroid     Hypothyroidism     Interstitial cystitis     Interstitial cystitis     Irritable bowel syndrome     Kidney stone     Microscopic hematuria     Muscle spasm     Myofascial pain syndrome     Osteoarthritis     Osteoporosis     Other allergy, initial encounter     Pelvic floor dysfunction     Pelvic pain     PONV (postoperative nausea and vomiting)     Rosacea     Seasonal allergies     Shingles     Sjogren's disease ([address] )     Skin cancer     Spondylisthesis     Wears dentures     Wears glasses        Past Surgical History:   Procedure Laterality Date    ADENOIDECTOMY      APPENDECTOMY      CATARACT EXTRACTION Bilateral     COLONOSCOPY      ESOPHAGOGASTRODUODENOSCOPY N/A 5/25/2018    Procedure: ESOPHAGOGASTRODUODENOSCOPY (EGD); Surgeon: Drew Dueñas MD;  Location: [facility];   Service: Gastroenterology    HYSTERECTOMY  1971    OTHER SURGICAL HISTORY Left     limb lengthening    OTHER SURGICAL HISTORY Bilateral 2012, 2014, 2017    interStim    OTHER SURGICAL HISTORY      tendon contracture right hip    OR CYSTOURETHROSCOPY N/A 10/4/2017    Procedure: CYSTOSCOPY;  Surgeon: Sky Graff MD;  Location: [facility];  Service: Urology    SALPINGOOPHORECTOMY  2002    SEPTOPLASTY  1980    due to fracture  with repair    TONSILLECTOMY           Current Outpatient Medications:     clonazePAM (KlonoPIN) 0 5 mg tablet, Take 0 5 mg by mouth daily as needed, Disp: , Rfl:     diclofenac sodium (VOLTAREN) 1 %, diclofenac 1 % topical gel  APPLY SPARINGLY TO AFFECTED AREA(S) ONCE DAILY prn, Disp: , Rfl:     ezetimibe (ZETIA) 10 mg tablet, ezetimibe 10 mg tablet  1qd, Disp: , Rfl:     gabapentin (NEURONTIN) 300 mg capsule, Take 400 mg by mouth 2 (two) times a day , Disp: , Rfl: 0    levothyroxine 50 mcg tablet, Take 50 mcg by mouth daily, Disp: , Rfl:     gabapentin (NEURONTIN) 300 mg capsule, Take 300 mg by mouth Three times a day, Disp: , Rfl:     levothyroxine 50 mcg tablet, Take 75 mcg by mouth daily in the early morning, Disp: , Rfl:     Results/Data:  CTA reviewed in detail  Evidence of left cavernous segment stenosis  Patent AcoA and PCoA

## 2019-09-18 DIAGNOSIS — H93.A3 PULSATILE TINNITUS OF BOTH EARS: Primary | ICD-10-CM

## 2019-09-18 DIAGNOSIS — I65.22 STENOSIS OF LEFT INTERNAL CAROTID ARTERY: ICD-10-CM

## 2019-09-19 ENCOUNTER — EVALUATION (OUTPATIENT)
Dept: PHYSICAL THERAPY | Facility: MEDICAL CENTER | Age: 74
End: 2019-09-19
Payer: MEDICARE

## 2019-09-19 ENCOUNTER — TRANSCRIBE ORDERS (OUTPATIENT)
Dept: PHYSICAL THERAPY | Facility: MEDICAL CENTER | Age: 74
End: 2019-09-19

## 2019-09-19 DIAGNOSIS — M79.7 FIBROMYALGIA: ICD-10-CM

## 2019-09-19 DIAGNOSIS — G51.8 CRANIOFACIAL PAIN SYNDROME: Primary | ICD-10-CM

## 2019-09-19 DIAGNOSIS — R51.9 CRANIOFACIAL PAIN: ICD-10-CM

## 2019-09-19 PROCEDURE — 97140 MANUAL THERAPY 1/> REGIONS: CPT | Performed by: PHYSICAL THERAPIST

## 2019-09-19 PROCEDURE — 97112 NEUROMUSCULAR REEDUCATION: CPT | Performed by: PHYSICAL THERAPIST

## 2019-09-19 PROCEDURE — 97163 PT EVAL HIGH COMPLEX 45 MIN: CPT | Performed by: PHYSICAL THERAPIST

## 2019-09-19 NOTE — PROGRESS NOTES
PT Evaluation     Today's date: 2019  Patient name: Monica Castellanos  : 1945  MRN: [id number]  Referring provider: Nicholas Gordon DO  Dx:   Encounter Diagnosis     ICD-10-CM    1  Craniofacial pain syndrome G51 8    2  Craniofacial pain R51                   Assessment  Assessment details: Monica Castellanos is a pleasant 72 y o  female who presents with difficulty opening her mouth that has been progressively worsening for a long time  She has been recently going through a fibromyalgia flare, which has caused her to be restricted in movement throughout her body and recently had to travel a lot to help her aging mother  Thus, she is fatigued and also frustrated that she needs to get dental implants but cannot open her mouth fully to get them  The patient's greatest concerns are that she will be stuck this way and will no longer be able to eat normal foods  As she reports she and her dentist have been discussion options for sedation management during dental procedures, no additional referrals are necessary at this time  Primary movement impairment diagnosis of TMJ hypomobility resulting in pathoanatomical symptoms of Craniofacial pain syndrome, Craniofacial pain, Fibromyalgia and limiting her ability to chew, eat and yawn  Impairments include:  1) TMJ hypomobility - addressing with neuromotor retraining and with mobs and mobility exercises   2) poor cervicothoracic movement coordination - addressing with functional retraining  Impairments: abnormal coordination, abnormal muscle firing, abnormal muscle tone, abnormal or restricted ROM, abnormal movement, activity intolerance, difficulty understanding, lacks appropriate home exercise program and pain with function  Understanding of Dx/Px/POC: good   Prognosis: good  Prognosis details: Positive prognostic indicators include positive attitude toward recovery    Negative prognostic indicators include chronicity of symptoms, anxiety, high symptom irritability, central sensitization, fibromyalgia and multiple concurrent orthopedic problems  Goals  Patient will be independent with home exercise program    Patient will be able to manage symptoms independently  Patient will be able to open wide enough to get her dental implants  Patient will be able to yawn without limitation due to pain  Plan  Plan details: Prognosis above is given PT services 14 visits over the next 2 months and home program adherence  Patient would benefit from: skilled physical therapy  Planned therapy interventions: activity modification, joint mobilization, manual therapy, motor coordination training, neuromuscular re-education, patient education, self care, therapeutic activities, therapeutic exercise, home exercise program, behavior modification, postural training and functional ROM exercises  Treatment plan discussed with: patient        Subjective Evaluation    History of Present Illness  Mechanism of injury: Insidious, chronic  Quality of life: good    Pain  At worst pain rating: 10    Patient Goals  Patient goals for therapy: decreased pain, increased motion, independence with ADLs/IADLs and return to sport/leisure activities          Objective     Static Posture     Head  Forward  Shoulders  Rounded  Postural Observations  Seated posture: poor  Standing posture: fair        Palpation   Left   Hypertonic in the scalenes, sternocleidomastoid, suboccipitals, upper trapezius, masseter, temporalis, lateral pterygoid and medial pterygoid  Tenderness of the scalenes, sternocleidomastoid, suboccipitals, upper trapezius, masseter, temporalis, lateral pterygoid and medial pterygoid  Right   Hypertonic in the scalenes, sternocleidomastoid, suboccipitals, upper trapezius, masseter, temporalis, lateral pterygoid and medial pterygoid     Tenderness of the scalenes, sternocleidomastoid, suboccipitals, upper trapezius, masseter, temporalis, lateral pterygoid and medial pterygoid  Neurological Testing     Sensation   Cervical/Thoracic   Left   Intact: light touch    Right   Intact: light touch    Reflexes   Left   Biceps (C5/C6): normal (2+)  Mcconnell's reflex: negative    Right   Biceps (C5/C6): normal (2+)  Mcconnell's reflex: negative    Active Range of Motion   Cervical/Thoracic Spine       Cervical    Flexion:  with pain Restriction level: maximal  Extension:  with pain Restriction level: maximal  Left lateral flexion:  with pain Restriction level: maximal  Right lateral flexion:  with pain Restriction level maximal  Left rotation:  with pain Restriction level: maximal  Right rotation:  with pain Restriction level: maximal  Left Shoulder   External rotation BTH: C2 with pain  Internal rotation BTB: mid thoracic with pain    Right Shoulder   External rotation BTH: C4 with pain  Internal rotation BTB: sacrum with pain    Left Elbow   Normal active range of motion    Right Elbow   Normal active range of motion    Left Wrist   Normal active range of motion    Right Wrist   Normal active range of motion    Passive Range of Motion   Left Shoulder   Flexion: 90 degrees   Abduction: 90 degrees   External rotation 45°: 40 degrees   Internal rotation 45°: 40 degrees     Right Shoulder   Flexion: 60 degrees   Abduction: 80 degrees   External rotation 45°: 80 degrees   Internal rotation 45°: 40 degrees     Joint Play     Hypomobile: C1, C2, C3, C4, C5, C6, C7 and T1     Comments: Empty end feel throughout    Strength/Myotome Testing     Additional Strength Details  Grossly 3+/5 bilaterally    Tests   Cervical   Positive lumbar distraction test and craniocervical flexion test     Left   Negative Spurling's Test A  Right   Negative Spurling's Test A       Ambulation   Weight-Bearing Status   Weight-Bearing Status (Left): full weight bearing   Weight-Bearing Status (Right): full weight-bearing      Observational Gait   Gait: within functional limits   TMJ   Jaw observations: facial symmetry within normal limits  Occlusion class: class I (normal)  Patient does not have a  cleft palate  Scalloping of tongue: yes  Cusp wear: yes  Jaw trauma: no  Prognathia: no  Retrognathia: no  Mursicatio buccarum: yes  Lateral bite test, Left: no pain  Lateral bite test, right: no pain  ROM: pain with movement  Opening (mm): 15   Lateral excursion, left (mm): 2 and pain  Lateral excursion, right (mm)t: 3 and pain   ROM comments: Measurements fluctuate considerably between repetitions             Precautions: High symptom irritability, central sensitization and anxiety    Date: 9/19       Manual        CS/TrP release (trapezius, levator scapula, scalenes, SCM, medial pterygoid) SK       SOR SK                               Active/  Assistive Interventions        TMJ relaxed position 10       Progressive contract-relax SK       Yawn blocking SK                                                       Modalities

## 2019-09-19 NOTE — LETTER
2019    [place] Sil[place] [address]    Patient: Seven May   YOB: 1945    Date of Visit: 2019       Dear Dr Lm Mills: Thank you for your recent referral of Seven May  Please review the attached evaluation summary from [address] recent visit  Please verify that you agree with the plan of care by signing the attached document and sending it back to our office  If you have any questions or concerns, please don't hesitate to call  I sincerely appreciate the opportunity to share in the care of one of your patients and hope to have another opportunity to work with you in the near future  Sincerely,    Susi Juárez, PT, DPT, PhD       Referring Provider:      I certify that I have read the below Plan of Care and certify the need for these services furnished under this plan of treatment while under my care  Kathya Weber DO  [address] [address] [place]: [phone]           PT Evaluation     Today's date: 2019  Patient name: Seven May  : 1945  MRN: [id number]  Referring provider: Julio Cesar Jacobs DO  Dx:   Encounter Diagnosis     ICD-10-CM    1  Craniofacial pain syndrome G51 8    2  Craniofacial pain R51                   Assessment  Assessment details: Seven May is a pleasant 72 y o  female who presents with difficulty opening her mouth that has been progressively worsening for a long time  She has been recently going through a fibromyalgia flare, which has caused her to be restricted in movement throughout her body and recently had to travel a lot to help her aging mother  Thus, she is fatigued and also frustrated that she needs to get dental implants but cannot open her mouth fully to get them  The patient's greatest concerns are that she will be stuck this way and will no longer be able to eat normal foods          As she reports she and her dentist have been discussion options for sedation management during dental procedures, no additional referrals are necessary at this time  Primary movement impairment diagnosis of TMJ hypomobility resulting in pathoanatomical symptoms of Craniofacial pain syndrome, Craniofacial pain, Fibromyalgia and limiting her ability to chew, eat and yawn  Impairments include:  1) TMJ hypomobility - addressing with neuromotor retraining and with mobs and mobility exercises   2) poor cervicothoracic movement coordination - addressing with functional retraining  Impairments: abnormal coordination, abnormal muscle firing, abnormal muscle tone, abnormal or restricted ROM, abnormal movement, activity intolerance, difficulty understanding, lacks appropriate home exercise program and pain with function  Understanding of Dx/Px/POC: good   Prognosis: good  Prognosis details: Positive prognostic indicators include positive attitude toward recovery  Negative prognostic indicators include chronicity of symptoms, anxiety, high symptom irritability, central sensitization, fibromyalgia and multiple concurrent orthopedic problems  Goals  Patient will be independent with home exercise program    Patient will be able to manage symptoms independently  Patient will be able to open wide enough to get her dental implants  Patient will be able to yawn without limitation due to pain  Plan  Plan details: Prognosis above is given PT services 14 visits over the next 2 months and home program adherence    Patient would benefit from: skilled physical therapy  Planned therapy interventions: activity modification, joint mobilization, manual therapy, motor coordination training, neuromuscular re-education, patient education, self care, therapeutic activities, therapeutic exercise, home exercise program, behavior modification, postural training and functional ROM exercises  Treatment plan discussed with: patient        Subjective Evaluation    History of Present Illness  Mechanism of injury: Insidious, chronic  Quality of life: good    Pain  At worst pain rating: 10    Patient Goals  Patient goals for therapy: decreased pain, increased motion, independence with ADLs/IADLs and return to sport/leisure activities          Objective     Static Posture     Head  Forward  Shoulders  Rounded  Postural Observations  Seated posture: poor  Standing posture: fair        Palpation   Left   Hypertonic in the scalenes, sternocleidomastoid, suboccipitals, upper trapezius, masseter, temporalis, lateral pterygoid and medial pterygoid  Tenderness of the scalenes, sternocleidomastoid, suboccipitals, upper trapezius, masseter, temporalis, lateral pterygoid and medial pterygoid  Right   Hypertonic in the scalenes, sternocleidomastoid, suboccipitals, upper trapezius, masseter, temporalis, lateral pterygoid and medial pterygoid  Tenderness of the scalenes, sternocleidomastoid, suboccipitals, upper trapezius, masseter, temporalis, lateral pterygoid and medial pterygoid       Neurological Testing     Sensation   Cervical/Thoracic   Left   Intact: light touch    Right   Intact: light touch    Reflexes   Left   Biceps (C5/C6): normal (2+)  Mcconnell's reflex: negative    Right   Biceps (C5/C6): normal (2+)  Mcconnell's reflex: negative    Active Range of Motion   Cervical/Thoracic Spine       Cervical    Flexion:  with pain Restriction level: maximal  Extension:  with pain Restriction level: maximal  Left lateral flexion:  with pain Restriction level: maximal  Right lateral flexion:  with pain Restriction level maximal  Left rotation:  with pain Restriction level: maximal  Right rotation:  with pain Restriction level: maximal  Left Shoulder   External rotation BTH: C2 with pain  Internal rotation BTB: mid thoracic with pain    Right Shoulder   External rotation BTH: C4 with pain  Internal rotation BTB: sacrum with pain    Left Elbow   Normal active range of motion    Right Elbow   Normal active range of motion    Left Wrist   Normal active range of motion    Right Wrist   Normal active range of motion    Passive Range of Motion   Left Shoulder   Flexion: 90 degrees   Abduction: 90 degrees   External rotation 45°: 40 degrees   Internal rotation 45°: 40 degrees     Right Shoulder   Flexion: 60 degrees   Abduction: 80 degrees   External rotation 45°: 80 degrees   Internal rotation 45°: 40 degrees     Joint Play     Hypomobile: C1, C2, C3, C4, C5, C6, C7 and T1     Comments: Empty end feel throughout    Strength/Myotome Testing     Additional Strength Details  Grossly 3+/5 bilaterally    Tests   Cervical   Positive lumbar distraction test and craniocervical flexion test     Left   Negative Spurling's Test A  Right   Negative Spurling's Test A       Ambulation   Weight-Bearing Status   Weight-Bearing Status (Left): full weight bearing   Weight-Bearing Status (Right): full weight-bearing      Observational Gait   Gait: within functional limits   TMJ   Jaw observations: facial symmetry within normal limits  Occlusion class: class I (normal)  Patient does not have a  cleft palate  Scalloping of tongue: yes  Cusp wear: yes  Jaw trauma: no  Prognathia: no  Retrognathia: no  Mursicatio buccarum: yes  Lateral bite test, Left: no pain  Lateral bite test, right: no pain  ROM: pain with movement  Opening (mm): 15   Lateral excursion, left (mm): 2 and pain  Lateral excursion, right (mm)t: 3 and pain   ROM comments: Measurements fluctuate considerably between repetitions             Precautions: High symptom irritability, central sensitization and anxiety    Date: 9/19       Manual        CS/TrP release (trapezius, levator scapula, scalenes, SCM, medial pterygoid) SK       SOR SK                               Active/  Assistive Interventions        TMJ relaxed position 10       Progressive contract-relax SK       Yawrebecca blocking SK Modalities

## 2019-09-23 ENCOUNTER — OFFICE VISIT (OUTPATIENT)
Dept: PHYSICAL THERAPY | Facility: MEDICAL CENTER | Age: 74
End: 2019-09-23
Payer: MEDICARE

## 2019-09-23 DIAGNOSIS — M79.7 FIBROMYALGIA: ICD-10-CM

## 2019-09-23 DIAGNOSIS — G51.8 CRANIOFACIAL PAIN SYNDROME: Primary | ICD-10-CM

## 2019-09-23 DIAGNOSIS — R51.9 CRANIOFACIAL PAIN: ICD-10-CM

## 2019-09-23 PROCEDURE — 97112 NEUROMUSCULAR REEDUCATION: CPT | Performed by: PHYSICAL THERAPIST

## 2019-09-23 PROCEDURE — 97140 MANUAL THERAPY 1/> REGIONS: CPT | Performed by: PHYSICAL THERAPIST

## 2019-09-23 NOTE — PROGRESS NOTES
Daily Note     Today's date: 2019  Patient name: Mary Alcantara  : 1945  MRN: [id number]  Referring provider: Anel Alcaraz DO  Dx:   Encounter Diagnosis     ICD-10-CM    1  Craniofacial pain syndrome G51 8    2  Craniofacial pain R51    3  Fibromyalgia M79 7                   Assessment:   1) TMJ hypomobility - addressing with neuromotor retraining and with mobs and mobility exercises   2) poor cervicothoracic movement coordination - addressing with functional retraining      Goals  Patient will be independent with home exercise program  - in progress  Patient will be able to manage symptoms independently  - in progress  Patient will be able to open wide enough to get her dental implants - in progress  Patient will be able to yawn without limitation due to pain  - in progress  Patient will be able to turn to look over a shoulder with only moderate pain - in progress    Plan: Continue per plan of care  2x/wk  However, patient will be out of town this week and has a [employer] holiday next week and thus will not be able to return for 1 5 wks  Subjective: Patient reports she continues to feel a lot of tightness and almost as if her jaw "slips out" intermittently        Objective: See treatment diary below  Cervical Spine:   Flexion:  with pain Restriction level: maximal  Extension:  with pain Restriction level: maximal  Left lateral flexion:  with pain Restriction level: maximal  Right lateral flexion:  with pain Restriction level maximal  Left rotation:  with pain Restriction level: maximal  Right rotation:  with pain Restriction level: moderate    TMJ:  Opening (mm): 15   Lateral excursion, left (mm): 2 and pain  Lateral excursion, right (mm)t: 3 and pain       Precautions: High symptom irritability, central sensitization and anxiety    Date:       Manual        CS/TrP release (trapezius, levator scapula, scalenes, SCM, medial pterygoid) SK SK      SOR SK SK Active/  Assistive Interventions        TMJ relaxed position 10 10      Progressive contract-relax SK review      Yawn blocking SK review      Controlled opening  10                                              Modalities                        Access Code: [email or web address]   URL: [email or web address]

## 2019-10-03 ENCOUNTER — APPOINTMENT (OUTPATIENT)
Dept: PHYSICAL THERAPY | Facility: MEDICAL CENTER | Age: 74
End: 2019-10-03
Payer: MEDICARE

## 2019-10-03 LAB — HBA1C MFR BLD HPLC: 5.4 %

## 2019-10-07 ENCOUNTER — OFFICE VISIT (OUTPATIENT)
Dept: PHYSICAL THERAPY | Facility: MEDICAL CENTER | Age: 74
End: 2019-10-07
Payer: MEDICARE

## 2019-10-07 DIAGNOSIS — R51.9 CRANIOFACIAL PAIN: ICD-10-CM

## 2019-10-07 DIAGNOSIS — M79.7 FIBROMYALGIA: ICD-10-CM

## 2019-10-07 DIAGNOSIS — G51.8 CRANIOFACIAL PAIN SYNDROME: Primary | ICD-10-CM

## 2019-10-07 PROCEDURE — 97140 MANUAL THERAPY 1/> REGIONS: CPT | Performed by: PHYSICAL THERAPIST

## 2019-10-07 PROCEDURE — 97112 NEUROMUSCULAR REEDUCATION: CPT | Performed by: PHYSICAL THERAPIST

## 2019-10-07 NOTE — PROGRESS NOTES
Daily Note     Today's date: 10/7/2019  Patient name: Drea Severino  : 1945  MRN: [id number]  Referring provider: Yumiko Greene DO  Dx:   Encounter Diagnosis     ICD-10-CM    1  Craniofacial pain syndrome G51 8    2  Craniofacial pain R51    3  Fibromyalgia M79 7                   Assessment:   1) TMJ hypomobility - addressing with neuromotor retraining and with mobs and mobility exercises   2) poor cervicothoracic movement coordination - addressing with functional retraining      Goals  Patient will be independent with home exercise program  - in progress  Patient will be able to manage symptoms independently  - in progress  Patient will be able to open wide enough to get her dental implants - in progress  Patient will be able to yawn without limitation due to pain  - in progress  Patient will be able to turn to look over a shoulder with only moderate pain - in progress    Plan: 1 more visit in 3 weeks, then transition to HEP only  Subjective: Patient reports she had a temporary implant placed last Wednesday followed by a cleaning which caused significant set-backs in pain and irritation throughout her body  Thus, she reports she would like to have 1 more visit in 3 weeks and then discontinue the plan of care at that time and reconsider the plan of care once she is done with her dental work        Objective: See treatment diary below  Cervical Spine:   Flexion:  with pain Restriction level: maximal  Extension:  with pain Restriction level: maximal  Left lateral flexion:  with pain Restriction level: maximal  Right lateral flexion:  with pain Restriction level maximal  Left rotation:  with pain Restriction level: maximal  Right rotation:  with pain Restriction level: moderate    TMJ:  Opening (mm): 22   Lateral excursion, left (mm): 3 and pain  Lateral excursion, right (mm): 1 and pain       Precautions: High symptom irritability, central sensitization and anxiety    Date: 9/19 9/23 10/7 Manual        CS/TrP release (trapezius, levator scapula, scalenes, SCM, medial pterygoid) SK SK SK     SOR SK SK SK                             Active/  Assistive Interventions        TMJ relaxed position 10 10 review     Progressive contract-relax SK review review     Yawn blocking SK review review     Controlled opening  10 review                                             Modalities                        Access Code: [email or web address]   URL: [email or web address]

## 2019-10-10 ENCOUNTER — APPOINTMENT (OUTPATIENT)
Dept: PHYSICAL THERAPY | Facility: MEDICAL CENTER | Age: 74
End: 2019-10-10
Payer: MEDICARE

## 2019-10-14 ENCOUNTER — APPOINTMENT (OUTPATIENT)
Dept: PHYSICAL THERAPY | Facility: MEDICAL CENTER | Age: 74
End: 2019-10-14
Payer: MEDICARE

## 2019-10-14 ENCOUNTER — TELEPHONE (OUTPATIENT)
Dept: RADIOLOGY | Facility: HOSPITAL | Age: 74
End: 2019-10-14

## 2019-10-14 RX ORDER — SODIUM CHLORIDE 9 MG/ML
75 INJECTION, SOLUTION INTRAVENOUS CONTINUOUS
Status: CANCELLED | OUTPATIENT
Start: 2019-10-14

## 2019-10-17 ENCOUNTER — APPOINTMENT (OUTPATIENT)
Dept: PHYSICAL THERAPY | Facility: MEDICAL CENTER | Age: 74
End: 2019-10-17
Payer: MEDICARE

## 2019-10-18 ENCOUNTER — ANESTHESIA (OUTPATIENT)
Dept: RADIOLOGY | Facility: HOSPITAL | Age: 74
End: 2019-10-18
Payer: MEDICARE

## 2019-10-18 ENCOUNTER — ANESTHESIA EVENT (OUTPATIENT)
Dept: RADIOLOGY | Facility: HOSPITAL | Age: 74
End: 2019-10-18
Payer: MEDICARE

## 2019-10-18 ENCOUNTER — HOSPITAL ENCOUNTER (OUTPATIENT)
Dept: RADIOLOGY | Facility: HOSPITAL | Age: 74
Discharge: HOME | End: 2019-10-18
Attending: NEUROLOGICAL SURGERY | Admitting: NEUROLOGICAL SURGERY
Payer: MEDICARE

## 2019-10-18 VITALS
BODY MASS INDEX: 22.66 KG/M2 | HEART RATE: 75 BPM | RESPIRATION RATE: 18 BRPM | SYSTOLIC BLOOD PRESSURE: 113 MMHG | HEIGHT: 61 IN | WEIGHT: 120 LBS | OXYGEN SATURATION: 99 % | DIASTOLIC BLOOD PRESSURE: 60 MMHG

## 2019-10-18 DIAGNOSIS — I65.22 STENOSIS OF LEFT INTERNAL CAROTID ARTERY: ICD-10-CM

## 2019-10-18 DIAGNOSIS — H93.A3 PULSATILE TINNITUS OF BOTH EARS: ICD-10-CM

## 2019-10-18 DIAGNOSIS — I65.22 STENOSIS OF CAVERNOUS PORTION OF LEFT INTERNAL CAROTID ARTERY: ICD-10-CM

## 2019-10-18 PROCEDURE — C1769 GUIDE WIRE: HCPCS

## 2019-10-18 PROCEDURE — 36227 PLACE CATH XTRNL CAROTID: CPT

## 2019-10-18 PROCEDURE — 36224 PLACE CATH CAROTD ART: CPT

## 2019-10-18 PROCEDURE — C1894 INTRO/SHEATH, NON-LASER: HCPCS

## 2019-10-18 PROCEDURE — C1760 CLOSURE DEV, VASC: HCPCS

## 2019-10-18 PROCEDURE — 36224 PLACE CATH CAROTD ART: CPT | Performed by: NEUROLOGICAL SURGERY

## 2019-10-18 PROCEDURE — 36227 PLACE CATH XTRNL CAROTID: CPT | Performed by: NEUROLOGICAL SURGERY

## 2019-10-18 PROCEDURE — NC001 PR NO CHARGE: Performed by: NEUROLOGICAL SURGERY

## 2019-10-18 PROCEDURE — 36226 PLACE CATH VERTEBRAL ART: CPT | Performed by: NEUROLOGICAL SURGERY

## 2019-10-18 PROCEDURE — 36226 PLACE CATH VERTEBRAL ART: CPT

## 2019-10-18 RX ORDER — SODIUM CHLORIDE 9 MG/ML
75 INJECTION, SOLUTION INTRAVENOUS CONTINUOUS
Status: DISCONTINUED | OUTPATIENT
Start: 2019-10-18 | End: 2019-10-18 | Stop reason: HOSPADM

## 2019-10-18 RX ORDER — SODIUM CHLORIDE 9 MG/ML
125 INJECTION, SOLUTION INTRAVENOUS CONTINUOUS
Status: DISCONTINUED | OUTPATIENT
Start: 2019-10-18 | End: 2019-10-18 | Stop reason: HOSPADM

## 2019-10-18 RX ORDER — PROPOFOL 10 MG/ML
INJECTION, EMULSION INTRAVENOUS AS NEEDED
Status: DISCONTINUED | OUTPATIENT
Start: 2019-10-18 | End: 2019-10-18 | Stop reason: SURG

## 2019-10-18 RX ORDER — FENTANYL CITRATE 50 UG/ML
INJECTION, SOLUTION INTRAMUSCULAR; INTRAVENOUS AS NEEDED
Status: DISCONTINUED | OUTPATIENT
Start: 2019-10-18 | End: 2019-10-18 | Stop reason: SURG

## 2019-10-18 RX ORDER — LIDOCAINE HYDROCHLORIDE 10 MG/ML
INJECTION, SOLUTION EPIDURAL; INFILTRATION; INTRACAUDAL; PERINEURAL AS NEEDED
Status: DISCONTINUED | OUTPATIENT
Start: 2019-10-18 | End: 2019-10-18 | Stop reason: SURG

## 2019-10-18 RX ORDER — ONDANSETRON 2 MG/ML
INJECTION INTRAMUSCULAR; INTRAVENOUS AS NEEDED
Status: DISCONTINUED | OUTPATIENT
Start: 2019-10-18 | End: 2019-10-18 | Stop reason: SURG

## 2019-10-18 RX ORDER — PHENAZOPYRIDINE HYDROCHLORIDE 100 MG/1
100 TABLET, FILM COATED ORAL
Status: DISCONTINUED | OUTPATIENT
Start: 2019-10-18 | End: 2019-10-18 | Stop reason: HOSPADM

## 2019-10-18 RX ORDER — HYDROCODONE BITARTRATE AND ACETAMINOPHEN 5; 325 MG/1; MG/1
1 TABLET ORAL EVERY 6 HOURS PRN
Status: DISCONTINUED | OUTPATIENT
Start: 2019-10-18 | End: 2019-10-18 | Stop reason: HOSPADM

## 2019-10-18 RX ADMIN — IODIXANOL 170 ML: 320 INJECTION, SOLUTION INTRAVASCULAR at 08:59

## 2019-10-18 RX ADMIN — PROPOFOL 50 MG: 10 INJECTION, EMULSION INTRAVENOUS at 08:42

## 2019-10-18 RX ADMIN — FENTANYL CITRATE 50 MCG: 50 INJECTION, SOLUTION INTRAMUSCULAR; INTRAVENOUS at 08:57

## 2019-10-18 RX ADMIN — PHENAZOPYRIDINE HYDROCHLORIDE 100 MG: 100 TABLET ORAL at 10:01

## 2019-10-18 RX ADMIN — PROPOFOL 80 MG: 10 INJECTION, EMULSION INTRAVENOUS at 08:09

## 2019-10-18 RX ADMIN — SODIUM CHLORIDE 75 ML/HR: 0.9 INJECTION, SOLUTION INTRAVENOUS at 06:53

## 2019-10-18 RX ADMIN — LIDOCAINE HYDROCHLORIDE 50 MG: 10 INJECTION, SOLUTION EPIDURAL; INFILTRATION; INTRACAUDAL; PERINEURAL at 08:09

## 2019-10-18 RX ADMIN — ONDANSETRON 4 MG: 2 INJECTION INTRAMUSCULAR; INTRAVENOUS at 08:57

## 2019-10-18 NOTE — OP NOTE
OPERATIVE REPORT  PATIENT NAME: Lindsey Sood     :  1945  MRN: [id number]   Pt Location: Interventional radiology    SURGERY DATE: 10/18/19     Preop Diagnosis:  1  Pulsatile tinnitus  2  Left cavernous carotid stenosis    Postop Diagnosis  1  Pulsatile tinnitus  2  Left cavernous carotid stenosis    Procedure:  Right Common Carotid Arteriogram  Right Internal Carotid Arteriogram  Right External Carotid Arteriogram  Left Common Carotid Arteriogram  Left Internal Carotid Arteriogram  Left External Carotid Arteriogram  Left Vertebral Artery Arteriogram  Limited Right Femoral Arteriogram    Surgeon:   Connie Terry MD    Specimen(s):  None    Estimated Blood Loss:   None    Drains:  None    Anesthesia Type:   Monitored Anesthesia Care     Complications:  None    Operative Indications:  Lindsey Sood  is a very pleasant 72 y o  female who presented to clinic pulsatile tinnitus incidental left cavernous carotid stenosis     After discussing the risks and benefits of a diagnostic cerebral arteriogram including bleeding, stroke, groin hematoma, and death the patient elected to proceed to evaluate for possible vascular causes of her pulsatile tinnitus  Procedure Details:  After obtaining written informed consent, the patient was brought into the operating room and moved to the OR table in supine fashion  The groin was prepped and draped in the usual sterile fashion  Monitored anesthesia care was induced  10 cc of intradermal lidocaine was infused into the right femoral area and percutaneous access with a 5-Citizen of [place] micropuncture kit was obtained into the right femoral artery  A 5-Citizen [place] introducer sheath was placed and a 5-[place]izen [place] angled glide catheter was then advanced into the aorta, and over the aortic arch over a Glidewire  The right common carotid artery was then catheterized  AP lateral and oblique images of the right cervical carotid were obtained       The catheter was then advanced and the right internal carotid artery was then catheterized  AP lateral and magnified oblique images of the right intracranial carotid circulation were obtained  And the right external carotid artery was catheterized  AP and lateral images were then obtained of extracranial circulation  The catheter was then withdrawn into the aortic arch and advanced into the left common carotid artery  AP lateral and oblique images of the left cervical carotid were obtained  The catheter was then advanced and the left internal carotid artery was then catheterized  AP lateral and magnified oblique images of the left intracranial carotid circulation were obtained  The left external carotid artery was catheterized  AP and lateral images were then obtained of extracranial circulation  The catheter was then withdrawn into the aortic arch and advanced into the left vertebral artery  Transfascial lateral and oblique images of the left vertebral artery intracranial circulation were obtained  The catheter was then withdrawn from the body and a limited right femoral arteriogram was run  Puncture site was found to be compatible with a Mynx Closure device and this was done successfully  There was appropriate hemostasis  The patient was then awoken from monitored anesthesia care and found to be in baseline neurologic condition  All sponge and needle counts were correct  INTERPRETATION OF ANGIOGRAPHIC FINDINGS:   1  The Right common carotid circulation reveals antegrade flow into the internal and external carotid arteries  There is no hemodynamically significant carotid stenosis as defined by NASCET criteria  2  The Right internal carotid artery circulation reveals antegrade flow into the middle cerebral and anterior cerebral arteries  There is a patent anterior communicating artery  There is no evidence of aneurysm, AVM, or vascular malformation  The capillary and venous phases are unremarkable       3  The right external carotid artery has antegrade flow into the extracranial vessels  There is no evidence of intracranial fistulous connection  4  The Left common carotid circulation reveals antegrade flow into the internal and external carotid arteries  There is no hemodynamically significant carotid stenosis as defined by NASCET criteria  5  The Left internal carotid artery circulation reveals antegrade flow into the middle cerebral and anterior cerebral arteries  There is a patent anterior communicating artery  There is no evidence of aneurysm, AVM, or vascular malformation  There is a short segment focal stenosis of cavernous segment of approximately 50%  There is no flow limitation  The capillary and venous phases are unremarkable  6  The left external carotid artery has antegrade flow into the extracranial vessels  There is no evidence of intracranial fistulous connection  7  The Left vertebral intracranial circulation reveals retrograde reflux down the contralateral vertebral artery  Both PICAs are well visualized  Antegrade flow is present into all the posterior circulation branches  There is reflux into the left MCA and ALANA territory  There are no AVMs or aneurysms  The capillary and venous phases are unremarkable  8  Limited Right femoral arteriogram reveals normal puncture site anatomy  Impression:  No evidence of dural AV fistula  Short-segment approximately 50% left cavernous carotid segment stenosis    No evidence of flow limitation    Patient Disposition:  PACU     SIGNATURE: Andrés Vigil MD  DATE: 10/18/19   TIME: 12:22 PM

## 2019-10-18 NOTE — DISCHARGE INSTRUCTIONS
Today, you underwent a diagnostic cerebral angiogram under the care of Dr Sonya Blanco for evaluation of Pulsatile Tinnitits  ? The following instructions will help you care for yourself, or be cared for upon your return home today  These are guidelines for your care right after your surgery only  ? Notify Your Doctor or Nurse if you have any of the following:  ? SYMPTOMS OF WOUND INFECTION--   Increased pain in or around the incision   Swelling around the incision  Any drainage from the incision  Incision separates or opens up  Warmth in the tissues around the incision  Redness or tenderness on the skin near the incision   Fever (temperature greater than 101 degrees F)   ? NEUROLOGICAL CHANGES--  Change in alertness  Increased sleepiness   Nausea and vomiting   New onset of numbness or weakness in arms or legs   New problems with your bowels or bladder  New or worse problems with balance or walking  Seizures, new or worsening  ? UNRELIEVED HEADACHE PAIN--  New or increased pain unrelieved with pain medications   Pain associated with nausea and vomiting   Pain associated with other symptoms  ? QUESTIONS OR PROBLEMS--  Any questions or problems that you are unsure about  Wound Care:  Keep Incision Clean and Dry   You may shower daily, but do not soak incision  Pat dry after showering  No tub baths, soaking, swimming for 1 week after angiogram    You do not need to cover the incision  Mild to moderate bruising and tenderness to the site is expected and may last up to 1-2 weeks after your procedure  ?  A closure device was placed at the catheter insertion site  This is MRI compatible  Remove the dressing 24 hours after your procedure  If your groin site is bleeding, apply firm pressure for 10 minutes  Reinforce dressing rather than removing and checking frequently  If continues to bleed through the dressing after 1 hour, contact your neurosurgeon's office  Anticipatory Education:  ?   PAIN MED W/ Acetaminophen (Tylenol)  --IF a prescription for pain medicine has been sent home with you:  --Narcotic pain medication may cause constipation  Be sure to take stool softeners or laxatives while you are on narcotic pain medication  --Do not drive after taking prescription pain medicine  ?  If this medicine is too strong, or no longer necessary, or we did NOT recommend/prescribe oral narcotics, you may take:   - Tylenol Extra-strength/Acetaminophen, 2 tablets every 4-6 hours as needed for mild pain  DO NOT TAKE MORE THAN 4000MG PER DAY from combined sources  NOTE: Remember to eat when taking pain medicines in order to avoid nausea  Watch for constipation  Eat plenty of fruits, vegetables, juices, and drink 6-8 glasses of water each day  Constipation: Stay active and drink at least 6-8 cups of fluid each day to prevent constipation  If you need a laxative or stool softener follow the package directions or consult with your local pharmacists if you have questions  ? After anesthesia, rest for 24 hours  Do not drive, drink alcohol beverages or make any important decisions during this time  General anesthesia may cause sore throat, jaw discomfort or muscle aches  These symptoms can last for one or two days  Activity: Please follow these instructions:  Advance your activity as you can tolerate  You may do light house work; nothing strenuous   You may walk all you want  You may go up and down the steps  Use the railing for support  Do not do excessive bending, straining or heavy lifting for 48 hours after your procedure  Do not drive or return to work until you are instructed   It is normal for your energy level and sleep patterns to change after surgery  Get extra sleep at night and take naps during the day to help you feel less tired  Take rest periods during the day  Complete recovery may take several weeks  ?  You may resume driving after 17-95 hours recovery     You may return to work after 48 hours of recovery  ?  Diet:  Your doctor has recommended that you follow these diet instructions at home  Refer to the patient education materials you received during your hospital stay  If you would like more nutrition counseling, ask your doctor about making an appointment with an outpatient dietitian  Resume your home diet  ? Medications:  Please resume your home medications as instructed  ? Home Supplies and Equipment:  none  Additional Contacts:  ? CONTACTS FOR NEUROSURGERY: You may call your neurosurgeons office if you have questions between 8:30 am and 4:30 pm  You may request to speak to the nurse practitioner who is available Monday through Friday  ?  For off hours or the weekend you may call your neurosurgeon's office to leave a message

## 2019-10-18 NOTE — H&P
Patient seen and examined independently  Clinic note from 9/11 remains current  Patient is not on any new medications and has not had any new medical problems  /56 (BP Location: Right arm)   Pulse 78   Resp 18   Ht 5' 1" (1 549 m)   Wt 54 4 kg (120 lb)   SpO2 99%   BMI 22 67 kg/m²  On exam, patient is neurologically intact  Heart rate is regular  Breath sounds are clear  Plan to proceed with diagnostic cerebral arteriogram to better delineate left cavernous stenosis and pulsatile tinnitus

## 2019-10-18 NOTE — ANESTHESIA POSTPROCEDURE EVALUATION
Post-Op Assessment Note    CV Status:  Stable  Pain Score: 0    Pain management: adequate     Mental Status:  Alert   Hydration Status:  Stable and euvolemic   PONV Controlled:  None   Airway Patency:  Patent   Post Op Vitals Reviewed:  Yes              BP   97/50   Temp      Pulse  68   Resp   18   SpO2   99

## 2019-10-18 NOTE — ANESTHESIA PREPROCEDURE EVALUATION
Review of Systems/Medical History  Patient summary reviewed  Chart reviewed  History of anesthetic complications PONV    Cardiovascular  Hyperlipidemia,    Pulmonary       GI/Hepatic    GERD ,        Kidney stones,   Comment: interstitial cystitis       Endo/Other  History of thyroid disease , hypothyroidism,      GYN       Hematology   Musculoskeletal    Arthritis     Neurology    Fibromyalgia  Comment: myofascial pain syndrome Psychology           Physical Exam    Airway  Comment: Small mouth opening  Mallampati score: II  TM Distance: >3 FB  Neck ROM: limited     Dental   Comment: Pt currently in the process of having dental implants placed, had a temporary in on the bottom right that fell out,     Cardiovascular  Cardiovascular exam normal    Pulmonary  Pulmonary exam normal     Other Findings        Anesthesia Plan  ASA Score- 3     Anesthesia Type- IV sedation with anesthesia with ASA Monitors  Additional Monitors:   Airway Plan:         Plan Factors-    Induction- intravenous  Postoperative Plan-     Informed Consent- Anesthetic plan and risks discussed with patient  I personally reviewed this patient with the CRNA  Discussed and agreed on the Anesthesia Plan with the CRNA  Loli Sapp

## 2019-10-21 ENCOUNTER — APPOINTMENT (OUTPATIENT)
Dept: PHYSICAL THERAPY | Facility: MEDICAL CENTER | Age: 74
End: 2019-10-21
Payer: MEDICARE

## 2019-10-22 ENCOUNTER — TELEPHONE (OUTPATIENT)
Dept: NEUROSURGERY | Facility: CLINIC | Age: 74
End: 2019-10-22

## 2019-10-22 NOTE — TELEPHONE ENCOUNTER
Completed post angio [facility] d/c date of 10/18/2019 to Bhupinder Srivastava at preferred contact number  The patient denies any pain, swelling, drainage, fevers, or numbness/tingling/weakness in her leg  Reports a mild headache but this is chronic in nature and is at its baseline  Some tenderness in her groin, denies s/s of infection  Advised that it is normal to experience fatigue and mild headaches for a few days after the procedure  Explained to contact the office if she should experience any pain, swelling, or drainage from the site, and report to ER or call 911 if she experiences [facility]LE, disorientation, visual disturbance  Reminded of post procedure follow up scheduled with Dr Radha Flores 11/6/2019  Patient appreciative of call

## 2019-10-23 ENCOUNTER — CONSULT (OUTPATIENT)
Dept: NEUROLOGY | Facility: CLINIC | Age: 74
End: 2019-10-23
Payer: MEDICARE

## 2019-10-23 VITALS
HEIGHT: 61 IN | SYSTOLIC BLOOD PRESSURE: 120 MMHG | DIASTOLIC BLOOD PRESSURE: 60 MMHG | HEART RATE: 70 BPM | WEIGHT: 120.8 LBS | BODY MASS INDEX: 22.81 KG/M2

## 2019-10-23 DIAGNOSIS — I65.22 STENOSIS OF CAVERNOUS PORTION OF LEFT INTERNAL CAROTID ARTERY: ICD-10-CM

## 2019-10-23 DIAGNOSIS — H93.A3 PULSATILE TINNITUS OF BOTH EARS: ICD-10-CM

## 2019-10-23 DIAGNOSIS — I65.22 STENOSIS OF LEFT INTERNAL CAROTID ARTERY: ICD-10-CM

## 2019-10-23 DIAGNOSIS — R06.83 SNORING: Primary | ICD-10-CM

## 2019-10-23 PROCEDURE — 99205 OFFICE O/P NEW HI 60 MIN: CPT | Performed by: PSYCHIATRY & NEUROLOGY

## 2019-10-23 RX ORDER — ASPIRIN 81 MG/1
81 TABLET ORAL DAILY
Qty: 30 TABLET | Refills: 3 | Status: SHIPPED | OUTPATIENT
Start: 2019-10-23

## 2019-10-23 NOTE — PROGRESS NOTES
Patient ID: Drea Severino is a 72 y o  female  Assessment/Plan: This is a 73 y/o Female who is here as a new patient for left cavernous segment ICA severe stenosis  Patient did not have any stroke like symptoms  She has hyperlipidemia and snores  She does have interstim bladder stimulator and cannot get MRI brain  I personally reviewed CTA head and neck  Since it is L cavernous segment ICA stenosis, no intervention can be done  PLAN       Problem List Items Addressed This Visit        Cardiovascular and Mediastinum    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     Likely not an intervention candidate   C/w aspirin and zetia for primary prevention  BP is at goal currently <130/80  -Does not smoke  She does snore and wants to get sleep study  -denies any history of snoring    -I advised patient to avoid using NSAIDs for headaches or other pain and instead to just stick to tylenol    -I educated regarding mediterranean style diet and regular exercise regimen   -I educated patient/family regarding medication compliance           Relevant Medications    aspirin (ECOTRIN LOW STRENGTH) 81 mg EC tablet    Stenosis of left internal carotid artery    Relevant Medications    aspirin (ECOTRIN LOW STRENGTH) 81 mg EC tablet       Nervous and Auditory    Pulsatile tinnitus of both ears       Other    Snoring - Primary    Relevant Orders    Diagnostic Sleep Study         I would like to follow up in 1 year  I would be happy to see the patient sooner if any new questions/concerns arise  Patient/Guardian was advised to the call the office if they have any questions and concerns in the meantime       Patient/Guardian does understand that if they have any new stroke like symptoms such as facial droop on one side, weakness/paralysis on either side, speech trouble, numbness on one side, balance issues, any vision changes, extreme dizziness or any new headache, to call 9-1-1 immediately or to proceed to the nearest ER immediately  Subjective:    HPI    This is a 73 y/o F who is here as a new patient to establish care with us today  Patient was seen by Neurosurgery for bilateral pulsatile tinnitus  Patient has a history of longstanding fibromyalgia and myofacial pain and has had tinnitus for decades  Unfortunately she started to experience hearing loss in her left ear as well as pulsatile tinnitus in both her ears since this may  She has been following up with ENT and has had an acute could stick neuroma that was ruled out  She had a noncontrast CT head which was negative  She then had a CTA which did show focal severe atherosclerotic stenosis within the distal left cavernous and proximal supraclinoid ICA junction  I personally reviewed the images  Patient then went out and had a cerebral angiogram which also showed the same  She was then sent here for L ICA stenosis  She cannot tolerate statins due to muscle aches, and fatigue  She is currently maintained on aspirin which was started this past Friday by neurosurgery  She cannot get MRI brain because she has a interstimulator for the bladder which is planning on removing it  She has not had any TIA/CVA like symptoms  NO issues with vision loss, numbness, balance issues, tingling, weakness       The following portions of the patient's history were reviewed and updated as appropriate:   She  has a past medical history of Allergy to adhesive tape, Arthritis, Back pain, Cataract, Fecal smearing, Fibromyalgia, primary, Frequent urination, Hypercholesteremia, Hyperlipidemia, Hypothyroid, Hypothyroidism, Interstitial cystitis, Interstitial cystitis, Irritable bowel syndrome, Kidney stone, Microscopic hematuria, Muscle spasm, Myofascial pain syndrome, Osteoarthritis, Osteoporosis, Other allergy, initial encounter, Pelvic floor dysfunction, Pelvic pain, PONV (postoperative nausea and vomiting), Rosacea, Seasonal allergies, Shingles, Sjogren's disease ([facility] [address] ), Skin cancer, Spondylisthesis, Urinary urgency, Wears dentures, and Wears glasses  She   Patient Active Problem List    Diagnosis Date Noted    Stenosis of left internal carotid artery 10/23/2019    Snoring 10/23/2019    Pulsatile tinnitus of both ears 09/11/2019    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 09/11/2019    Nausea 11/01/2018    Esophageal dysphagia 04/20/2018    Generalized abdominal pain 04/20/2018    Gastroesophageal reflux disease without esophagitis 04/20/2018    IC (interstitial cystitis) 08/04/2017    Urinary urgency 08/04/2017    Difficult or painful urination 08/04/2017    Fecal incontinence 05/24/2012    Increased frequency of urination 05/24/2012     She  has a past surgical history that includes Cataract extraction (Bilateral); Other surgical history (Left); Hysterectomy (1971); Other surgical history (Bilateral, 2012, 2014, 2017); Other surgical history; Tonsillectomy; Appendectomy; Colonoscopy; pr cystourethroscopy (N/A, 10/4/2017); Salpingoophorectomy (2002); Esophagogastroduodenoscopy (N/A, 5/25/2018); ADENOIDECTOMY; Septoplasty (1980); and IR cerebral angiography (10/18/2019)  Her family history includes Cancer in her brother and father  She  reports that she has never smoked  She has never used smokeless tobacco  She reports that she drinks alcohol  She reports that she does not use drugs    Current Outpatient Medications   Medication Sig Dispense Refill    diclofenac sodium (VOLTAREN) 1 % diclofenac 1 % topical gel   APPLY SPARINGLY TO AFFECTED AREA(S) ONCE DAILY prn      ezetimibe (ZETIA) 10 mg tablet ezetimibe 10 mg tablet   1qd      gabapentin (NEURONTIN) 300 mg capsule Take 400 mg by mouth 2 (two) times a day   0    gabapentin (NEURONTIN) 300 mg capsule Take 300 mg by mouth Three times a day      levothyroxine 50 mcg tablet Take 50 mcg by mouth daily      aspirin (ECOTRIN LOW STRENGTH) 81 mg EC tablet Take 1 tablet (81 mg total) by mouth daily 30 tablet 3    clonazePAM (KlonoPIN) 0 5 mg tablet Take 0 5 mg by mouth daily as needed       No current facility-administered medications for this visit  Current Outpatient Medications on File Prior to Visit   Medication Sig    diclofenac sodium (VOLTAREN) 1 % diclofenac 1 % topical gel   APPLY SPARINGLY TO AFFECTED AREA(S) ONCE DAILY prn    ezetimibe (ZETIA) 10 mg tablet ezetimibe 10 mg tablet   1qd    gabapentin (NEURONTIN) 300 mg capsule Take 400 mg by mouth 2 (two) times a day     gabapentin (NEURONTIN) 300 mg capsule Take 300 mg by mouth Three times a day    levothyroxine 50 mcg tablet Take 50 mcg by mouth daily    [DISCONTINUED] levothyroxine 50 mcg tablet Take 75 mcg by mouth daily in the early morning    clonazePAM (KlonoPIN) 0 5 mg tablet Take 0 5 mg by mouth daily as needed     No current facility-administered medications on file prior to visit  She is allergic to other; sulfa antibiotics; atorvastatin; blue dyes (parenteral); macrobid [nitrofurantoin]; medical tape; and sulfate            Objective:    Blood pressure 120/60, pulse 70, height 5' 1" (1 549 m), weight 54 8 kg (120 lb 12 8 oz), not currently breastfeeding  Physical Exam  General: Patient is not in any acute/apparent distress, well nourished, well developed and cooperative  HEENT: normocephalic, atraumatic, moist membranes  Neck: supple  Heart: regular heart rate and rhythm, no murmurs, rubs and or gallops  Chest: Clear to auscultation bilaterally  Abdomen: soft and non-tender  Extremities: no edema noted   Skin: no lesions or rash  Musculosketal: no bony abnormalities    Neurologic Examination:   Mental status: alert, awake, oriented X 3 and following commands       Speech/Language: Speech is fluent without any dysarthria, no aphasia noted, can name, repeat, read and write and comprehension intact    Cranial Nerves:   CN I: smell not tested  CN II: Visual fields full to confrontation, fundus - no papilledema noted   CN III, IV, VI: Extraocular movements intact bilaterally  Pupils equal round and reactive to light bilaterally  CN V: Facial sensation is normal   CN VII: Full and symmetric facial movement  CN VIII: Hearing is normal   CN IX, X: Palate elevates symmetrically  Normal gag reflex  CN XI: Shoulder shrug strength is normal   CN XII: Tongue midline without atrophy or fasciculations  Motor:   Strength 5/5 in all 4 extremities  No pronator drift  Normal rapid alternating movements  Bulk/tone - normal   Fasiculations - none    Sensory:   Sensation intact to soft touch and pinprick in all 4 extremities  Cerebellar:   Finger-to-nose intact, normal heel to shin  Reflexes: 2+ in all 4 extremities  Pathologic reflexes - babinski reflex negative, Hoffmans reflex - negative    Gait:   Normal gait, could not do other part of gait due to her fibromyalgia, piriformis muscle pain involvement  ROS:  I personally reviewed ROS   Review of Systems   Constitutional: Negative  Negative for appetite change and fever  HENT: Positive for trouble swallowing  Negative for hearing loss, tinnitus and voice change  Eyes: Negative  Negative for photophobia and pain  Respiratory: Negative  Negative for shortness of breath  Cardiovascular: Negative  Negative for palpitations  Gastrointestinal: Negative  Negative for nausea and vomiting  Endocrine: Negative  Negative for cold intolerance and heat intolerance  Genitourinary: Positive for frequency and urgency  Negative for dysuria  Musculoskeletal: Positive for gait problem  Negative for myalgias and neck pain  Patient stated that her balance is off at times  Skin: Negative  Negative for rash  Neurological: Positive for weakness and headaches  Negative for dizziness, tremors, seizures, syncope, facial asymmetry, speech difficulty, light-headedness and numbness  Patient states that she has whole body weakness off and on   Patient states that she also have headache a lot everyday  Hematological: Negative  Does not bruise/bleed easily  Psychiatric/Behavioral: Positive for sleep disturbance  Negative for confusion and hallucinations          Patient states that she has a hard time staying asleep

## 2019-10-23 NOTE — ASSESSMENT & PLAN NOTE
Likely not an intervention candidate   C/w aspirin and zetia for primary prevention     BP is at goal currently <130/80  -Does not smoke  She does snore and wants to get sleep study  -denies any history of snoring    -I advised patient to avoid using NSAIDs for headaches or other pain and instead to just stick to tylenol    -I educated regarding mediterranean style diet and regular exercise regimen   -I educated patient/family regarding medication compliance

## 2019-10-24 ENCOUNTER — TELEPHONE (OUTPATIENT)
Dept: NEUROSURGERY | Facility: CLINIC | Age: 74
End: 2019-10-24

## 2019-10-24 ENCOUNTER — APPOINTMENT (OUTPATIENT)
Dept: PHYSICAL THERAPY | Facility: MEDICAL CENTER | Age: 74
End: 2019-10-24
Payer: MEDICARE

## 2019-10-24 NOTE — TELEPHONE ENCOUNTER
Contacted Chris Issa to discuss her symptoms as mentioned below  She reports she has continued to experience discomfort in her groin  Also has a hard lump and mild brusing in this area  Denies numbness, tingle, weakness in her leg  No s/s of infection including heat, drainage, etc      Advised that the "lump" and tenderness she is feeling could be the Mynx closure which can cause some local inflammation  This is water soluble and will resolve with time  Directed her to contact the office with any s/s of infection  OR if she should develop numbness, tingles, weakness, or worsening bruising

## 2019-10-24 NOTE — TELEPHONE ENCOUNTER
----- Message from Ye Lee sent at 10/24/2019  5:27 AM EDT -----  Regarding: Non-Urgent Medical Question  Contact: [phone]  I am still experiencing quite a bit of discomfort, plus I still have swelling, what I would describe as a hard lump  Should I be worried  ?     Kris Kimble

## 2019-10-29 ENCOUNTER — TELEPHONE (OUTPATIENT)
Dept: SLEEP CENTER | Facility: CLINIC | Age: 74
End: 2019-10-29

## 2019-10-29 NOTE — TELEPHONE ENCOUNTER
----- Message from Miguelito Lew MD sent at 10/29/2019 10:33 AM EDT -----  Insufficient data to justify sleep study  ----- Message -----  From: Elissa Colvin MA  Sent: 10/28/2019  10:36 AM EDT  To: [facility] Provider    This sleep study needs approval      If approved please sign and return to clerical pool  If denied please include reasons why  Also provide alternative testing if warranted  Please sign and return to clerical pool

## 2019-10-31 NOTE — TELEPHONE ENCOUNTER
This is not peer to peer review, in order for out sleep doctor to approved the sleep study we need more information/sysptoms other than snoring as to why the patient needs a sleep study and for our sleep doctor to approved and see the sleep study necessary for the patient

## 2019-11-01 NOTE — TELEPHONE ENCOUNTER
She has snoring, and sometimes has trouble breathing at times at night, and wakes up in the middle of the night gasping for air she stated to me

## 2019-11-04 ENCOUNTER — TELEPHONE (OUTPATIENT)
Dept: NEUROSURGERY | Facility: CLINIC | Age: 74
End: 2019-11-04

## 2019-11-04 NOTE — TELEPHONE ENCOUNTER
Contacted Sumi to discuss the response from Dr Nikkie Lucero  He agreed she would be ok to cancel her follow-up with him if she would like but has encouraged her to follow-up with neuro  Left her message to call back with questions

## 2019-12-18 ENCOUNTER — OFFICE VISIT (OUTPATIENT)
Dept: UROLOGY | Facility: MEDICAL CENTER | Age: 74
End: 2019-12-18
Payer: MEDICARE

## 2019-12-18 VITALS
WEIGHT: 121 LBS | BODY MASS INDEX: 22.84 KG/M2 | DIASTOLIC BLOOD PRESSURE: 82 MMHG | HEIGHT: 61 IN | SYSTOLIC BLOOD PRESSURE: 130 MMHG | HEART RATE: 72 BPM

## 2019-12-18 DIAGNOSIS — N30.10 INTERSTITIAL CYSTITIS: Primary | ICD-10-CM

## 2019-12-18 LAB
SL AMB  POCT GLUCOSE, UA: ABNORMAL
SL AMB LEUKOCYTE ESTERASE,UA: ABNORMAL
SL AMB POCT BILIRUBIN,UA: ABNORMAL
SL AMB POCT BLOOD,UA: ABNORMAL
SL AMB POCT CLARITY,UA: ABNORMAL
SL AMB POCT COLOR,UA: YELLOW
SL AMB POCT KETONES,UA: ABNORMAL
SL AMB POCT NITRITE,UA: ABNORMAL
SL AMB POCT PH,UA: 7
SL AMB POCT SPECIFIC GRAVITY,UA: 1.01
SL AMB POCT URINE PROTEIN: 7
SL AMB POCT UROBILINOGEN: 0.2

## 2019-12-18 PROCEDURE — 81003 URINALYSIS AUTO W/O SCOPE: CPT | Performed by: UROLOGY

## 2019-12-18 PROCEDURE — 99214 OFFICE O/P EST MOD 30 MIN: CPT | Performed by: UROLOGY

## 2019-12-18 NOTE — PROGRESS NOTES
[address] [facility]  [facility]  [address] [facility]  [place], [address] [place] [address]  [phone]  www  [email or web address]  org      NAME: Christina Lee  AGE: 69 y o  SEX: female  : 1945   MRN: [id number]    DATE: 2019  TIME: 9:28 AM    Assessment and Plan:      After discussing this, I recommend keeping the unit in place because her post herpetic neuralgia should resolve over time  She can undergo brain MRI with the InterStim in place  Her symptoms are intermittent, but it would be nice to have the unit in place for when the post herpetic neuralgia resolves  She can reactivate the unit  Currently the unit is off  Follow-up in 2020 as scheduled  I will remove the unit upon her request if she should call before then  I will do her history and physical and consent upon the day of surgery  Chief Complaint   Interested in possibly having InterStim removed    History of Present Illness   Interstitial cystitis:  Last office visit she was in remission and she was off [place]  She was supposed to follow-up in 1 year from 2019  Urgency and frequency:  Has InterStim  She has not been able to activate this since having the shingles because it causes pain  She also has not been able have MRIs that she has needed because of the unit being in place  She has been having a lot of problems with muscle pain, working around her statins, has carotid stenosis, recently had surgery for tinnitus  Frequency and nocturia, weak stream is intermittent        The following portions of the patient's history were reviewed and updated as appropriate: allergies, current medications, past family history, past medical history, past social history, past surgical history and problem list   Past Medical History:   Diagnosis Date    Allergy to adhesive tape     tears skin    Arthritis     Back pain     Cataract     Fecal smearing     Fibromyalgia, primary     Frequent urination     Hypercholesteremia     Hyperlipidemia     Hypothyroid     Hypothyroidism     Interstitial cystitis     has interStim    Interstitial cystitis     Irritable bowel syndrome     Kidney stone     Microscopic hematuria     Muscle spasm     Myofascial pain syndrome     Osteoarthritis     Osteoporosis     Other allergy, initial encounter     allergy to blue dye    Pelvic floor dysfunction     Pelvic pain     PONV (postoperative nausea and vomiting)     and pruritis    Rosacea     Seasonal allergies     Shingles     Sjogren's disease ([address] )     Skin cancer     left shoulder    Spondylisthesis     Urinary urgency     Wears dentures     lower partial    Wears glasses      Past Surgical History:   Procedure Laterality Date    ADENOIDECTOMY      APPENDECTOMY      CATARACT EXTRACTION Bilateral     COLONOSCOPY      ESOPHAGOGASTRODUODENOSCOPY N/A 5/25/2018    Procedure: ESOPHAGOGASTRODUODENOSCOPY (EGD); Surgeon: El Ceron MD;  Location: [facility]; Service: Gastroenterology    HYSTERECTOMY  1971    IR CEREBRAL ANGIOGRAPHY  10/18/2019    OTHER SURGICAL HISTORY Left     limb lengthening    OTHER SURGICAL HISTORY Bilateral 2012, 2014, 2017    interStim    OTHER SURGICAL HISTORY      tendon contracture right hip    MS CYSTOURETHROSCOPY N/A 10/4/2017    Procedure: CYSTOSCOPY;  Surgeon: Jenaro Triplett MD;  Location: [facility] OR;  Service: Urology    SALPINGOOPHORECTOMY  2002   [facility] [address]    due to fracture  with repair    TONSILLECTOMY       shoulder  Review of Systems   Review of Systems   HENT: Positive for hearing loss and tinnitus  Musculoskeletal: Positive for arthralgias and myalgias         Active Problem List     Patient Active Problem List   Diagnosis    Fecal incontinence    IC (interstitial cystitis)    Increased frequency of urination    Urinary urgency    Difficult or painful urination    Esophageal dysphagia    Generalized abdominal pain  Gastroesophageal reflux disease without esophagitis    Nausea    Pulsatile tinnitus of both ears    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    Stenosis of left internal carotid artery    Snoring       Objective   /82 (BP Location: Left arm, Patient Position: Sitting, Cuff Size: Adult)   Pulse 72   Ht 5' 1" (1 549 m)   Wt 54 9 kg (121 lb)   BMI 22 86 kg/m²     Physical Exam   Constitutional: She is oriented to person, place, and time  She appears well-developed and well-nourished  HENT:   Head: Normocephalic and atraumatic  Eyes: EOM are normal    Neck: Normal range of motion  Pulmonary/Chest: Effort normal    Musculoskeletal: Normal range of motion  Neurological: She is alert and oriented to person, place, and time  Skin: Skin is warm and dry  Psychiatric: She has a normal mood and affect   Her behavior is normal  Judgment and thought content normal            Current Medications     Current Outpatient Medications:     aspirin (ECOTRIN LOW STRENGTH) 81 mg EC tablet, Take 1 tablet (81 mg total) by mouth daily, Disp: 30 tablet, Rfl: 3    clonazePAM (KlonoPIN) 0 5 mg tablet, Take 0 5 mg by mouth daily as needed, Disp: , Rfl:     diclofenac sodium (VOLTAREN) 1 %, diclofenac 1 % topical gel  APPLY SPARINGLY TO AFFECTED AREA(S) ONCE DAILY prn, Disp: , Rfl:     gabapentin (NEURONTIN) 300 mg capsule, Take 400 mg by mouth 2 (two) times a day , Disp: , Rfl: 0    gabapentin (NEURONTIN) 300 mg capsule, Take 300 mg by mouth Three times a day, Disp: , Rfl:     levothyroxine 50 mcg tablet, Take 50 mcg by mouth daily, Disp: , Rfl:     ezetimibe (ZETIA) 10 mg tablet, ezetimibe 10 mg tablet  1qd, Disp: , Rfl:         Linden Zaidi MD

## 2020-01-29 ENCOUNTER — HOSPITAL ENCOUNTER (OUTPATIENT)
Dept: SLEEP CENTER | Facility: CLINIC | Age: 75
Discharge: HOME | End: 2020-01-29
Payer: MEDICARE

## 2020-01-29 DIAGNOSIS — R06.83 SNORING: ICD-10-CM

## 2020-01-29 PROCEDURE — 95810 POLYSOM 6/> YRS 4/> PARAM: CPT

## 2020-01-30 NOTE — PROGRESS NOTES
Sleep Study Documentation    Pre-Sleep Study       Sleep testing procedure explained to patient:YES    Patient napped prior to study:YES- less than 30 minutes  Napped after 2PM: yes    Caffeine:Dayshift worker after 12PM   Caffeine use:YES- chocolate  6 ounces    Alcohol:Dayshift workers after 5PM: Alcohol use:NO    Typical day for patient:YES       Study Documentation    Sleep Study Indications: snoring    Sleep Study: Diagnostic   Snore:Mild  Supplemental O2: no    Minimum SaO2 93 4  Baseline SaO2 87    EKG abnormalities: no     EEG abnormalities: no    Sleep Study Recorded < 2 hours: N/A    Sleep Study Recorded > 2 hours but incomplete study: N/A    Sleep Study Recorded 6 hours but no sleep obtained: NO    Patient classification: retired       Post-Sleep Study    Medication used at bedtime or during sleep study:NO    Patient reports time it took to fall asleep:less than 20 minutes    Patient reports waking up during study:3 or more times  Patient reports returning to sleep in 10 to 30 minutes  Patient reports sleeping 4 to 6 hours without dreaming  Patient reports sleep during study:better than usual    Patient rated sleepiness: Not sleepy or tired    PAP treatment:no

## 2020-02-03 ENCOUNTER — TELEPHONE (OUTPATIENT)
Dept: SLEEP CENTER | Facility: CLINIC | Age: 75
End: 2020-02-03

## 2020-02-03 NOTE — TELEPHONE ENCOUNTER
Discussed sleep study results with patient  No MELINA but has sleep related hypoxia       Consult scheduled in sleep clinic

## 2020-02-18 PROCEDURE — 87070 CULTURE OTHR SPECIMN AEROBIC: CPT | Performed by: PHYSICIAN ASSISTANT

## 2020-02-18 PROCEDURE — 87205 SMEAR GRAM STAIN: CPT | Performed by: PHYSICIAN ASSISTANT

## 2020-02-20 ENCOUNTER — OFFICE VISIT (OUTPATIENT)
Dept: SLEEP CENTER | Facility: CLINIC | Age: 75
End: 2020-02-20
Payer: MEDICARE

## 2020-02-20 VITALS
HEIGHT: 61 IN | SYSTOLIC BLOOD PRESSURE: 102 MMHG | BODY MASS INDEX: 22.84 KG/M2 | WEIGHT: 121 LBS | DIASTOLIC BLOOD PRESSURE: 60 MMHG | HEART RATE: 93 BPM

## 2020-02-20 DIAGNOSIS — G47.34 NOCTURNAL HYPOXIA: Primary | ICD-10-CM

## 2020-02-20 DIAGNOSIS — R13.19 ESOPHAGEAL DYSPHAGIA: ICD-10-CM

## 2020-02-20 DIAGNOSIS — R06.83 SNORING: ICD-10-CM

## 2020-02-20 PROCEDURE — 99214 OFFICE O/P EST MOD 30 MIN: CPT | Performed by: NURSE PRACTITIONER

## 2020-02-20 NOTE — PATIENT INSTRUCTIONS
1   Schedule set up of overnight oxygen with [facility]  2   Schedule over night pulse oximetry with [facility] after 3 weeks of using over night oxygen  3   Complete CXR  4  Schedule follow up visit in 3 months  5   Schedule an appointment with Dr Tom Wild to discuss possible eosinophilic esophagitis with increase in symptoms  Nursing Support:  When: Monday through Friday 7A-5PM except holidays  Where: Our direct line is [phone]  If you are having a true emergency please call 911  In the event that the line is busy or it is after hours please leave a voice message and we will return your call  Please speak clearly, leaving your full name, birth date, best number to reach you and the reason for your call  Medication refills: We will need the name of the medication, the dosage, the ordering provider, whether you get a 30 or 90 day refill, and the pharmacy name and address  Medications will be ordered by the provider only  Nurses cannot call in prescriptions  Please allow 7 days for medication refills  Physician requested updates: If your provider requested that you call with an update after starting medication, please be ready to provide us the medication and dosage, what time you take your medication, the time you attempt to fall asleep, time you fall asleep, when you wake up, and what time you get out of bed  Sleep Study Results: We will contact you with sleep study results and/or next steps after the physician has reviewed your testing

## 2020-02-20 NOTE — PROGRESS NOTES
Consultation - Efraín Grant 89, 1945, MRN: [id number]    2/20/2020        Reason for Consult / Principal Problem:  Nocturnal hypoxia  Cough     Thank you for the opportunity of participating in the evaluation and care of this patient in the Sleep Clinic at [facility]  Subjective:     HPI: Harjeet Ward is a 69y o  year old adult  She presents for a consultation regarding a finding of nocturnal hypoxia  She had a concern of loud snoring noted by her   She completed a diagnostic sleep study and is here to review the results and to discuss treatment options  She mentions that she suffers from chronic rhinitis with significant post-nasal secretions and chronic cough,which she attributes to rhinitis  She recently had repeat eval with ENT and daily sinus irrigations was recommended, due to intolerance of multiple medications tried in the past   She has excessive daytime sleepiness, which she attributes to fibromyalgia and difficulty sleeping due to pain and being a light sleeper her whole life  She reports being easily awakened by sounds, her  snoring and her cats    Her comorbid conditions include GERD with intermittent esophageal dysphagia, urinary urgency and frequency due to interstitial cystitis      Review of Systems      Genitourinary need to urinate more than twice a night   Cardiology palpitations/fluttering feeling in the chest   Gastrointestinal abdominal pain or cramping that disturb sleep    Neurology frequent headaches, awaken with headache, muscle weakness, forgetfulness, poor concentration or confusion,  and balance problems   Constitutional claustrophobia, fatigue and weight change   Integumentary rash or dry skin   Psychiatry none   Musculoskeletal joint pain, muscle aches, back pain and leg cramps   Pulmonary chest tightness and snoring   ENT throat clearing and ringing in ears   Endocrine frequent urination   Hematological none     Employment:  Retired nurse    Sleep Schedule:       Bedtime:  10:00pm      Latency:  1-2 hours      Wakeup time:  Gets up at 5:00am and sometimes returns to bed and returns to sleep    Awakenings:       Frequency:  3-4 times, sometimes every 2 hours      Causes: For urination, repositioning, noises      Duration:  Sometimes returns in a few minutes, sometimes can't return to sleep for the remainder of the night    Daytime Sleepiness / Inappropriate Sleep:       Most severe:  3:00pm       Naps :  Lays down at 3:00pm due to fatigue and sometimes falls asleep briefly      Time:  3:00pm      Duration:  15 minutes       Inappropriate drowsiness / sleep:  She dozes while watching TV, reading or even at a concert    Snoring:  She snores     Apnea: No witnessed apnea    Change in Weight:  Weight has been relatively stable    Restless Leg Syndrome:  No clinical symptoms consistent with this diagnosis     Other Complaints:  She clenches her jaw and uses a  for this  She awakens with headaches arising from the occipital region forward  She typically awakens in the middle of the night with headaches  No reports of sleep walking, sleep talking, sleep paralysis or hallucinations surrounding sleep  She has neuropathic pain, post-herpetic neuralgia and takes gabapentin at bedtime  She gets some leg cramps at times  Social History:      Caffeine:  1-2 ounces of Coke in the am       Tobacco:   reports that she has never smoked  She has never used smokeless tobacco        Alcohol:   reports that she drinks alcohol  Rarely drinks wine      Drugs:   reports that she does not use drugs         The review of systems and following portions of the patient's history were reviewed and updated as appropriate: allergies, current medications, past family history, past medical history, past social history, past surgical history and problem list         Objective:       Vitals: 02/20/20 1259   BP: 102/60   Pulse: 93   Weight: 54 9 kg (121 lb)   Height: 5' 1" (1 549 m)     Body mass index is 22 86 kg/m²  Neck Circumference: 13  [place] Sleepiness Scale: Total score: 11      Current Outpatient Medications:     aspirin (ECOTRIN LOW STRENGTH) 81 mg EC tablet, Take 1 tablet (81 mg total) by mouth daily (Patient not taking: Reported on 2/18/2020), Disp: 30 tablet, Rfl: 3    clonazePAM (KlonoPIN) 0 5 mg tablet, Take 0 5 mg by mouth daily as needed, Disp: , Rfl:     diclofenac sodium (VOLTAREN) 1 %, diclofenac 1 % topical gel  APPLY SPARINGLY TO AFFECTED AREA(S) ONCE DAILY prn, Disp: , Rfl:     ezetimibe (ZETIA) 10 mg tablet, ezetimibe 10 mg tablet  1qd, Disp: , Rfl:     gabapentin (NEURONTIN) 300 mg capsule, Take 400 mg by mouth 2 (two) times a day , Disp: , Rfl: 0    gabapentin (NEURONTIN) 300 mg capsule, Take 300 mg by mouth Three times a day, Disp: , Rfl:     levothyroxine 50 mcg tablet, Take 50 mcg by mouth daily, Disp: , Rfl:     Physical Exam  General Appearance:   Alert, cooperative, no distress, appears stated age     Head:   Normocephalic, without obvious abnormality, atraumatic     Eyes:   PERRL, conjunctiva/corneas clear, EOM's intact          Nose:  Nares normal, septum midline, mucosa normal, no drainage or sinus tenderness           Throat:  Lips, teeth and gums normal; tongue normal size and  shape and midline in position; mucosa moist with low-lying soft palatal tissue, uvula thick at the base and barely visualized, tonsils not visualized, Mallampati class 3-4       Neck:  Supple, symmetrical, trachea midline, no adenopathy;  Thyroid: No enlargement, tenderness or nodules; no carotid bruit or JVD     Lungs:      Clear to auscultation bilaterally, respirations unlabored     Heart:   Regular rate and rhythm, S1 and S2 normal, no murmur, rub or gallop       Extremities:  Extremities normal, atraumatic, no cyanosis or edema       Skin:  Skin color, texture, turgor normal, no rashes or lesions       Neurologic:  No focal deficits noted  Normal strength, sensation throughout     Sleep Study Results:    Results of the diagnostic sleep study, completed on 1/29/20, were reviewed with the patient and are as follows:  IMPRESSION:  1  Sleep-related hypoxia  2  Snoring with features of upper airway resistance  3  Sleep maintenance insomnia  She may benefit from supplemental oxygen during sleep  Other treatment options that may be considered include  use of a mandibular advancement device or upper airway surgery  Clinical correlation is advised  ASSESSMENT / PLAN     1  Nocturnal hypoxia  Oxygen    Oxygen Supplies    Pulse oximetry overnight    XR chest pa & lateral    Ambulatory referral to Gastroenterology   2  Snoring     3  Esophageal dysphagia  Ambulatory referral to Gastroenterology         Counseling / Coordination of Care  Total clinic time spent today 60 minutes  Greater than 50% of total time was spent with the patient and / or family counseling and / or coordination of care  A description of the counseling / coordination of care:     diagnostic results, instructions for management, risk factor reductions, prognosis, patient and family education, impressions, risks and benefits of treatment options and importance of compliance with treatment    Today we reviewed the results of the recent diagnostic sleep study  The AHI was less than 5, which is not diagnostic for sleep apnea  The lowest oxygen saturation during the study was less than or equal to 88% for 7 8 minutes  The amount of sleep time less than 90% was 43 8 minutes  The lowest oxygen saturation was 87 0%     There was some mild snoring noted, which is not classified as sleep apnea  Treatment with CPAP is not indicated  Breathe Right Nasal Strips can sometimes be beneficial with mild snoring  The use of a mandibular advancement device can also be helpful to improve snoring   Nocturnal oxygen has been ordered at 2L per minute via nasal cannula  A follow up nocturnal pulse ox was ordered to be completed after use of nocturnal oxygen for 3-4 weeks  She will also complete a chest X-ray, due to ongoing cough in addition to nocturnal hypoxia  We discussed her increase in secretions, history of allergies requiring IT in the past,  in conjunction with esophageal dysphagia she experiences  I recommended she follow up with Dr Quinten Bush to discuss possible EOE  She will schedule a follow up at the [address] [place] [address] in 3 months  The following instructions have been given to the patient today:    Patient Instructions   1  Schedule set up of overnight oxygen with [facility]  2   Schedule over night pulse oximetry with [facility] after 3 weeks of using over night oxygen  3   Complete CXR  4  Schedule follow up visit in 3 months  5   Schedule an appointment with Dr Quinten Bush to discuss possible eosinophilic esophagitis with increase in symptoms  Nursing Support:  When: Monday through Friday 7A-5PM except holidays  Where: Our direct line is [phone]  If you are having a true emergency please call 911  In the event that the line is busy or it is after hours please leave a voice message and we will return your call  Please speak clearly, leaving your full name, birth date, best number to reach you and the reason for your call  Medication refills: We will need the name of the medication, the dosage, the ordering provider, whether you get a 30 or 90 day refill, and the pharmacy name and address  Medications will be ordered by the provider only  Nurses cannot call in prescriptions  Please allow 7 days for medication refills  Physician requested updates:  If your provider requested that you call with an update after starting medication, please be ready to provide us the medication and dosage, what time you take your medication, the time you attempt to fall asleep, time you fall asleep, when you wake up, and what time you get out of bed   Sleep Study Results: We will contact you with sleep study results and/or next steps after the physician has reviewed your testing        Yasemin Root, [address] [facility]

## 2020-02-21 ENCOUNTER — TELEPHONE (OUTPATIENT)
Dept: GASTROENTEROLOGY | Facility: AMBULARY SURGERY CENTER | Age: 75
End: 2020-02-21

## 2020-02-21 ENCOUNTER — TELEPHONE (OUTPATIENT)
Dept: SLEEP CENTER | Facility: CLINIC | Age: 75
End: 2020-02-21

## 2020-02-21 NOTE — TELEPHONE ENCOUNTER
I spoke with patient, advised I will fax scripts for oxygen, supplies and overnight oximetry study to [facility]   Patient will go for CXR in a few days and will be calling to schedule follow up with DR Rubina Banegas    Patient will call with questions or concerns    Scripts for oxygen and supplies faxed to [phone]    Overnight oximetry order faxed to [facility]

## 2020-02-21 NOTE — TELEPHONE ENCOUNTER
Patient calling has consult to see a GI doctor , to rule out Eosinophilic esophagitis , was seen in past by Dr Keseha Orellana ,  I was able to put her in for 4/21st with a PA but pt would like a sooner appt   , please advise Thanks

## 2020-02-28 ENCOUNTER — TELEPHONE (OUTPATIENT)
Dept: SLEEP CENTER | Facility: CLINIC | Age: 75
End: 2020-02-28

## 2020-02-28 DIAGNOSIS — R11.0 NAUSEA: ICD-10-CM

## 2020-02-28 DIAGNOSIS — R14.0 ABDOMINAL BLOATING: ICD-10-CM

## 2020-02-28 RX ORDER — ONDANSETRON 4 MG/1
4 TABLET, ORALLY DISINTEGRATING ORAL EVERY 8 HOURS PRN
Qty: 60 TABLET | Refills: 3 | Status: SHIPPED | OUTPATIENT
Start: 2020-02-28 | End: 2020-03-02

## 2020-02-28 RX ORDER — DICYCLOMINE HYDROCHLORIDE 10 MG/1
10 CAPSULE ORAL
Qty: 120 CAPSULE | Refills: 3 | Status: SHIPPED | OUTPATIENT
Start: 2020-02-28 | End: 2021-03-31 | Stop reason: ALTCHOICE

## 2020-02-28 NOTE — TELEPHONE ENCOUNTER
----- Message from Mika Lee sent at 2/28/2020  3:42 PM EST -----  Regarding: Non-Urgent Medical Question  Contact: [phone]  As yet, I have not yet heard from the medical supply company

## 2020-03-02 DIAGNOSIS — R11.0 NAUSEA: Primary | ICD-10-CM

## 2020-03-02 RX ORDER — ONDANSETRON 4 MG/1
4 TABLET, FILM COATED ORAL EVERY 8 HOURS PRN
Qty: 60 TABLET | Refills: 3 | Status: SHIPPED | OUTPATIENT
Start: 2020-03-02 | End: 2021-03-31 | Stop reason: ALTCHOICE

## 2020-03-27 ENCOUNTER — TELEPHONE (OUTPATIENT)
Dept: UROLOGY | Facility: MEDICAL CENTER | Age: 75
End: 2020-03-27

## 2020-03-27 ENCOUNTER — TELEMEDICINE (OUTPATIENT)
Dept: UROLOGY | Facility: AMBULATORY SURGERY CENTER | Age: 75
End: 2020-03-27
Payer: MEDICARE

## 2020-03-27 DIAGNOSIS — N94.819 VULVODYNIA: ICD-10-CM

## 2020-03-27 DIAGNOSIS — R14.0 ABDOMINAL BLOATING: ICD-10-CM

## 2020-03-27 DIAGNOSIS — N39.0 RECURRENT URINARY TRACT INFECTION: Primary | ICD-10-CM

## 2020-03-27 PROCEDURE — 99213 OFFICE O/P EST LOW 20 MIN: CPT | Performed by: NURSE PRACTITIONER

## 2020-03-27 RX ORDER — PHENAZOPYRIDINE HYDROCHLORIDE 200 MG/1
200 TABLET, FILM COATED ORAL 3 TIMES DAILY PRN
Qty: 20 TABLET | Refills: 0 | Status: SHIPPED | OUTPATIENT
Start: 2020-03-27 | End: 2020-06-11

## 2020-03-27 RX ORDER — LIDOCAINE HYDROCHLORIDE 20 MG/ML
JELLY TOPICAL 3 TIMES DAILY PRN
Qty: 30 ML | Refills: 3 | Status: SHIPPED | OUTPATIENT
Start: 2020-03-27

## 2020-03-27 NOTE — PATIENT INSTRUCTIONS
Prescription for Pyridium and lidocaine jelly sent to pharmacy as requested  Repeat urinalysis and urine culture in 2 weeks (1 week after completion of p o   Antibiotic therapy)  Continue with prior medications as previously discussed with Dr Saad Ovalle adequate hydration with upwards to 40-60 oz of water per day  Ensure complete bladder emptying with urination  Proper perineal cleansing after urinating (front to back)

## 2020-03-27 NOTE — TELEPHONE ENCOUNTER
Spoke with the patient; She's agreed to a virtual visit, but is requesting it be a telephone visit, not video  A call is scheduled to be made to her at 03:30 PM for NOEMÍ Kitchen

## 2020-03-27 NOTE — PROGRESS NOTES
Virtual Regular Visit    Problem List Items Addressed This Visit     None      Visit Diagnoses     Recurrent urinary tract infection    -  Primary    Relevant Medications    phenazopyridine (PYRIDIUM) 200 mg tablet    Other Relevant Orders    Urinalysis with microscopic    Urine culture    Abdominal bloating        Vulvodynia        Relevant Medications    lidocaine (XYLOCAINE) 2 % topical gel           Reason for visit is recurrent urinary tract infection, urinary frequency and urgency    Encounter provider NOEMÍ Sharif    Provider located at [address]      Recent Visits  No visits were found meeting these conditions  Showing recent visits within past 7 days and meeting all other requirements     Today's Visits  Date Type Provider Dept   03/27/20 [address] [place] [address], [facility] [place] [facility]   Showing today's visits and meeting all other requirements     Future Appointments  Date Type Provider Dept   03/27/20 [address] [place], [place] [address]NOEMÍ [facility]   Showing future appointments within next 150 days and meeting all other requirements        The patient was identified by name and date of birth  Yessy Walsh was informed that this is a telemedicine visit and that the visit is being conducted through [facility] and patient was informed that this is not a secure, HIPAA-complaint platform  she agrees to proceed     My office door was closed  No one else was in the room  She acknowledged consent and understanding of privacy and security of the video platform  The patient has agreed to participate and understands they can discontinue the visit at any time  Assessment and plan    Recurrent urinary tract infection  · Most recent urine culture received today [facility] positive for Enterococcus patient given antibiotics-amoxicillin x2 weeks    Patient to take to completion  · Repeat urinalysis and urine culture 1 week after completion of p o  Antibiotics  · Ensure complete bladder emptying after urination  · Maintain adequate hydration upwards to 40-60 oz per day  · Proper perineal cleansing-front to back, use of baby wipes  · Continue with other medications as previously ordered by Dr Elfego Saunders, aloe vera, Bentyl    Urinary frequency and urgency/dysuria  · Continue to alternate between levels for InterStim  · Other interventions as above  · Prescription for Pyridium sent to pharmacy as requested  · Consider repeating cystoscopy if continued or worsening symptoms despite interventions    Vulvodynia  · Uro jet lidocaine prescription provided      Patient is aware this is a billable service  Subjective  Leonid Betancourt is a 69 y o  adult female with a complaint of frequent/recurrent urinary tract infections, urinary frequency, urgency and dysuria and vulvodynia  She is managed by Dr Gregory Husbands  Patient with a significant past medical history of interstitial cystitis in urinary frequency and urgency  Patient does have a history of shingles and has stop heard from herpetic neuralgia is   She has an InterStim in place in currently has on settings 0 9 and reports the inability to tolerate manipulation of settings to assist with the reduction of urinary symptoms  Patient reports recent urinary tract infection  She reports urine culture came back positive for Klebsiella and she was treated for Bactrim couple of days after that antibiotic therapy was completed patient developed urinary symptoms once again and urine culture was performed  The 2nd urine culture came back positive for Enterococcus and patient was started on amoxicillin this morning  She currently denies hematuria, suprapubic abdominal pain and flank pain  She denies fever and chills  Patient reports significant urinary frequency and nocturia reporting that she is unable to sleep at night secondary to the discomfort    She also reports a significant amount of vulvodynia for which she has used topical lidocaine in the past with good results  Patient does report the use of bladder instillations approximately 20 years ago which she was not able to tolerate for the treatment of her interstitial cystitis  Past Medical History:   Diagnosis Date    Allergy to adhesive tape     tears skin    Arthritis     Back pain     Cataract     Fecal smearing     Fibromyalgia, primary     Frequent urination     Hypercholesteremia     Hyperlipidemia     Hypothyroid     Hypothyroidism     Interstitial cystitis     has interStim    Interstitial cystitis     Irritable bowel syndrome     Kidney stone     Microscopic hematuria     Muscle spasm     Myofascial pain syndrome     Osteoarthritis     Osteoporosis     Other allergy, initial encounter     allergy to blue dye    Pelvic floor dysfunction     Pelvic pain     PONV (postoperative nausea and vomiting)     and pruritis    Rosacea     Seasonal allergies     Shingles     Sjogren's disease ([address] )     Skin cancer     left shoulder    Spondylisthesis     Urinary urgency     Wears dentures     lower partial    Wears glasses        Past Surgical History:   Procedure Laterality Date    ADENOIDECTOMY      APPENDECTOMY      CATARACT EXTRACTION Bilateral     COLONOSCOPY      ESOPHAGOGASTRODUODENOSCOPY N/A 5/25/2018    Procedure: ESOPHAGOGASTRODUODENOSCOPY (EGD); Surgeon: Tony Martin MD;  Location: [facility] GI LAB;   Service: Gastroenterology    HYSTERECTOMY  1971    IR CEREBRAL ANGIOGRAPHY  10/18/2019    OTHER SURGICAL HISTORY Left     limb lengthening    OTHER SURGICAL HISTORY Bilateral 2012, 2014, 2017    interStim    OTHER SURGICAL HISTORY      tendon contracture right hip    SC CYSTOURETHROSCOPY N/A 10/4/2017    Procedure: CYSTOSCOPY;  Surgeon: Kareem Patiño MD;  Location: [facility] OR;  Service: Urology    SALPINGOOPHORECTOMY  2002    SEPTOPLASTY  1980    due to fracture  with repair    TONSILLECTOMY         Current Outpatient Medications   Medication Sig Dispense Refill    aspirin (ECOTRIN LOW STRENGTH) 81 mg EC tablet Take 1 tablet (81 mg total) by mouth daily (Patient not taking: Reported on 2/18/2020) 30 tablet 3    clonazePAM (KlonoPIN) 0 5 mg tablet Take 0 5 mg by mouth daily as needed      diclofenac sodium (VOLTAREN) 1 % diclofenac 1 % topical gel   APPLY SPARINGLY TO AFFECTED AREA(S) ONCE DAILY prn      dicyclomine (BENTYL) 10 mg capsule Take 1 capsule (10 mg total) by mouth 4 (four) times a day (before meals and at bedtime) 120 capsule 3    ezetimibe (ZETIA) 10 mg tablet ezetimibe 10 mg tablet   1qd      gabapentin (NEURONTIN) 300 mg capsule Take 400 mg by mouth 2 (two) times a day   0    gabapentin (NEURONTIN) 300 mg capsule Take 300 mg by mouth Three times a day      levothyroxine 50 mcg tablet Take 50 mcg by mouth daily      lidocaine (XYLOCAINE) 2 % topical gel Apply topically 3 (three) times a day as needed for mild pain or moderate pain (Vulvodynia) 30 mL 3    ondansetron (ZOFRAN) 4 mg tablet Take 1 tablet (4 mg total) by mouth every 8 (eight) hours as needed for nausea or vomiting 60 tablet 3    phenazopyridine (PYRIDIUM) 200 mg tablet Take 1 tablet (200 mg total) by mouth 3 (three) times a day as needed for bladder spasms 20 tablet 0     No current facility-administered medications for this visit  Allergies   Allergen Reactions    Other      Adhesive tape causes blisters    Sulfa Antibiotics Rash    Atorvastatin      Muscle aches   Blue Dyes (Parenteral) Hives    Macrobid [Nitrofurantoin]     Medical Tape      blisters  blisters    Sulfate        Review of Systems   Constitutional: Negative for chills and fever  Respiratory: Negative for cough and shortness of breath  Cardiovascular: Negative for chest pain  Gastrointestinal: Negative for abdominal distention, abdominal pain, blood in stool, nausea and vomiting  Genitourinary: Positive for dysuria, frequency and urgency  Negative for difficulty urinating, enuresis, flank pain and hematuria  Skin: Negative for rash  Physical Exam   Constitutional: She is oriented to person, place, and time  She appears well-developed  She appears distressed  Pulmonary/Chest: Effort normal    Neurological: She is alert and oriented to person, place, and time  I spent 12 minutes with the patient during this visit      NOEMÍ Cuevas

## 2020-03-27 NOTE — TELEPHONE ENCOUNTER
Left message; Requested that the patient call back to set up a tele-visit in regards to her frequent UTIs

## 2020-04-10 ENCOUNTER — TELEPHONE (OUTPATIENT)
Dept: UROLOGY | Facility: CLINIC | Age: 75
End: 2020-04-10

## 2020-04-15 ENCOUNTER — TELEPHONE (OUTPATIENT)
Dept: SLEEP CENTER | Facility: CLINIC | Age: 75
End: 2020-04-15

## 2020-04-15 ENCOUNTER — HOSPITAL ENCOUNTER (OUTPATIENT)
Dept: RADIOLOGY | Facility: HOSPITAL | Age: 75
Discharge: HOME | End: 2020-04-15
Payer: MEDICARE

## 2020-04-15 ENCOUNTER — TRANSCRIBE ORDERS (OUTPATIENT)
Dept: ADMINISTRATIVE | Facility: HOSPITAL | Age: 75
End: 2020-04-15

## 2020-04-15 ENCOUNTER — LAB (OUTPATIENT)
Dept: LAB | Facility: HOSPITAL | Age: 75
End: 2020-04-15
Payer: MEDICARE

## 2020-04-15 DIAGNOSIS — E03.9 PRIMARY HYPOTHYROIDISM: ICD-10-CM

## 2020-04-15 DIAGNOSIS — G47.34 NOCTURNAL HYPOXIA: ICD-10-CM

## 2020-04-15 DIAGNOSIS — E78.5 HYPERLIPIDEMIA, UNSPECIFIED HYPERLIPIDEMIA TYPE: ICD-10-CM

## 2020-04-15 DIAGNOSIS — E03.9 PRIMARY HYPOTHYROIDISM: Primary | ICD-10-CM

## 2020-04-15 DIAGNOSIS — E78.5 HYPERLIPIDEMIA, UNSPECIFIED HYPERLIPIDEMIA TYPE: Primary | ICD-10-CM

## 2020-04-15 DIAGNOSIS — N39.0 RECURRENT URINARY TRACT INFECTION: ICD-10-CM

## 2020-04-15 LAB
BACTERIA UR QL AUTO: ABNORMAL /HPF
BILIRUB UR QL STRIP: NEGATIVE
CHOLEST SERPL-MCNC: 214 MG/DL (ref 50–200)
CLARITY UR: ABNORMAL
COLOR UR: YELLOW
GLUCOSE UR STRIP-MCNC: NEGATIVE MG/DL
HDLC SERPL-MCNC: 76 MG/DL
HGB UR QL STRIP.AUTO: ABNORMAL
KETONES UR STRIP-MCNC: NEGATIVE MG/DL
LDLC SERPL CALC-MCNC: 127 MG/DL (ref 0–100)
LEUKOCYTE ESTERASE UR QL STRIP: NEGATIVE
MUCOUS THREADS UR QL AUTO: ABNORMAL
NITRITE UR QL STRIP: NEGATIVE
NON-SQ EPI CELLS URNS QL MICRO: ABNORMAL /HPF
NONHDLC SERPL-MCNC: 138 MG/DL
PH UR STRIP.AUTO: 7.5 [PH]
PROT UR STRIP-MCNC: NEGATIVE MG/DL
RBC #/AREA URNS AUTO: ABNORMAL /HPF
SP GR UR STRIP.AUTO: 1.01 (ref 1–1.03)
TRIGL SERPL-MCNC: 56 MG/DL
TSH SERPL DL<=0.05 MIU/L-ACNC: 4.7 UIU/ML (ref 0.36–3.74)
UROBILINOGEN UR QL STRIP.AUTO: 0.2 E.U./DL
WBC #/AREA URNS AUTO: ABNORMAL /HPF

## 2020-04-15 PROCEDURE — 81001 URINALYSIS AUTO W/SCOPE: CPT

## 2020-04-15 PROCEDURE — 87086 URINE CULTURE/COLONY COUNT: CPT

## 2020-04-15 PROCEDURE — 36415 COLL VENOUS BLD VENIPUNCTURE: CPT

## 2020-04-15 PROCEDURE — 71046 X-RAY EXAM CHEST 2 VIEWS: CPT

## 2020-04-15 PROCEDURE — 80061 LIPID PANEL: CPT

## 2020-04-15 PROCEDURE — 84443 ASSAY THYROID STIM HORMONE: CPT

## 2020-04-16 LAB — BACTERIA UR CULT: NORMAL

## 2020-05-01 ENCOUNTER — TELEMEDICINE (OUTPATIENT)
Dept: UROLOGY | Facility: AMBULATORY SURGERY CENTER | Age: 75
End: 2020-05-01
Payer: MEDICARE

## 2020-05-01 DIAGNOSIS — N94.819 VULVODYNIA: ICD-10-CM

## 2020-05-01 DIAGNOSIS — N39.0 RECURRENT URINARY TRACT INFECTION: Primary | ICD-10-CM

## 2020-05-01 DIAGNOSIS — R30.0 DYSURIA: ICD-10-CM

## 2020-05-01 DIAGNOSIS — R35.0 URINARY FREQUENCY: ICD-10-CM

## 2020-05-01 PROCEDURE — 99214 OFFICE O/P EST MOD 30 MIN: CPT | Performed by: NURSE PRACTITIONER

## 2020-05-21 ENCOUNTER — TELEMEDICINE (OUTPATIENT)
Dept: SLEEP CENTER | Facility: CLINIC | Age: 75
End: 2020-05-21
Payer: MEDICARE

## 2020-05-21 VITALS — HEIGHT: 61 IN | WEIGHT: 120 LBS | BODY MASS INDEX: 22.66 KG/M2

## 2020-05-21 DIAGNOSIS — R06.83 SNORING: ICD-10-CM

## 2020-05-21 DIAGNOSIS — G47.34 IDIOPATHIC SLEEP RELATED NONOBSTRUCTIVE ALVEOLAR HYPOVENTILATION: ICD-10-CM

## 2020-05-21 DIAGNOSIS — G47.8 UPPER AIRWAY RESISTANCE SYNDROME: Primary | ICD-10-CM

## 2020-05-21 DIAGNOSIS — G47.34 NOCTURNAL HYPOXIA: ICD-10-CM

## 2020-05-21 PROCEDURE — 99442 PR PHYS/QHP TELEPHONE EVALUATION 11-20 MIN: CPT | Performed by: NURSE PRACTITIONER

## 2020-05-22 PROBLEM — G47.34 IDIOPATHIC SLEEP RELATED NONOBSTRUCTIVE ALVEOLAR HYPOVENTILATION: Status: ACTIVE | Noted: 2020-05-22

## 2020-05-28 ENCOUNTER — TELEPHONE (OUTPATIENT)
Dept: SLEEP CENTER | Facility: CLINIC | Age: 75
End: 2020-05-28

## 2020-06-09 ENCOUNTER — TELEPHONE (OUTPATIENT)
Dept: SLEEP CENTER | Facility: CLINIC | Age: 75
End: 2020-06-09

## 2020-06-10 DIAGNOSIS — N39.0 RECURRENT URINARY TRACT INFECTION: ICD-10-CM

## 2020-06-11 RX ORDER — PHENAZOPYRIDINE HYDROCHLORIDE 200 MG/1
200 TABLET, FILM COATED ORAL 3 TIMES DAILY PRN
Qty: 20 TABLET | Refills: 0 | Status: SHIPPED | OUTPATIENT
Start: 2020-06-11

## 2020-08-06 ENCOUNTER — TRANSCRIBE ORDERS (OUTPATIENT)
Dept: ADMINISTRATIVE | Facility: HOSPITAL | Age: 75
End: 2020-08-06

## 2020-08-06 DIAGNOSIS — Z12.31 VISIT FOR SCREENING MAMMOGRAM: Primary | ICD-10-CM

## 2020-08-12 ENCOUNTER — TELEPHONE (OUTPATIENT)
Dept: SLEEP CENTER | Facility: CLINIC | Age: 75
End: 2020-08-12

## 2020-08-12 NOTE — TELEPHONE ENCOUNTER
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

## 2020-08-27 ENCOUNTER — TELEPHONE (OUTPATIENT)
Dept: SLEEP CENTER | Facility: CLINIC | Age: 75
End: 2020-08-27

## 2020-08-27 NOTE — TELEPHONE ENCOUNTER
----- Message from Ford Lee sent at 8/27/2020 10:36 AM EDT -----  Regarding: Non-Urgent Medical Question  Contact: [phone]  Since we find the loud noise from the air compressor disruptive, we have been researching other brands  Is everything [employer] advertises true  If so we would consider it  I do see people with the portable ones at the [facility], and they are quiet  So, two questions :   Does Medicare cover it ? Assuming it doesnt, what is your recommendation?

## 2020-10-06 ENCOUNTER — PATIENT MESSAGE (OUTPATIENT)
Dept: SLEEP CENTER | Facility: CLINIC | Age: 75
End: 2020-10-06

## 2020-10-19 ENCOUNTER — TELEPHONE (OUTPATIENT)
Dept: UROLOGY | Facility: MEDICAL CENTER | Age: 75
End: 2020-10-19

## 2020-10-20 ENCOUNTER — HOSPITAL ENCOUNTER (OUTPATIENT)
Dept: MAMMOGRAPHY | Facility: MEDICAL CENTER | Age: 75
Discharge: HOME | End: 2020-10-20
Payer: MEDICARE

## 2020-10-20 VITALS — WEIGHT: 120 LBS | BODY MASS INDEX: 22.66 KG/M2 | HEIGHT: 61 IN

## 2020-10-20 DIAGNOSIS — Z12.31 VISIT FOR SCREENING MAMMOGRAM: ICD-10-CM

## 2020-10-20 PROCEDURE — 77063 BREAST TOMOSYNTHESIS BI: CPT

## 2020-10-20 PROCEDURE — 77067 SCR MAMMO BI INCL CAD: CPT

## 2020-10-28 ENCOUNTER — TELEPHONE (OUTPATIENT)
Dept: SLEEP CENTER | Facility: CLINIC | Age: 75
End: 2020-10-28

## 2020-10-29 ENCOUNTER — PATIENT MESSAGE (OUTPATIENT)
Dept: SLEEP CENTER | Facility: CLINIC | Age: 75
End: 2020-10-29

## 2020-11-05 ENCOUNTER — TELEPHONE (OUTPATIENT)
Dept: UROLOGY | Facility: MEDICAL CENTER | Age: 75
End: 2020-11-05

## 2020-11-05 ENCOUNTER — PATIENT MESSAGE (OUTPATIENT)
Dept: UROLOGY | Facility: MEDICAL CENTER | Age: 75
End: 2020-11-05

## 2021-03-10 DIAGNOSIS — Z23 ENCOUNTER FOR IMMUNIZATION: ICD-10-CM

## 2021-03-11 ENCOUNTER — TRANSCRIBE ORDERS (OUTPATIENT)
Dept: ADMINISTRATIVE | Facility: HOSPITAL | Age: 76
End: 2021-03-11

## 2021-03-11 DIAGNOSIS — R06.81 APNEA: Primary | ICD-10-CM

## 2021-03-31 ENCOUNTER — OFFICE VISIT (OUTPATIENT)
Dept: SLEEP CENTER | Facility: CLINIC | Age: 76
End: 2021-03-31
Payer: MEDICARE

## 2021-03-31 VITALS — BODY MASS INDEX: 22.67 KG/M2 | HEIGHT: 61 IN | DIASTOLIC BLOOD PRESSURE: 58 MMHG | SYSTOLIC BLOOD PRESSURE: 108 MMHG

## 2021-03-31 DIAGNOSIS — R06.81 APNEA: ICD-10-CM

## 2021-03-31 DIAGNOSIS — G47.8 UPPER AIRWAY RESISTANCE SYNDROME: Primary | ICD-10-CM

## 2021-03-31 DIAGNOSIS — G47.34 NOCTURNAL HYPOXIA: ICD-10-CM

## 2021-03-31 PROCEDURE — 99213 OFFICE O/P EST LOW 20 MIN: CPT | Performed by: NURSE PRACTITIONER

## 2021-03-31 RX ORDER — BEMPEDOIC ACID 180 MG/1
180 TABLET, FILM COATED ORAL 3 TIMES WEEKLY
COMMUNITY

## 2021-03-31 NOTE — PROGRESS NOTES
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  Fibromyalgia      Thank you for the opportunity of participating in the evaluation and care of this patient in the Sleep Clinic at [facility]  HPI: Saroj High is a 70y o  year old adult  She had a concern of loud snoring noted by her   She completed a diagnostic sleep study which did not identify MELINA, but nocturnal hypoxia was noted  Lowest SpO2 was 87% with 43 8 minutes less than 90%  An overnight oxygen level was also ordered and completed in 2020  Oxygen was less than 88% for 1hr 51 minutes  Nocturnal oxygen was ordered and she began use  She suffers from chronic rhinitis with significant post-nasal secretions and chronic cough,which she attributes to rhinitis  She was having excessive daytime sleepiness, which she attributes to fibromyalgia and difficulty sleeping due to pain and being a light sleeper her whole life  She also has interstitial cystitis and post-herpetic neuralgia in her pelvic floor which causes pain affecting her sleep       Review of Systems      Genitourinary need to urinate more than twice a night   Cardiology none   Gastrointestinal none   Neurology frequent headaches and muscle weakness   Constitutional fatigue   Integumentary rash or dry skin and itching   Psychiatry none   Musculoskeletal muscle aches and leg cramps   Pulmonary chest tightness   ENT throat clearing and ringing in ears   Endocrine none   Hematological none       Current Outpatient Medications:     aspirin (ECOTRIN LOW STRENGTH) 81 mg EC tablet, Take 1 tablet (81 mg total) by mouth daily, Disp: 30 tablet, Rfl: 3    Bempedoic Acid (Nexletol) 180 MG TABS, Take 180 mg by mouth 3 (three) times a week, Disp: , Rfl:     diclofenac sodium (VOLTAREN) 1 %, diclofenac 1 % topical gel  APPLY SPARINGLY TO AFFECTED AREA(S) ONCE DAILY prn, Disp: , Rfl:     gabapentin (NEURONTIN) 300 mg capsule, Take 300 mg by mouth Three times a day, Disp: , Rfl:     levothyroxine 50 mcg tablet, Take 75 mcg by mouth daily , Disp: , Rfl:     lidocaine (XYLOCAINE) 2 % topical gel, Apply topically 3 (three) times a day as needed for mild pain or moderate pain (Vulvodynia), Disp: 30 mL, Rfl: 3    phenazopyridine (PYRIDIUM) 200 mg tablet, TAKE 1 TABLET (200 MG TOTAL) BY MOUTH 3 (THREE) TIMES A DAY AS NEEDED FOR BLADDER SPASMS, Disp: 20 tablet, Rfl: 0    Piggott Sleepiness Scale  Sitting and reading: Moderate chance of dozing  Watching TV: Moderate chance of dozing  Sitting, inactive in a public place (e g  a theatre or a meeting): Slight chance of dozing  As a passenger in a car for an hour without a break: Moderate chance of dozing  Lying down to rest in the afternoon when circumstances permit: High chance of dozing  Sitting and talking to someone: Would never doze  Sitting quietly after a lunch without alcohol: Would never doze  In a car, while stopped for a few minutes in traffic: Would never doze  Total score: 10              Vitals:    03/31/21 0900   BP: 108/58   Height: 5' 1" (1 549 m)       Body mass index is 22 67 kg/m²  Neck Circumference: 13       EPWORTH SLEEPINESS SCORE  Total score: 10      Past History Since Last Sleep Center Visit:   She, her  and their cats were unable to tolerate the noise of the oxygen compressor, so she had to stop using it  They purchased the [employer] home oxygen system and she uses that at night  She would like the oxygen discontinued and requests that an order to discontinue be placed  She feels she sleeps better with the [employer]ogen and it is much quieter  She did follow through with evaluation with Dr Zena Cha, pulmonologist at [facility]  Nocturnal hypoxia was determined to be idiopathic with possible musculoskeletal etiology for hypoventilation or effects of medication  She takes 600mg of gabapentin at bedtime, which improves discomfort in her nerves of the pelvic floor that cause chronic burning and pain  She also has spondylosis in her cervical spine  The gabapentin helps her to sleep more restfully through the night and she feels much more refreshed when awakening  She takes it on an as needed basis, which can be nightly for a few weeks and then reduces to a few times a week  This is prescribed by pain management  She has noticed that it affects her memory, so she tries to only use as needed  She has also had trigger point therapy, which has helped  She develops thick mucous at times, which she attributes to the fibromyalgia syndrome  She has had allergy testing, which was reportedly negative  She also saw Dr Claudetta Allen and had a procedure that reduced the mucous temporarily  She has noticed an increase in mucous again and plans to follow up with ENT  The review of systems and following portions of the patient's history were reviewed and updated as appropriate: allergies, current medications, past family history, past medical history, past social history, past surgical history, and problem list         OBJECTIVE    Physical Exam:     General Appearance:   Alert, cooperative, no distress, appears stated age     Head:   Normocephalic, without obvious abnormality, atraumatic     Eyes:   PERRL, conjunctiva/corneas clear, EOM's intact          Nose:  Nares normal, septum midline, no drainage or sinus tenderness     Neck:      Throat:      Supple, symmetrical, trachea midline, no adenopathy;  Thyroid: No enlargement, tenderness or nodules; no carotid bruit or JVD      Lips, teeth and gums normal; tongue normal size and  shape and midline in position; mucosa moist with low-lying soft palatal tissue, uvula somewhat thick at the base but otherwise normal, tonsils not visualized, Mallampati class 3-4    Lungs:   Clear to auscultation bilaterally, respirations unlabored     Heart:   Regular rate and rhythm, S1 and S2 normal, no murmur, rub or gallop       Extremities:  Extremities normal, atraumatic, no cyanosis or edema       Skin:  Skin color, texture, turgor normal, no rashes or lesions       Neurologic:  No focal deficits noted       ASSESSMENT / PLAN    1  Upper airway resistance syndrome  Discontinue home oxygen   2  Apnea  Ambulatory referral to Sleep Medicine   3  Nocturnal hypoxia  Discontinue home oxygen         Counseling / Coordination of Care  Total clinic time spent today 25 minutes  Greater than 50% of total time was spent with the patient and / or family counseling and / or coordination of care  A description of the counseling / coordination of care:     Impressions, Diagnostic results, Prognosis, Instructions for management, Risks and benefits of treatment, Patient and family education, Risk factor reductions and Importance of compliance with treatment    Today, we discussed her current symptoms and use of Inogen home oxygen for nocturnal hypoxia  She may request a follow up EMEKA test with the use of Inogen from pulmonology for any adjustments of the therapy, as needed  An order to discontinue the home oxygen concentrator from [employer] was given, as requested  We discussed that her other health issues are likely contributing to the light sleep she experiences, which leads to some daytime sleepiness  She was encouraged to continue to follow up with pain management, ENT and GI to optimize treatments  She was encouraged to continue exercise to tolerance, which can improve her lung capacity and improve her sleep  She will follow up on an as needed basis only  The following instructions have been given to the patient today:    Patient Instructions   1  Continue follow up with pain management regarding gabapentin  2  Continue to see pulmonology for other hypoxia issues  3   Follow up in Sleep medicine on an as needed basis only  Nursing Support:  When: Monday through Friday 7A-5PM except holidays  Where: Our direct line is [phone]  If you are having a true emergency please call 911  In the event that the line is busy or it is after hours please leave a voice message and we will return your call  Please speak clearly, leaving your full name, birth date, best number to reach you and the reason for your call  Medication refills: We will need the name of the medication, the dosage, the ordering provider, whether you get a 30 or 90 day refill, and the pharmacy name and address  Medications will be ordered by the provider only  Nurses cannot call in prescriptions  Please allow 7 days for medication refills  Physician requested updates: If your provider requested that you call with an update after starting medication, please be ready to provide us the medication and dosage, what time you take your medication, the time you attempt to fall asleep, time you fall asleep, when you wake up, and what time you get out of bed  Sleep Study Results: We will contact you with sleep study results and/or next steps after the physician has reviewed your testing        Sushma Lal, [address] [place] [address]

## 2021-03-31 NOTE — PATIENT INSTRUCTIONS
1   Continue follow up with pain management regarding gabapentin  2  Continue to see pulmonology for other hypoxia issues  3   Follow up in Sleep medicine on an as needed basis only  Nursing Support:  When: Monday through Friday 7A-5PM except holidays  Where: Our direct line is [phone]  If you are having a true emergency please call 911  In the event that the line is busy or it is after hours please leave a voice message and we will return your call  Please speak clearly, leaving your full name, birth date, best number to reach you and the reason for your call  Medication refills: We will need the name of the medication, the dosage, the ordering provider, whether you get a 30 or 90 day refill, and the pharmacy name and address  Medications will be ordered by the provider only  Nurses cannot call in prescriptions  Please allow 7 days for medication refills  Physician requested updates: If your provider requested that you call with an update after starting medication, please be ready to provide us the medication and dosage, what time you take your medication, the time you attempt to fall asleep, time you fall asleep, when you wake up, and what time you get out of bed  Sleep Study Results: We will contact you with sleep study results and/or next steps after the physician has reviewed your testing

## 2021-04-01 ENCOUNTER — TELEPHONE (OUTPATIENT)
Dept: SLEEP CENTER | Facility: CLINIC | Age: 76
End: 2021-04-01

## 2021-04-14 NOTE — TELEPHONE ENCOUNTER
Received phone call from [facility] at [facility] regarding pt's Oxygen order  Advised her that a discontinue order was faxed on 4/1/21  She never received it  Order re-faxed to [facility] at [phone]

## 2021-05-18 NOTE — PROGRESS NOTES
Patient has been messaging  Through the portal - she would like her generator and leads removed  I have messaged my schedule or to arrange this and the patient would also like cystoscopy at the time  We could do flexible cystoscopy at the time of the operation  Request history and physical to be done by [facility]  Prior

## 2021-07-16 ENCOUNTER — PATIENT MESSAGE (OUTPATIENT)
Dept: GASTROENTEROLOGY | Facility: CLINIC | Age: 76
End: 2021-07-16

## 2021-07-16 ENCOUNTER — OFFICE VISIT (OUTPATIENT)
Dept: GASTROENTEROLOGY | Facility: CLINIC | Age: 76
End: 2021-07-16
Payer: MEDICARE

## 2021-07-16 VITALS
DIASTOLIC BLOOD PRESSURE: 60 MMHG | SYSTOLIC BLOOD PRESSURE: 106 MMHG | BODY MASS INDEX: 21.11 KG/M2 | WEIGHT: 111.8 LBS | TEMPERATURE: 98.1 F | HEIGHT: 61 IN

## 2021-07-16 DIAGNOSIS — K58.1 IRRITABLE BOWEL SYNDROME WITH CONSTIPATION: ICD-10-CM

## 2021-07-16 DIAGNOSIS — R13.19 ESOPHAGEAL DYSPHAGIA: Primary | ICD-10-CM

## 2021-07-16 DIAGNOSIS — R14.0 ABDOMINAL BLOATING: ICD-10-CM

## 2021-07-16 DIAGNOSIS — R11.0 NAUSEA: Primary | ICD-10-CM

## 2021-07-16 PROCEDURE — 99214 OFFICE O/P EST MOD 30 MIN: CPT | Performed by: INTERNAL MEDICINE

## 2021-07-16 RX ORDER — PANTOPRAZOLE SODIUM 40 MG/1
40 TABLET, DELAYED RELEASE ORAL DAILY
Qty: 90 TABLET | Refills: 3 | Status: SHIPPED | OUTPATIENT
Start: 2021-07-16 | End: 2021-09-14 | Stop reason: SDUPTHER

## 2021-07-16 RX ORDER — ONDANSETRON 4 MG/1
4 TABLET, FILM COATED ORAL EVERY 8 HOURS PRN
Qty: 20 TABLET | Refills: 0 | Status: SHIPPED | OUTPATIENT
Start: 2021-07-16 | End: 2021-09-14 | Stop reason: SDUPTHER

## 2021-07-16 NOTE — PROGRESS NOTES
[facility] - Outpatient Consultation  Kylee Rockwell 70 y o  adult MRN: [id number]  Encounter: [id number]      PCP: Tammy King DO  Referring: No referring provider defined for this encounter  ASSESSMENT AND PLAN:      1  Esophageal dysphagia  With history of erosive disease, restart Protonix  - EGD; Future, differential includes Schatzki's ring versus esophageal stricture versus erosive gastritis versus functional etiology  - Espoh manometry/24hr ph; Future, evaluate and rule out achalasia or other  - pantoprazole (PROTONIX) 40 mg tablet; Take 1 tablet (40 mg total) by mouth daily  Dispense: 90 tablet; Refill: 3    2  Irritable bowel syndrome with constipation  3  Abdominal bloating  - using senna prn  - recommend restart miralax with titration  - using pert gum, which she feels is helpful      ______________________________________________________________________    CC:  Chief Complaint   Patient presents with    Follow-up    Dysphagia     Getting worse       HPI:      Patient is a 70year old female seen in follow up for dysphagia, also with IBS-C, pelvic floor dysfunction  She reports worsening dysphagia over the last six months  She feels a globus sensation and stricture in her midesophagus  She has felt foods such as frozen berries, lentils, soft gel pills get stuck and eventually passed  She describes epigastric abdominal pain over the last several months after meals  This was improved by taking a two week course of 40 mg of Protonix, which she has been self discontinued  She has lost 12 lb over the last six months, unintentionally  She is concerned about a cholesterol medication contributing to her symptom  She is also found to be hyperthyroid  She also describes increased mucus after meals which she is concerned is related to acid  REVIEW OF SYSTEMS:    CONSTITUTIONAL: Denies any fever, chills, rigors, and weight loss  HEENT: No earache or tinnitus  Denies hearing loss or visual disturbances  CARDIOVASCULAR: No chest pain or palpitations  RESPIRATORY: Denies any cough, hemoptysis, shortness of breath or dyspnea on exertion  GASTROINTESTINAL: As noted in the History of Present Illness  GENITOURINARY: No problems with urination  Denies any hematuria or dysuria  NEUROLOGIC: No dizziness or vertigo, denies headaches  MUSCULOSKELETAL: Denies any muscle or joint pain  SKIN: Denies skin rashes or itching  ENDOCRINE: Denies excessive thirst  Denies intolerance to heat or cold  PSYCHOSOCIAL: Denies depression or anxiety  Denies any recent memory loss  Historical Information   Past Medical History:   Diagnosis Date    Allergy to adhesive tape     tears skin    Arthritis     Back pain     Cataract     Fecal smearing     Fibromyalgia, primary     Frequent urination     Hypercholesteremia     Hyperlipidemia     Hypothyroid     Hypothyroidism     Interstitial cystitis     has interStim    Interstitial cystitis     Irritable bowel syndrome     Kidney stone     Microscopic hematuria     Muscle spasm     Myofascial pain syndrome     Osteoarthritis     Osteoporosis     Other allergy, initial encounter     allergy to blue dye    Pelvic floor dysfunction     Pelvic pain     PONV (postoperative nausea and vomiting)     and pruritis    Rosacea     Seasonal allergies     Shingles     Sjogren's disease ([facility] [address] )     Skin cancer     left shoulder    Spondylisthesis     Urinary urgency     Wears dentures     lower partial    Wears glasses      Past Surgical History:   Procedure Laterality Date    ADENOIDECTOMY      APPENDECTOMY      BREAST EXCISIONAL BIOPSY Left 1990    bengin    CATARACT EXTRACTION Bilateral     COLONOSCOPY      ESOPHAGOGASTRODUODENOSCOPY N/A 5/25/2018    Procedure: ESOPHAGOGASTRODUODENOSCOPY (EGD); Surgeon: Merlene Brown MD;  Location: [facility];   Service: Gastroenterology    HYSTERECTOMY  1971    IR CEREBRAL ANGIOGRAPHY  10/18/2019    OTHER SURGICAL HISTORY Left     limb lengthening    OTHER SURGICAL HISTORY Bilateral 2012, 2014, 2017    interStim    OTHER SURGICAL HISTORY      tendon contracture right hip    WI CYSTOURETHROSCOPY N/A 10/4/2017    Procedure: CYSTOSCOPY;  Surgeon: Elyssa Newton MD;  Location: [facility];  Service: Urology    SALPINGOOPHORECTOMY  2002    SEPTOPLASTY  1980    due to fracture  with repair    TONSILLECTOMY       Social History   Social History     Substance and Sexual Activity   Alcohol Use Not Currently    Comment: few x year     Social History     Substance and Sexual Activity   Drug Use No     Social History     Tobacco Use   Smoking Status Never Smoker   Smokeless Tobacco Never Used     Family History   Problem Relation Age of Onset    Cancer Father         Oral cancer    Cancer Brother     Endometrial cancer Mother 28    No Known Problems Sister     No Known Problems Maternal Grandmother     No Known Problems Maternal Grandfather     No Known Problems Paternal Grandmother     No Known Problems Paternal Grandfather     No Known Problems Paternal Aunt        Meds/Allergies       Current Outpatient Medications:     aspirin (ECOTRIN LOW STRENGTH) 81 mg EC tablet    Bempedoic Acid (Nexletol) 180 MG TABS    diclofenac sodium (VOLTAREN) 1 %    gabapentin (NEURONTIN) 300 mg capsule    levothyroxine 50 mcg tablet    lidocaine (XYLOCAINE) 2 % topical gel    phenazopyridine (PYRIDIUM) 200 mg tablet    pantoprazole (PROTONIX) 40 mg tablet    Allergies   Allergen Reactions    Other      Adhesive tape causes blisters    Sulfa Antibiotics Rash    Atorvastatin      Muscle aches       Blue Dyes (Parenteral) - Food Allergy Hives    Macrobid [Nitrofurantoin]     Medical Tape      blisters  blisters    Sulfate            Objective     Blood pressure 106/60, temperature 98 1 °F (36 7 °C), temperature source Tympanic, height 5' 1" (1 549 m), weight 50 7 kg (111 lb 12 8 oz), not currently breastfeeding  Body mass index is 21 12 kg/m²  PHYSICAL EXAM:      General Appearance:   Alert, cooperative, no distress   HEENT:   Normocephalic, atraumatic, anicteric  Neck:  Supple, symmetrical, trachea midline   Lungs:   Clear to auscultation bilaterally; no rales, rhonchi or wheezing; respirations unlabored    Heart[de-identified]   Regular rate and rhythm; no murmur, rub, or gallop  Abdomen:   Soft, +RUQ, epigastric tenderness to palpation, non-distended; normal bowel sounds; no masses, no organomegaly    Genitalia:   Deferred    Rectal:   Deferred    Extremities:  No cyanosis, clubbing or edema    Pulses:  2+ and symmetric    Skin:  No jaundice, rashes, or lesions    Lymph nodes:  No palpable cervical lymphadenopathy        Lab Results:     Lab Results   Component Value Date    WBC 6 67 09/18/2017    HGB 13 6 09/18/2017    HCT 41 2 09/18/2017    MCV 97 09/18/2017     09/18/2017       Lab Results   Component Value Date    K 3 7 09/18/2017     09/18/2017    CO2 31 09/18/2017    BUN 17 07/18/2019    CREATININE 0 54 (L) 07/18/2019    CALCIUM 9 4 09/18/2017    AST 23 09/18/2017    ALT 26 09/18/2017    ALKPHOS 47 09/18/2017    EGFR 95 07/18/2019       No results found for: INR, PROTIME      Radiology Results:   No results found  Portions of the record may have been created with voice recognition software  Occasional wrong word or "sound a like" substitutions may have occurred due to the inherent limitations of voice recognition software  Read the chart carefully and recognize, using context, where substitutions have occurred

## 2021-07-23 ENCOUNTER — TELEPHONE (OUTPATIENT)
Dept: PREADMISSION TESTING | Facility: HOSPITAL | Age: 76
End: 2021-07-23

## 2021-08-04 ENCOUNTER — TELEPHONE (OUTPATIENT)
Dept: GASTROENTEROLOGY | Facility: HOSPITAL | Age: 76
End: 2021-08-04

## 2021-08-09 ENCOUNTER — TELEPHONE (OUTPATIENT)
Dept: GASTROENTEROLOGY | Facility: HOSPITAL | Age: 76
End: 2021-08-09

## 2021-08-10 ENCOUNTER — ANESTHESIA EVENT (OUTPATIENT)
Dept: GASTROENTEROLOGY | Facility: HOSPITAL | Age: 76
End: 2021-08-10

## 2021-08-10 ENCOUNTER — ANESTHESIA (OUTPATIENT)
Dept: GASTROENTEROLOGY | Facility: HOSPITAL | Age: 76
End: 2021-08-10

## 2021-08-10 ENCOUNTER — HOSPITAL ENCOUNTER (OUTPATIENT)
Dept: GASTROENTEROLOGY | Facility: HOSPITAL | Age: 76
Setting detail: OUTPATIENT SURGERY
Discharge: HOME | End: 2021-08-10
Attending: INTERNAL MEDICINE | Admitting: INTERNAL MEDICINE
Payer: MEDICARE

## 2021-08-10 VITALS
DIASTOLIC BLOOD PRESSURE: 72 MMHG | HEIGHT: 61 IN | RESPIRATION RATE: 16 BRPM | OXYGEN SATURATION: 94 % | TEMPERATURE: 97.8 F | BODY MASS INDEX: 21.03 KG/M2 | HEART RATE: 77 BPM | SYSTOLIC BLOOD PRESSURE: 116 MMHG | WEIGHT: 111.4 LBS

## 2021-08-10 DIAGNOSIS — R13.19 ESOPHAGEAL DYSPHAGIA: ICD-10-CM

## 2021-08-10 PROBLEM — R11.2 PONV (POSTOPERATIVE NAUSEA AND VOMITING): Status: ACTIVE | Noted: 2021-08-10

## 2021-08-10 PROBLEM — Z98.890 PONV (POSTOPERATIVE NAUSEA AND VOMITING): Status: ACTIVE | Noted: 2021-08-10

## 2021-08-10 PROCEDURE — 88305 TISSUE EXAM BY PATHOLOGIST: CPT | Performed by: PATHOLOGY

## 2021-08-10 PROCEDURE — 43239 EGD BIOPSY SINGLE/MULTIPLE: CPT | Performed by: INTERNAL MEDICINE

## 2021-08-10 RX ORDER — SODIUM CHLORIDE 9 MG/ML
125 INJECTION, SOLUTION INTRAVENOUS CONTINUOUS
Status: DISCONTINUED | OUTPATIENT
Start: 2021-08-10 | End: 2021-08-14 | Stop reason: HOSPADM

## 2021-08-10 RX ORDER — NORTRIPTYLINE HYDROCHLORIDE 10 MG/1
10 CAPSULE ORAL
COMMUNITY

## 2021-08-10 RX ORDER — ONDANSETRON 2 MG/ML
INJECTION INTRAMUSCULAR; INTRAVENOUS AS NEEDED
Status: DISCONTINUED | OUTPATIENT
Start: 2021-08-10 | End: 2021-08-10

## 2021-08-10 RX ORDER — LIDOCAINE HYDROCHLORIDE 10 MG/ML
INJECTION, SOLUTION EPIDURAL; INFILTRATION; INTRACAUDAL; PERINEURAL AS NEEDED
Status: DISCONTINUED | OUTPATIENT
Start: 2021-08-10 | End: 2021-08-10

## 2021-08-10 RX ORDER — PROPOFOL 10 MG/ML
INJECTION, EMULSION INTRAVENOUS AS NEEDED
Status: DISCONTINUED | OUTPATIENT
Start: 2021-08-10 | End: 2021-08-10

## 2021-08-10 RX ORDER — FAMOTIDINE 20 MG/1
20 TABLET, FILM COATED ORAL ONCE
COMMUNITY

## 2021-08-10 RX ADMIN — ONDANSETRON 4 MG: 2 INJECTION INTRAMUSCULAR; INTRAVENOUS at 10:53

## 2021-08-10 RX ADMIN — PROPOFOL 30 MG: 10 INJECTION, EMULSION INTRAVENOUS at 11:04

## 2021-08-10 RX ADMIN — PROPOFOL 50 MG: 10 INJECTION, EMULSION INTRAVENOUS at 11:02

## 2021-08-10 RX ADMIN — SODIUM CHLORIDE 125 ML/HR: 0.9 INJECTION, SOLUTION INTRAVENOUS at 09:04

## 2021-08-10 RX ADMIN — LIDOCAINE HYDROCHLORIDE 50 MG: 10 INJECTION, SOLUTION EPIDURAL; INFILTRATION; INTRACAUDAL; PERINEURAL at 10:59

## 2021-08-10 RX ADMIN — PROPOFOL 120 MG: 10 INJECTION, EMULSION INTRAVENOUS at 10:59

## 2021-08-10 NOTE — H&P
H&P EXAM - Outpatient Endoscopy   Mis Jacob 70 y o  adult MRN: [id number]    [address]   Encounter: [id number]        History and Physical - [facility]  Mis Jacob 70 y o  adult MRN: [id number]                  HPI: Mis Jacob is a 70y o  year old adult who presents for dysphagia, globus sensation, history of esophagitis and peptic duodenitis in 2018       REVIEW OF SYSTEMS: Per the HPI, and otherwise unremarkable  Historical Information   Past Medical History:   Diagnosis Date    Allergy to adhesive tape     tears skin    Arthritis     Back pain     Cataract     Fecal smearing     Fibromyalgia, primary     Frequent urination     Hypercholesteremia     Hyperlipidemia     Hypothyroid     Hypothyroidism     Interstitial cystitis     has interStim    Interstitial cystitis     Irritable bowel syndrome     Kidney stone     Microscopic hematuria     Muscle spasm     Myofascial pain syndrome     Osteoarthritis     Osteoporosis     Other allergy, initial encounter     allergy to blue dye    Pelvic floor dysfunction     Pelvic pain     PONV (postoperative nausea and vomiting)     and pruritis    Rosacea     Seasonal allergies     Shingles     Sjogren's disease ([address] )     Skin cancer     left shoulder    Spondylisthesis     Urinary urgency     Wears dentures     lower partial    Wears glasses      Past Surgical History:   Procedure Laterality Date    ADENOIDECTOMY      APPENDECTOMY      BREAST EXCISIONAL BIOPSY Left 1990    bengin    CATARACT EXTRACTION Bilateral     COLONOSCOPY      ESOPHAGOGASTRODUODENOSCOPY N/A 5/25/2018    Procedure: ESOPHAGOGASTRODUODENOSCOPY (EGD); Surgeon: Majo Sullivan MD;  Location: [facility];   Service: Gastroenterology    HYSTERECTOMY  1971    IR CEREBRAL ANGIOGRAPHY  10/18/2019    OTHER SURGICAL HISTORY Left     limb lengthening    OTHER SURGICAL HISTORY Bilateral 2012, 2014, 2017    interStim    OTHER SURGICAL HISTORY      tendon contracture right hip    [place] CYSTOURETHROSCOPY N/A 10/4/2017    Procedure: CYSTOSCOPY;  Surgeon: Clemon Canavan, MD;  Location: [facility];  Service: Urology    SALPINGOOPHORECTOMY  2002    SEPTOPLASTY  1980    due to fracture  with repair    TONSILLECTOMY       Social History   Social History     Substance and Sexual Activity   Alcohol Use Not Currently    Comment: few x year     Social History     Substance and Sexual Activity   Drug Use No     Social History     Tobacco Use   Smoking Status Never Smoker   Smokeless Tobacco Never Used     Family History   Problem Relation Age of Onset    Cancer Father         Oral cancer    Cancer Brother     Endometrial cancer Mother 28    No Known Problems Sister     No Known Problems Maternal Grandmother     No Known Problems Maternal Grandfather     No Known Problems Paternal Grandmother     No Known Problems Paternal Grandfather     No Known Problems Paternal Aunt        Meds/Allergies     (Not in a hospital admission)      Allergies   Allergen Reactions    Other      Adhesive tape causes blisters    Sulfa Antibiotics Rash    Atorvastatin      Muscle aches   Blue Dyes (Parenteral) - Food Allergy Hives    Macrobid [Nitrofurantoin]     Medical Tape      blisters  blisters    Sulfate        Objective     /66   Pulse (!) 106   Temp 98 8 °F (37 1 °C) (Temporal)   Resp 22   Ht 5' 1" (1 549 m)   Wt 50 5 kg (111 lb 6 4 oz)   SpO2 94%   BMI 21 05 kg/m²       PHYSICAL EXAM    Gen: NAD  CV: RRR  CHEST: Clear  ABD: soft, NT/ND  EXT: no edema      ASSESSMENT/PLAN:  This is a 70y o  year old adult here for egd, and she is stable and optimized for her procedure

## 2021-08-10 NOTE — ANESTHESIA PREPROCEDURE EVALUATION
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   (+) Stenosis of left internal carotid artery      GI/HEPATIC   (+) Esophageal dysphagia   (+) Gastroesophageal reflux disease without esophagitis      PULMONARY   (+) Idiopathic sleep related nonobstructive alveolar hypoventilation      Other   (+) Fecal incontinence   (+) Generalized abdominal pain   (+) IC (interstitial cystitis)   (+) Increased frequency of urination   (+) Nausea   (+) Nocturnal hypoxia   (+) Pulsatile tinnitus of both ears   (+) Snoring   (+) Upper airway resistance syndrome   (+) Urinary urgency        Physical Exam    Airway    Mallampati score: II  TM Distance: >3 FB  Neck ROM: full     Dental   No notable dental hx     Cardiovascular  Cardiovascular exam normal    Pulmonary  Pulmonary exam normal     Other Findings        Anesthesia Plan  ASA Score- 3     Anesthesia Type- IV sedation with anesthesia with ASA Monitors  Additional Monitors:   Airway Plan:     Comment: No labs  Plan Factors-Exercise tolerance (METS): >4 METS  Chart reviewed  Patient is not a current smoker  Patient not instructed to abstain from smoking on day of procedure  Patient did not smoke on day of surgery  Induction- intravenous  Postoperative Plan-     Informed Consent- Anesthetic plan and risks discussed with patient  I personally reviewed this patient with the CRNA  Discussed and agreed on the Anesthesia Plan with the CRNA  Tino Sanz

## 2021-08-10 NOTE — DISCHARGE INSTRUCTIONS
Upper Endoscopy   WHAT YOU NEED TO KNOW:   An upper endoscopy is also called an upper gastrointestinal (GI) endoscopy, or an esophagogastroduodenoscopy (EGD)  You may feel bloated, gassy, or have some abdominal discomfort after your procedure  Your throat may be sore for 24 to 36 hours  You may burp or pass gas from air that is still inside your body  DISCHARGE INSTRUCTIONS:   Call 911 if:   · You have sudden chest pain or trouble breathing  Seek care immediately if:   · You feel dizzy or faint  · You have trouble swallowing  · You have severe throat pain  · Your bowel movements are very dark or black  · Your abdomen is hard and firm and you have severe pain  · You vomit blood  Contact your healthcare provider if:   · You feel full or bloated and cannot burp or pass gas  · You have not had a bowel movement for 3 days after your procedure  · You have neck pain  · You have a fever or chills  · You have nausea or are vomiting  · You have a rash or hives  · You have questions or concerns about your endoscopy  Relieve a sore throat:  Suck on throat lozenges or crushed ice  Gargle with a small amount of warm salt water  Mix 1 teaspoon of salt and 1 cup of warm water to make salt water  Relieve gas and discomfort from bloating:  Lie on your right side with a heating pad on your abdomen  Take short walks to help pass gas  Eat small meals until bloating is relieved  Rest after your procedure:  Do not drive or make important decisions until the day after your procedure  Return to your normal activity as directed  You can usually return to work the day after your procedure  Follow up with your healthcare provider as directed:  Write down your questions so you remember to ask them during your visits  © Copyright [employer] 2021 Information is for End User's use only and may not be sold, redistributed or otherwise used for commercial purposes   All illustrations and images included in CareNotes® are the copyrighted property of A D [employer] M [employer]  or Manohar Guajardo   The above information is an  only  It is not intended as medical advice for individual conditions or treatments  Talk to your doctor, nurse or pharmacist before following any medical regimen to see if it is safe and effective for you

## 2021-08-10 NOTE — ANESTHESIA POSTPROCEDURE EVALUATION
Post-Op Assessment Note    CV Status:  Stable  Pain Score: 0    Pain management: adequate     Mental Status:  Alert and awake   Hydration Status:  Euvolemic   PONV Controlled:  Controlled   Airway Patency:  Patent      Post Op Vitals Reviewed: Yes      Staff: CRNA         No complications documented      BP   132/65   Temp      Pulse 84   Resp 16   SpO2 97

## 2021-08-15 NOTE — RESULT ENCOUNTER NOTE
My medical assistant will call patient with results       Biopsies from the upper endoscopy are unremarkableShe should schedule the previously ordered ph/manometry testing  She is scheduled for follow up with Dr Michael Jarvis in October

## 2021-09-14 DIAGNOSIS — R13.19 ESOPHAGEAL DYSPHAGIA: ICD-10-CM

## 2021-09-14 DIAGNOSIS — R11.0 NAUSEA: ICD-10-CM

## 2021-09-14 RX ORDER — ONDANSETRON 4 MG/1
4 TABLET, FILM COATED ORAL EVERY 8 HOURS PRN
Qty: 90 TABLET | Refills: 1 | Status: SHIPPED | OUTPATIENT
Start: 2021-09-14

## 2021-09-14 RX ORDER — PANTOPRAZOLE SODIUM 40 MG/1
40 TABLET, DELAYED RELEASE ORAL DAILY
Qty: 90 TABLET | Refills: 3 | Status: SHIPPED | OUTPATIENT
Start: 2021-09-14

## 2021-10-19 ENCOUNTER — OFFICE VISIT (OUTPATIENT)
Dept: GASTROENTEROLOGY | Facility: CLINIC | Age: 76
End: 2021-10-19
Payer: MEDICARE

## 2021-10-19 VITALS
SYSTOLIC BLOOD PRESSURE: 112 MMHG | HEIGHT: 61 IN | OXYGEN SATURATION: 98 % | WEIGHT: 109 LBS | BODY MASS INDEX: 20.58 KG/M2 | DIASTOLIC BLOOD PRESSURE: 60 MMHG | HEART RATE: 80 BPM | TEMPERATURE: 97.5 F

## 2021-10-19 DIAGNOSIS — K58.1 IRRITABLE BOWEL SYNDROME WITH CONSTIPATION: Primary | ICD-10-CM

## 2021-10-19 DIAGNOSIS — R14.0 ABDOMINAL BLOATING: ICD-10-CM

## 2021-10-19 DIAGNOSIS — R11.0 NAUSEA: ICD-10-CM

## 2021-10-19 DIAGNOSIS — R13.19 ESOPHAGEAL DYSPHAGIA: ICD-10-CM

## 2021-10-19 PROCEDURE — 99214 OFFICE O/P EST MOD 30 MIN: CPT | Performed by: INTERNAL MEDICINE

## 2021-10-19 RX ORDER — PREDNISOLONE ACETATE 10 MG/ML
SUSPENSION/ DROPS OPHTHALMIC
COMMUNITY
Start: 2021-10-05

## 2021-10-19 RX ORDER — GABAPENTIN 300 MG/1
CAPSULE ORAL
COMMUNITY
Start: 2021-10-13

## 2021-12-17 ENCOUNTER — HOSPITAL ENCOUNTER (OUTPATIENT)
Dept: MAMMOGRAPHY | Facility: MEDICAL CENTER | Age: 76
Discharge: HOME | End: 2021-12-17
Payer: MEDICARE

## 2021-12-17 VITALS — BODY MASS INDEX: 21.14 KG/M2 | WEIGHT: 112 LBS | HEIGHT: 61 IN

## 2021-12-17 DIAGNOSIS — Z12.31 ENCOUNTER FOR SCREENING MAMMOGRAM FOR MALIGNANT NEOPLASM OF BREAST: ICD-10-CM

## 2021-12-17 PROCEDURE — 77067 SCR MAMMO BI INCL CAD: CPT

## 2021-12-17 PROCEDURE — 77063 BREAST TOMOSYNTHESIS BI: CPT

## 2021-12-29 NOTE — NURSING NOTE
Attempted to contact patient to discuss upcoming left shoulder CT arthrogram at [facility]  Message left

## 2021-12-29 NOTE — NURSING NOTE
Received a call from patient to discuss upcoming left shoulder CT arthrogram at [address] [place] [address] Radiology  Allergies reviewed  Patient reports she has interstitial cystitis and the contrast tends to irritate the bladder  Patient states she spoke to the ordering provider about taking pyridium prior to her procedure  and verified patient does not currently take any anticoagulant medications  Patient states she is no longer taking ASA  Pre procedure instructions including diet and taking own medications discussed with patient  Patient instructed that she may eat normally and take medications as usual before the procedure  Procedure and post procedure expectations and instructions reviewed with the patient  Patient verbalizes understanding and denies any questions at this time  Reminded of the location, date and time of the expected procedure  Name and contact number provided in case patient has any further questions

## 2022-01-05 ENCOUNTER — HOSPITAL ENCOUNTER (OUTPATIENT)
Dept: CT IMAGING | Facility: HOSPITAL | Age: 77
Discharge: HOME | End: 2022-01-05
Payer: MEDICARE

## 2022-01-05 ENCOUNTER — HOSPITAL ENCOUNTER (OUTPATIENT)
Dept: RADIOLOGY | Facility: HOSPITAL | Age: 77
Discharge: HOME | End: 2022-01-05
Payer: MEDICARE

## 2022-01-05 DIAGNOSIS — M25.512 PAIN IN LEFT SHOULDER: ICD-10-CM

## 2022-01-05 PROCEDURE — 77002 NEEDLE LOCALIZATION BY XRAY: CPT

## 2022-01-05 PROCEDURE — G1004 CDSM NDSC: HCPCS

## 2022-01-05 PROCEDURE — 23350 INJECTION FOR SHOULDER X-RAY: CPT

## 2022-01-05 PROCEDURE — 73201 CT UPPER EXTREMITY W/DYE: CPT

## 2022-01-05 RX ORDER — LIDOCAINE HYDROCHLORIDE 10 MG/ML
10 INJECTION, SOLUTION EPIDURAL; INFILTRATION; INTRACAUDAL; PERINEURAL ONCE
Status: COMPLETED | OUTPATIENT
Start: 2022-01-05 | End: 2022-01-05

## 2022-01-05 RX ADMIN — LIDOCAINE HYDROCHLORIDE 10 ML: 10 INJECTION, SOLUTION EPIDURAL; INFILTRATION; INTRACAUDAL at 10:40

## 2022-01-05 RX ADMIN — IOHEXOL 10 ML: 300 INJECTION, SOLUTION INTRAVENOUS at 10:57

## 2022-01-14 ENCOUNTER — HOSPITAL ENCOUNTER (OUTPATIENT)
Dept: MAMMOGRAPHY | Facility: CLINIC | Age: 77
Discharge: HOME | End: 2022-01-14
Payer: MEDICARE

## 2022-01-14 ENCOUNTER — HOSPITAL ENCOUNTER (OUTPATIENT)
Dept: ULTRASOUND IMAGING | Facility: CLINIC | Age: 77
Discharge: HOME | End: 2022-01-14
Payer: MEDICARE

## 2022-01-14 VITALS — BODY MASS INDEX: 21.14 KG/M2 | WEIGHT: 112 LBS | HEIGHT: 61 IN

## 2022-01-14 DIAGNOSIS — R92.8 ABNORMAL MAMMOGRAM: ICD-10-CM

## 2022-01-14 PROCEDURE — 76642 ULTRASOUND BREAST LIMITED: CPT

## 2022-01-14 PROCEDURE — 77065 DX MAMMO INCL CAD UNI: CPT

## 2022-01-14 PROCEDURE — G0279 TOMOSYNTHESIS, MAMMO: HCPCS

## 2022-03-01 NOTE — PROGRESS NOTES
[address] [facility]  [facility]  [address] [facility]  [place], [address] [facility]  [phone]  www  [facility]  org      NAME: Sloane Lee  AGE: 72 y o  SEX: female  : 1945   MRN: [id number]    DATE: 2019  TIME: 11:20 AM    Assessment and Plan:    Interstitial cystitis:  In remission at this time, off Elmiron  We will treat episodes as they arise  Urgency and frequency:  The InterStim has been off for a couple of months  She can restart this at her convenience and according to her symptoms  Currently she is having a lot of problems with pelvic pain due to post herpetic neuralgia and would probably best to keep it off for a while longer  Reassess in 1 year  Chief Complaint   No chief complaint on file  History of Present Illness   Interstitial cystitis:  Stopped Jean-Claude on last year  Had shingles recently, on gabapentin  No recent flare-ups  Doing well off the Elmiron  Urgency and frequency with presence of InterStim generator:As before , has good days and bad days, and occasionally has a twisting and pulling in her vulvar region  She has to turn the generator off due to the pain and I told her this was okay to do and perhaps to even keep it off for a month or so while she heals up from her shingles episode        The following portions of the patient's history were reviewed and updated as appropriate: allergies, current medications, past family history, past medical history, past social history, past surgical history and problem list   Past Medical History:   Diagnosis Date    Allergy to adhesive tape     tears skin    Arthritis     Back pain     Cataract     Fecal smearing     Fibromyalgia, primary     Frequent urination     Hypercholesteremia     Hyperlipidemia     Hypothyroid     Hypothyroidism     Interstitial cystitis     has interStim    Interstitial cystitis     Irritable bowel syndrome     Kidney Admitting Diagnosis:   Patient is a 59y old  Female who presents with a chief complaint of ICH (01 Mar 2022 04:28)    PAST MEDICAL & SURGICAL HISTORY:  Hypertension  Pre-diabetes    Current Nutrition Order:  CST CHO diet with evening snack     PO Intake: Good (%) [   ]  Fair (50-75%) [   ] Poor (<25%) [   ]-     GI Issues:   WDL, no n/v/d/c  No abd distention noted    Pain:  No pain noted at this time    Skin Integrity:  Surgical incision, pal score 18.   No edema present  No pressure ulcers noted    Labs:   03-01    139  |  107  |  7   ----------------------------<  114<H>  3.5   |  23  |  0.36<L>    Ca    9.0      01 Mar 2022 05:03  Phos  3.1     03-01  Mg     2.0     03-01    Medications:  MEDICATIONS  (STANDING):  amLODIPine   Tablet 10 milliGRAM(s) Oral daily  bisacodyl 5 milliGRAM(s) Oral every 12 hours  enoxaparin Injectable 40 milliGRAM(s) SubCutaneous at bedtime  fludroCORTISONE 0.2 milliGRAM(s) Oral every 12 hours  hydrALAZINE 100 milliGRAM(s) Oral every 8 hours  influenza   Vaccine 0.5 milliLiter(s) IntraMuscular once  labetalol 100 milliGRAM(s) Oral every 8 hours  lactobacillus acidophilus 1 Tablet(s) Oral daily  losartan 100 milliGRAM(s) Oral daily  polyethylene glycol 3350 17 Gram(s) Oral every 12 hours  potassium chloride    Tablet ER 40 milliEquivalent(s) Oral every 4 hours  senna 2 Tablet(s) Oral at bedtime  sodium chloride 3 Gram(s) Oral every 6 hours    MEDICATIONS  (PRN):  acetaminophen     Tablet .. 650 milliGRAM(s) Oral every 6 hours PRN Temp greater or equal to 38C (100.4F), Mild Pain (1 - 3)  albuterol/ipratropium for Nebulization 3 milliLiter(s) Nebulizer every 6 hours PRN Shortness of Breath and/or Wheezing  ibuprofen  Tablet. 400 milliGRAM(s) Oral every 8 hours PRN Temp greater or equal to 38C (100.4F), Moderate Pain (4 - 6)  traMADol 50 milliGRAM(s) Oral every 6 hours PRN Severe Pain (7 - 10)    Admitting Anthropometrics:  · Height for BMI (FEET)	5 Feet  · Height for BMI (INCHES)	1 Inch(s)  · Height for BMI (CENTIMETERS)	154.94 Centimeter(s)  · Weight for BMI (lbs)	167 lb  · Weight for BMI (kg)	75.7 kg  · Body Mass Index	31.5   · Ideal Body Weight (lbs)	105   · Ideal Body Weight (kg)	47.6     Weight:  2/12 167lbs  2/18 160lbs  2/25 167.5lbs    Weight Change: Pt weight has fluctuated during this admission, likely 2/2 changes in PO intake. Please cont to trend weight weekly.     Nutrition Focused Physical Exam: Completed [   ]  Not Pertinent [ x  ]  Pt meets ASPEN guidelines for Moderate Malnutrition based on two criteria. Please see subjective from 2/18 for further details.     Estimated energy needs:   Ideal body weight used for calculations as pt >120% of IBW (%IBW: 159). Adjusted for age, slight increase PRO for, wound healing, suspected malnutrition.   Kcal (25-30 kcal/kg): 2225-1825 kcal  Protein (1.2-1.4 g/kg pro): 57-67g pro  Fluids (25-30 ml/kg): 0181-2456 ml    Subjective:   60 y/o female with PMHx of HTN, pre-DM presents [facility] after c/o severe headache and nausea being found down by son covered in vomit. Initial CTH revealed left thalamic parenchymal hematoma with intraventricular hemorrhage. ICH score 2. NIHSS 18. s/p R frontal large bore EVD placement 2/12. Now s/p R frontal EVD placement- dropped 2/13. Pt weaned and extubated 2/14. S/p formal SLP eval 2/16 with recs for NPO + NGT except for tsp of water pending reassessment. Follow Up FEES 2/17 with recs for minced and moist diet. EN d/c 02/17 once diet was advanced. Pt was planned for  shunt placement 2/25, but now rescheduled.     On assessment, pt resting in bed enjoying breakfast. Mental status noted to be significantly more improved from previous assessment. Currently on Regular diet with Ensure Enlive TID (1050 kcal, 60g protein, 540mL free H2O)- pt had consumed ~50% of breakfast this am, with plan to cont eating after assessment. No n/v/d/c. No abd distention or discomfort. Education provided on importance of adqaute PO intake with emphasis on lean protein- pt receptive. Please see nutr recs below. RD to follow.     Previous Nutrition Diagnosis: Moderate PCM RT suspected inadequate energy intake AEB meeting <75% est needs x1 week, -7lb/ 4% wt loss x1 week.     Active [ x  ]  Resolved [   ]    If resolved, new PES:     Goal/Expected Outcome Pt to meet >75% EER with good tolerance via tolerable route.    Recommendations:  1. Cont with regular diet as tolerated  >>Monitor need for follow up SLP assessment  >>Cont with Ensure Enlive TID (1050 kcal, 60g protein, 540mL free H2O)  2. Pain and bowel regimen per team   3. Cont to monitor lytes and replete prn   4. RD diet edu prn    Education: Deferred at this time.     Risk Level: High [   ] Moderate [ x  ] Low [   ]. stone     Microscopic hematuria     Muscle spasm     Myofascial pain syndrome     Osteoarthritis     Osteoporosis     Other allergy, initial encounter     allergy to blue dye    Pelvic floor dysfunction     Pelvic pain     PONV (postoperative nausea and vomiting)     and pruritis    Rosacea     Seasonal allergies     Shingles     Sjogren's disease ([facility] [place] [address] )     Skin cancer     left shoulder    Spondylisthesis     Urinary urgency     Wears dentures     lower partial    Wears glasses      Past Surgical History:   Procedure Laterality Date    ADENOIDECTOMY      APPENDECTOMY      CATARACT EXTRACTION Bilateral     COLONOSCOPY      ESOPHAGOGASTRODUODENOSCOPY N/A 5/25/2018    Procedure: ESOPHAGOGASTRODUODENOSCOPY (EGD); Surgeon: Ted Slater MD;  Location: [facility]; Service: Gastroenterology    HYSTERECTOMY  1971    OTHER SURGICAL HISTORY Left     limb lengthening    OTHER SURGICAL HISTORY Bilateral 2012, 2014, 2017    interStim    OTHER SURGICAL HISTORY      tendon contracture right hip    AK CYSTOURETHROSCOPY N/A 10/4/2017    Procedure: Burke Sam;  Surgeon: Nicholas Bueno MD;  Location: [facility] OR;  Service: Urology    SALPINGOOPHORECTOMY  2002    SEPTOPLASTY  1980    due to fracture  with repair    TONSILLECTOMY       shoulder  Review of Systems   Review of Systems    Active Problem List     Patient Active Problem List   Diagnosis    Fecal incontinence    IC (interstitial cystitis)    Increased frequency of urination    Urinary urgency    Difficult or painful urination    Esophageal dysphagia    Generalized abdominal pain    Gastroesophageal reflux disease without esophagitis    Nausea       Objective   /60   Pulse 63   Ht 5' (1 524 m)   Wt 54 kg (119 lb)   BMI 23 24 kg/m²     Physical Exam   Constitutional: She is oriented to person, place, and time  She appears well-developed and well-nourished  HENT:   Head: Normocephalic and atraumatic     Eyes: EOM are normal  Neck: Normal range of motion  Pulmonary/Chest: Effort normal    Musculoskeletal: Normal range of motion  Neurological: She is alert and oriented to person, place, and time  Skin: Skin is warm and dry  Psychiatric: She has a normal mood and affect   Her behavior is normal  Judgment and thought content normal            Current Medications     Current Outpatient Medications:     diclofenac sodium (VOLTAREN) 1 %, diclofenac 1 % topical gel  APPLY SPARINGLY TO AFFECTED AREA(S) ONCE DAILY prn, Disp: , Rfl:     ezetimibe (ZETIA) 10 mg tablet, ezetimibe 10 mg tablet  1qd, Disp: , Rfl:     gabapentin (NEURONTIN) 300 mg capsule, Take 300 mg by mouth 2 (two) times a day, Disp: , Rfl: 0    gabapentin (NEURONTIN) 300 mg capsule, Take 300 mg by mouth Three times a day, Disp: , Rfl:     levothyroxine 50 mcg tablet, Take 75 mcg by mouth daily in the early morning, Disp: , Rfl:     levothyroxine 50 mcg tablet, Take 50 mcg by mouth daily, Disp: , Rfl:     clonazePAM (KlonoPIN) 0 5 mg tablet, Take 0 5 mg by mouth daily as needed, Disp: , Rfl:     dicyclomine (BENTYL) 10 mg capsule, Take 1 capsule (10 mg total) by mouth 4 (four) times a day (before meals and at bedtime) for 30 days (Patient taking differently: Take 10 mg by mouth as needed  ), Disp: 120 capsule, Rfl: 3    enoxaparin (LOVENOX) 40 mg/0 4 mL, enoxaparin 40 mg/0 4 mL subcutaneous syringe  INJECT 1 SYRINGE EVERY DAY, Disp: , Rfl:         Migdalia Wong MD

## 2022-05-17 DIAGNOSIS — N30.10 INTERSTITIAL CYSTITIS: Primary | ICD-10-CM

## 2022-05-17 DIAGNOSIS — N39.0 RECURRENT URINARY TRACT INFECTION: Primary | ICD-10-CM

## 2022-05-17 RX ORDER — PHENAZOPYRIDINE HYDROCHLORIDE 200 MG/1
200 TABLET, FILM COATED ORAL 3 TIMES DAILY PRN
Qty: 30 TABLET | Refills: 0 | Status: SHIPPED | OUTPATIENT
Start: 2022-05-17 | End: 2023-01-10

## 2022-06-13 ENCOUNTER — OFFICE VISIT (OUTPATIENT)
Dept: PHYSICAL THERAPY | Facility: MEDICAL CENTER | Age: 77
End: 2022-06-13
Payer: MEDICARE

## 2022-06-13 DIAGNOSIS — G89.29 CHRONIC NECK PAIN: ICD-10-CM

## 2022-06-13 DIAGNOSIS — M79.7 FIBROMYALGIA: ICD-10-CM

## 2022-06-13 DIAGNOSIS — R51.9 CRANIOFACIAL PAIN: ICD-10-CM

## 2022-06-13 DIAGNOSIS — M54.2 CHRONIC NECK PAIN: ICD-10-CM

## 2022-06-13 DIAGNOSIS — G51.8 CRANIOFACIAL PAIN SYNDROME: Primary | ICD-10-CM

## 2022-06-13 DIAGNOSIS — G50.1 ATYPICAL FACIAL PAIN: ICD-10-CM

## 2022-06-13 PROCEDURE — 97163 PT EVAL HIGH COMPLEX 45 MIN: CPT | Performed by: PHYSICAL THERAPIST

## 2022-06-13 NOTE — LETTER
2022    [place] , [address] [facility]  [address] [place] [address]    Patient: Reji Keith  YOB: 1945   Date of Visit: 2022      Dear Dr Taylor Hylton:    One of your patients, Reji Keith, presented to me with chronic craniofacial pain  As you are aware, she is a pleasant 78 y o  female who presents with difficulty opening her mouth  Her greatest concerns are that she will be stuck this way and will no longer be able to eat normal foods  She has 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 and limiting her ability to chew, eat and yawn  Please verify that you agree with the plan of care by signing the attached document and sending it back to our office  If you have any questions or concerns, please don't hesitate to call  Sincerely,    Felicita Quintana DPT, PhD                          Primary Care Provider:      I certify that I have read the below Plan of Care and certify the need for these services furnished under this plan of treatment while under my care  Julian , DO  [id number]  [address] [facility] [address]  Via Fax: [phone]           PT Evaluation     Today's date: 2022  Patient name: Reji Keith  : 1945  MRN: [id number]  Referring provider: Brennon Hernandez, PT  Dx:   Encounter Diagnosis     ICD-10-CM    1  Craniofacial pain syndrome  G51 8    2  Craniofacial pain  R51 9    3  Atypical facial pain  G50 1    4  Chronic neck pain  M54 2     G89 29    5  Fibromyalgia  M79 7        Start Time: 1630  Stop Time: 1715  Total time in clinic (min): 45 minutes    Assessment  Assessment details: Reji Keith is a pleasant 78 y o  female who presents with difficulty opening her mouth  Her greatest concerns are that she will be stuck this way and will no longer be able to eat normal foods    She has 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 and limiting her ability to chew, eat and yawn  As she reports she and her dentist have been discussing options for Botox, no additional referrals are necessary at this time  Impairments include:  1) TMJ hypomobility - addressing with neuromotor retraining and with mobs and mobility exercises   2) poor cervicothoracic movement coordination - addressing with functional retraining  Understanding of Dx/Px/POC: good   Prognosis: good  Prognosis details: Positive prognostic indicators include positive attitude toward recovery  Negative prognostic indicators include chronicity of symptoms, anxiety, high symptom irritability, central sensitization, fibromyalgia and multiple concurrent orthopedic problems  Goals  Patient will be independent with home exercise program    Patient will be able to manage symptoms independently  Patient will be able to open wide enough to eat normal foods    Plan  Duration in visits: 8  Duration in weeks: 12        Subjective Evaluation    History of Present Illness  Mechanism of injury: Chronic, recurrent jaw problem that has been dormant for the past 2 years, but flared up a few months ago after 3 dental implants  It initially resolved but then become uncontrollable after eating lots of grapes consistently for a few days to try to decrease her cholesterol  She tried to obtain Botox injections as they worked in the past, but she reports she would have to travel to [facility] or [place] as no one in the  does it anymore  Additionally, her  is scheduled for his 3rd laminectomy next week and thus she will not be able to begin treatment until after that    Quality of life: good    Pain  At worst pain rating: 10    Patient Goals  Patient goals for therapy: decreased pain, increased motion, independence with ADLs/IADLs and return to sport/leisure activities          Objective     Static Posture Head  Forward  Shoulders  Rounded  Postural Observations  Seated posture: poor  Standing posture: fair        Palpation   Left   Hypertonic in the levator scapulae, scalenes, sternocleidomastoid, suboccipitals, upper trapezius, masseter, temporalis, lateral pterygoid and medial pterygoid  Tenderness of the levator scapulae, scalenes, sternocleidomastoid, suboccipitals, upper trapezius, masseter, temporalis, lateral pterygoid and medial pterygoid  Right   Hypertonic in the levator scapulae, scalenes, sternocleidomastoid, suboccipitals, upper trapezius, masseter, temporalis, lateral pterygoid and medial pterygoid  Tenderness of the levator scapulae, scalenes, sternocleidomastoid, suboccipitals, upper trapezius, masseter, temporalis, lateral pterygoid and medial pterygoid       Neurological Testing     Sensation   Cervical/Thoracic   Left   Intact: light touch    Right   Intact: light touch    Reflexes   Left   Biceps (C5/C6): normal (2+)  Mcconnell's reflex: negative    Right   Biceps (C5/C6): normal (2+)  Mcconnell's reflex: negative    Active Range of Motion   Cervical/Thoracic Spine       Cervical    Flexion:  with pain Restriction level: maximal  Extension:  with pain Restriction level: maximal  Left lateral flexion:  with pain Restriction level: maximal  Right lateral flexion:  with pain Restriction level maximal  Left rotation:  with pain Restriction level: maximal  Right rotation:  with pain Restriction level: moderate  Left Shoulder   External rotation BTH: C2 with pain  Internal rotation BTB: mid thoracic with pain    Right Shoulder   External rotation BTH: C4 with pain  Internal rotation BTB: sacrum with pain    Left Elbow   Normal active range of motion    Right Elbow   Normal active range of motion    Left Wrist   Normal active range of motion    Right Wrist   Normal active range of motion    Passive Range of Motion   Left Shoulder   Flexion: 90 degrees   Abduction: 90 degrees   External rotation 45°: 40 degrees   Internal rotation 45°: 40 degrees     Right Shoulder   Flexion: 60 degrees   Abduction: 80 degrees   External rotation 45°: 80 degrees   Internal rotation 45°: 40 degrees     Joint Play     Hypomobile: C1, C2, C3, C4, C5, C6, C7 and T1     Comments: Empty end feel throughout    Strength/Myotome Testing     Additional Strength Details  Grossly 3+/5 bilaterally    Tests   Cervical   Positive cervical distraction test and craniocervical flexion test     Left   Negative Spurling's Test A  Right   Negative Spurling's Test A  Left Shoulder   Negative ULTT1  Right Shoulder   Negative ULTT1       Ambulation   Weight-Bearing Status   Weight-Bearing Status (Left): full weight bearing   Weight-Bearing Status (Right): full weight-bearing      Observational Gait   Gait: within functional limits   TMJ   Jaw observations: facial symmetry within normal limits  Occlusion class: class I (normal)  Patient does not have a  cleft palate  Scalloping of tongue: yes  Cusp wear: yes  Jaw trauma: no  Prognathia: no  Retrognathia: no  Mursicatio buccarum: yes  Lateral bite test, Left: no pain  Lateral bite test, right: no pain  ROM: pain with movement  Opening (mm): 17   Lateral excursion, left (mm): 2 and pain  Lateral excursion, right (mm)t: 3 and pain   ROM comments: Measurements fluctuate considerably between repetitions             Precautions: High symptom irritability, central sensitization and anxiety  Date: 6/13        Manual            MFR to craniocervical mm                                                            Neuromuscular Re-education           TMJ relaxed position         TMJ controlled opening         Cervical retraction         C1-2 towel self-SNAG         TMJ lateral excursion          TMJ rhythmic stab                                  Therapeutic Exercise                                                           Therapeutic Activities                                   Gait Training                                   Modalities

## 2022-06-13 NOTE — LETTER
2022    Bebe Alex, [address] [facility]  [address] [facility] [address]    Patient: Enmanuel Rosales  YOB: 1945   Date of Visit: 2022      Dear Dr Oliver Tobar:    One of your patients, Enmanuel Rosales, was referred to me by the [facility] program   Please review the attached evaluation summary from [address] recent visit  Please verify that you agree with the plan of care by signing the attached document and sending it back to our office  If you have any questions or concerns, please don't hesitate to call  Sincerely,    Triston Gonzales, PT      Primary Care Provider:      I certify that I have read the below Plan of Care and certify the need for these services furnished under this plan of treatment while under my care  Lyleevelyn Alex, DO  [id number]   [address] [facility] [address]  Via Fax: [phone]           PT Evaluation     Today's date: 2022  Patient name: Enmanuel Rosales  : 1945  MRN: [id number]  Referring provider: Liam Miranda PT  Dx:   No diagnosis found  Assessment  Assessment details: Enmanuel Rosales is a pleasant 78 y o  female who presents with difficulty opening her mouth  Her greatest concerns are that she will be stuck this way and will no longer be able to eat normal foods  She has 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 and limiting her ability to chew, eat and yawn  As she reports she and her dentist have been discussing options for Botox, no additional referrals are necessary at this time      Impairments include:  1) TMJ hypomobility - addressing with neuromotor retraining and with mobs and mobility exercises   2) poor cervicothoracic movement coordination - addressing with functional retraining  Understanding of Dx/Px/POC: good   Prognosis: good  Prognosis details: Positive prognostic indicators include positive attitude toward recovery  Negative prognostic indicators include chronicity of symptoms, anxiety, high symptom irritability, central sensitization, fibromyalgia and multiple concurrent orthopedic problems  Goals  Patient will be independent with home exercise program    Patient will be able to manage symptoms independently  Patient will be able to open wide enough to eat normal foods    Plan  Duration in visits: 8  Duration in weeks: 12        Subjective Evaluation    History of Present Illness  Mechanism of injury: Chronic, recurrent jaw problem that has been dormant for the past 2 years, but flared up a few months ago after 3 dental implants  It initially resolved but then become uncontrollable after eating lots of grapes consistently for a few days to try to decrease her cholesterol  She tried to obtain Botox injections as they worked in the past, but she reports she would have to travel to [facility] or [place] as no one in the  does it anymore  Additionally, her  is scheduled for his 3rd laminectomy next week and thus she will not be able to begin treatment until after that  Quality of life: good    Pain  At worst pain rating: 10    Patient Goals  Patient goals for therapy: decreased pain, increased motion, independence with ADLs/IADLs and return to sport/leisure activities          Objective     Static Posture     Head  Forward  Shoulders  Rounded  Postural Observations  Seated posture: poor  Standing posture: fair        Palpation   Left   Hypertonic in the levator scapulae, scalenes, sternocleidomastoid, suboccipitals, upper trapezius, masseter, temporalis, lateral pterygoid and medial pterygoid  Tenderness of the levator scapulae, scalenes, sternocleidomastoid, suboccipitals, upper trapezius, masseter, temporalis, lateral pterygoid and medial pterygoid       Right   Hypertonic in the levator scapulae, scalenes, sternocleidomastoid, suboccipitals, upper trapezius, masseter, temporalis, lateral pterygoid and medial pterygoid  Tenderness of the levator scapulae, scalenes, sternocleidomastoid, suboccipitals, upper trapezius, masseter, temporalis, lateral pterygoid and medial pterygoid  Neurological Testing     Sensation   Cervical/Thoracic   Left   Intact: light touch    Right   Intact: light touch    Reflexes   Left   Biceps (C5/C6): normal (2+)  Mcconnell's reflex: negative    Right   Biceps (C5/C6): normal (2+)  Mcconnell's reflex: negative    Active Range of Motion   Cervical/Thoracic Spine       Cervical    Flexion:  with pain Restriction level: maximal  Extension:  with pain Restriction level: maximal  Left lateral flexion:  with pain Restriction level: maximal  Right lateral flexion:  with pain Restriction level maximal  Left rotation:  with pain Restriction level: maximal  Right rotation:  with pain Restriction level: moderate  Left Shoulder   External rotation BTH: C2 with pain  Internal rotation BTB: mid thoracic with pain    Right Shoulder   External rotation BTH: C4 with pain  Internal rotation BTB: sacrum with pain    Left Elbow   Normal active range of motion    Right Elbow   Normal active range of motion    Left Wrist   Normal active range of motion    Right Wrist   Normal active range of motion    Passive Range of Motion   Left Shoulder   Flexion: 90 degrees   Abduction: 90 degrees   External rotation 45°: 40 degrees   Internal rotation 45°: 40 degrees     Right Shoulder   Flexion: 60 degrees   Abduction: 80 degrees   External rotation 45°: 80 degrees   Internal rotation 45°: 40 degrees     Joint Play     Hypomobile: C1, C2, C3, C4, C5, C6, C7 and T1     Comments: Empty end feel throughout    Strength/Myotome Testing     Additional Strength Details  Grossly 3+/5 bilaterally    Tests   Cervical   Positive cervical distraction test and craniocervical flexion test     Left   Negative Spurling's Test A  Right   Negative Spurling's Test A       Left Shoulder Negative ULTT1  Right Shoulder   Negative ULTT1       Ambulation   Weight-Bearing Status   Weight-Bearing Status (Left): full weight bearing   Weight-Bearing Status (Right): full weight-bearing      Observational Gait   Gait: within functional limits   TMJ   Jaw observations: facial symmetry within normal limits  Occlusion class: class I (normal)  Patient does not have a  cleft palate  Scalloping of tongue: yes  Cusp wear: yes  Jaw trauma: no  Prognathia: no  Retrognathia: no  Mursicatio buccarum: yes  Lateral bite test, Left: no pain  Lateral bite test, right: no pain  ROM: pain with movement  Opening (mm): 17   Lateral excursion, left (mm): 2 and pain  Lateral excursion, right (mm)t: 3 and pain   ROM comments: Measurements fluctuate considerably between repetitions             Precautions: High symptom irritability, central sensitization and anxiety  Date: 6/13        Manual            MFR to craniocervical mm                                                            Neuromuscular Re-education           TMJ relaxed position         TMJ controlled opening         Cervical retraction         C1-2 towel self-SNAG         TMJ lateral excursion          TMJ rhythmic stab                                  Therapeutic Exercise                                                           Therapeutic Activities                                   Gait Training                                   Modalities

## 2022-06-13 NOTE — PROGRESS NOTES
PT Evaluation     Today's date: 2022  Patient name: Lakshmi Arambula  : 1945  MRN: [id number]  Referring provider: Laury Encarnacion PT  Dx:   Encounter Diagnosis     ICD-10-CM    1  Craniofacial pain syndrome  G51 8    2  Craniofacial pain  R51 9    3  Atypical facial pain  G50 1    4  Chronic neck pain  M54 2     G89 29    5  Fibromyalgia  M79 7        Start Time: 1630  Stop Time: 1715  Total time in clinic (min): 45 minutes    Assessment  Assessment details: Lakshmi Arambula is a pleasant 78 y o  female who presents with difficulty opening her mouth  Her greatest concerns are that she will be stuck this way and will no longer be able to eat normal foods  She has 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 and limiting her ability to chew, eat and yawn  As she reports she and her dentist have been discussing options for Botox, no additional referrals are necessary at this time  Impairments include:  1) TMJ hypomobility - addressing with neuromotor retraining and with mobs and mobility exercises   2) poor cervicothoracic movement coordination - addressing with functional retraining  Understanding of Dx/Px/POC: good   Prognosis: good  Prognosis details: Positive prognostic indicators include positive attitude toward recovery  Negative prognostic indicators include chronicity of symptoms, anxiety, high symptom irritability, central sensitization, fibromyalgia and multiple concurrent orthopedic problems  Goals  Patient will be independent with home exercise program    Patient will be able to manage symptoms independently  Patient will be able to open wide enough to eat normal foods    Plan  Duration in visits: 8  Duration in weeks: 12        Subjective Evaluation    History of Present Illness  Mechanism of injury: Chronic, recurrent jaw problem that has been dormant for the past 2 years, but flared up a few months ago after 3 dental implants    It initially resolved but then become uncontrollable after eating lots of grapes consistently for a few days to try to decrease her cholesterol  She tried to obtain Botox injections as they worked in the past, but she reports she would have to travel to [place] or [place] as no one in the  does it anymore  Additionally, her  is scheduled for his 3rd laminectomy next week and thus she will not be able to begin treatment until after that  Quality of life: good    Pain  At worst pain rating: 10    Patient Goals  Patient goals for therapy: decreased pain, increased motion, independence with ADLs/IADLs and return to sport/leisure activities          Objective     Static Posture     Head  Forward  Shoulders  Rounded  Postural Observations  Seated posture: poor  Standing posture: fair        Palpation   Left   Hypertonic in the levator scapulae, scalenes, sternocleidomastoid, suboccipitals, upper trapezius, masseter, temporalis, lateral pterygoid and medial pterygoid  Tenderness of the levator scapulae, scalenes, sternocleidomastoid, suboccipitals, upper trapezius, masseter, temporalis, lateral pterygoid and medial pterygoid  Right   Hypertonic in the levator scapulae, scalenes, sternocleidomastoid, suboccipitals, upper trapezius, masseter, temporalis, lateral pterygoid and medial pterygoid  Tenderness of the levator scapulae, scalenes, sternocleidomastoid, suboccipitals, upper trapezius, masseter, temporalis, lateral pterygoid and medial pterygoid       Neurological Testing     Sensation   Cervical/Thoracic   Left   Intact: light touch    Right   Intact: light touch    Reflexes   Left   Biceps (C5/C6): normal (2+)  Mcconnell's reflex: negative    Right   Biceps (C5/C6): normal (2+)  Mcconnell's reflex: negative    Active Range of Motion   Cervical/Thoracic Spine       Cervical    Flexion:  with pain Restriction level: maximal  Extension:  with pain Restriction level: maximal  Left lateral flexion: with pain Restriction level: maximal  Right lateral flexion:  with pain Restriction level maximal  Left rotation:  with pain Restriction level: maximal  Right rotation:  with pain Restriction level: moderate  Left Shoulder   External rotation BTH: C2 with pain  Internal rotation BTB: mid thoracic with pain    Right Shoulder   External rotation BTH: C4 with pain  Internal rotation BTB: sacrum with pain    Left Elbow   Normal active range of motion    Right Elbow   Normal active range of motion    Left Wrist   Normal active range of motion    Right Wrist   Normal active range of motion    Passive Range of Motion   Left Shoulder   Flexion: 90 degrees   Abduction: 90 degrees   External rotation 45°: 40 degrees   Internal rotation 45°: 40 degrees     Right Shoulder   Flexion: 60 degrees   Abduction: 80 degrees   External rotation 45°: 80 degrees   Internal rotation 45°: 40 degrees     Joint Play     Hypomobile: C1, C2, C3, C4, C5, C6, C7 and T1     Comments: Empty end feel throughout    Strength/Myotome Testing     Additional Strength Details  Grossly 3+/5 bilaterally    Tests   Cervical   Positive cervical distraction test and craniocervical flexion test     Left   Negative Spurling's Test A  Right   Negative Spurling's Test A  Left Shoulder   Negative ULTT1  Right Shoulder   Negative ULTT1       Ambulation   Weight-Bearing Status   Weight-Bearing Status (Left): full weight bearing   Weight-Bearing Status (Right): full weight-bearing      Observational Gait   Gait: within functional limits   TMJ   Jaw observations: facial symmetry within normal limits  Occlusion class: class I (normal)  Patient does not have a  cleft palate  Scalloping of tongue: yes  Cusp wear: yes  Jaw trauma: no  Prognathia: no  Retrognathia: no  Mursicatio buccarum: yes  Lateral bite test, Left: no pain  Lateral bite test, right: no pain  ROM: pain with movement  Opening (mm): 17   Lateral excursion, left (mm): 2 and pain  Lateral excursion, right (mm)t: 3 and pain   ROM comments: Measurements fluctuate considerably between repetitions             Precautions: High symptom irritability, central sensitization and anxiety  Date: 6/13        Manual            MFR to craniocervical mm                                                            Neuromuscular Re-education           TMJ relaxed position         TMJ controlled opening         Cervical retraction         C1-2 towel self-SNAG         TMJ lateral excursion          TMJ rhythmic stab                                  Therapeutic Exercise                                                           Therapeutic Activities                                   Gait Training                                   Modalities

## 2022-06-14 DIAGNOSIS — N30.10 INTERSTITIAL CYSTITIS: Primary | ICD-10-CM

## 2022-06-14 RX ORDER — PHENAZOPYRIDINE HYDROCHLORIDE 200 MG/1
200 TABLET, FILM COATED ORAL 3 TIMES DAILY PRN
Qty: 30 TABLET | Refills: 1 | Status: SHIPPED | OUTPATIENT
Start: 2022-06-14 | End: 2023-01-10

## 2022-07-11 ENCOUNTER — OFFICE VISIT (OUTPATIENT)
Dept: PHYSICAL THERAPY | Facility: MEDICAL CENTER | Age: 77
End: 2022-07-11
Payer: MEDICARE

## 2022-07-11 DIAGNOSIS — G89.29 CHRONIC NECK PAIN: ICD-10-CM

## 2022-07-11 DIAGNOSIS — M79.7 FIBROMYALGIA: ICD-10-CM

## 2022-07-11 DIAGNOSIS — G51.8 CRANIOFACIAL PAIN SYNDROME: Primary | ICD-10-CM

## 2022-07-11 DIAGNOSIS — R51.9 CRANIOFACIAL PAIN: ICD-10-CM

## 2022-07-11 DIAGNOSIS — G50.1 ATYPICAL FACIAL PAIN: ICD-10-CM

## 2022-07-11 DIAGNOSIS — M54.2 CHRONIC NECK PAIN: ICD-10-CM

## 2022-07-11 PROCEDURE — 97164 PT RE-EVAL EST PLAN CARE: CPT | Performed by: PHYSICAL THERAPIST

## 2022-07-11 PROCEDURE — 97112 NEUROMUSCULAR REEDUCATION: CPT | Performed by: PHYSICAL THERAPIST

## 2022-07-11 NOTE — PROGRESS NOTES
Re-evaluation    Today's date: 2022  Patient name: Juan Alexander  : 1945  MRN: [id number]  Referring provider: Lois Dennis PT  Dx:   Encounter Diagnosis     ICD-10-CM    1  Craniofacial pain syndrome  G51 8    2  Craniofacial pain  R51 9    3  Atypical facial pain  G50 1    4  Chronic neck pain  M54 2     G89 29    5  Fibromyalgia  M79 7                   Assessment:   1) TMJ hypomobility - addressing with neuromotor retraining and with mobs and mobility exercises   2) poor cervicothoracic movement coordination - addressing with functional retraining    Goals:  Patient will be independent with home exercise program  - in progress  Patient will be able to manage symptoms independently  - in progress  Patient will be able to open wide enough to eat normal foods - in progress      Plan: She is getting SON block next week and thus would like to discontinue this episode of care  Subjective: She reports she has been eating a soft diet and has been able to move her jaw a little better        Objective: See treatment diary below  ROM: pain with movement  Opening (mm): 24   Lateral excursion, left (mm): 9 and pain  Lateral excursion, right (mm): 7 and pain   ROM comments: Measurements fluctuate considerably between repetitions    Precautions: High symptom irritability, central sensitization and anxiety  Date:         Manual            MFR to craniocervical mm                                                            Neuromuscular Re-education           TMJ relaxed position         TMJ controlled opening SK        Cervical retraction SK        C1-2 towel self-SNAG         TMJ lateral excursion  SK        TMJ rhythmic stab                                  Therapeutic Exercise                                                           Therapeutic Activities                                   Gait Training                                   Modalities

## 2022-07-14 ENCOUNTER — HOSPITAL ENCOUNTER (OUTPATIENT)
Dept: MAMMOGRAPHY | Facility: CLINIC | Age: 77
Discharge: HOME | End: 2022-07-14
Payer: MEDICARE

## 2022-07-14 DIAGNOSIS — R92.8 ABNORMAL MAMMOGRAM: ICD-10-CM

## 2022-07-14 PROCEDURE — G0279 TOMOSYNTHESIS, MAMMO: HCPCS

## 2022-07-14 PROCEDURE — 77065 DX MAMMO INCL CAD UNI: CPT

## 2022-07-15 ENCOUNTER — PATIENT MESSAGE (OUTPATIENT)
Dept: UROLOGY | Facility: MEDICAL CENTER | Age: 77
End: 2022-07-15

## 2022-07-15 DIAGNOSIS — N30.10 INTERSTITIAL CYSTITIS: Primary | ICD-10-CM

## 2022-07-18 ENCOUNTER — APPOINTMENT (OUTPATIENT)
Dept: PHYSICAL THERAPY | Facility: MEDICAL CENTER | Age: 77
End: 2022-07-18
Payer: MEDICARE

## 2022-07-28 ENCOUNTER — APPOINTMENT (OUTPATIENT)
Dept: PHYSICAL THERAPY | Facility: MEDICAL CENTER | Age: 77
End: 2022-07-28
Payer: MEDICARE

## 2022-10-21 ENCOUNTER — TELEPHONE (OUTPATIENT)
Dept: UROLOGY | Facility: MEDICAL CENTER | Age: 77
End: 2022-10-21

## 2023-01-10 ENCOUNTER — OFFICE VISIT (OUTPATIENT)
Dept: UROLOGY | Facility: MEDICAL CENTER | Age: 78
End: 2023-01-10

## 2023-01-10 VITALS
HEART RATE: 80 BPM | BODY MASS INDEX: 22.3 KG/M2 | SYSTOLIC BLOOD PRESSURE: 120 MMHG | HEIGHT: 61 IN | DIASTOLIC BLOOD PRESSURE: 80 MMHG

## 2023-01-10 DIAGNOSIS — N30.10 INTERSTITIAL CYSTITIS: Primary | ICD-10-CM

## 2023-01-10 DIAGNOSIS — Z01.810 PREOPERATIVE CARDIOVASCULAR EXAMINATION: ICD-10-CM

## 2023-01-10 DIAGNOSIS — R35.0 URINARY FREQUENCY: ICD-10-CM

## 2023-01-10 DIAGNOSIS — R39.15 URINARY URGENCY: ICD-10-CM

## 2023-01-10 RX ORDER — GABAPENTIN 100 MG/1
100 CAPSULE ORAL EVERY 8 HOURS PRN
COMMUNITY
Start: 2022-12-29

## 2023-01-10 RX ORDER — LEVOFLOXACIN 5 MG/ML
750 INJECTION, SOLUTION INTRAVENOUS ONCE
OUTPATIENT
Start: 2023-01-10 | End: 2023-01-10

## 2023-01-10 NOTE — H&P
[facility]  [facility]  [address] [facility]  [place], [address] [place] [address]  [phone]  www  [facility]  org      NAME: Tobias Lee  AGE: 78 y o  SEX: adult  : 1945   MRN: [id number]    DATE: 1/10/2023  TIME: 10:02 AM    Assessment and Plan:    Interstitial cystitis, and urinary urgency and frequency-the InterStim 2 generator is dying  It is a very old unit  She wishes to have this exchanged and we will exchange both the generator and the lead for an MRI compatible unit  The risks of bleeding infection need for additional procedures were explained and she gives informed consent  Chief Complaint   No chief complaint on file  History of Present Illness   Last seen by me personally in the office 2019  History of interstitial cystitis  Has a history of InterStim  She had repositioning of the InterStim generator 2017 because the previous physician gave pressure on her sciatic nerve and this has been placed at another facility  having problems with spinal stenosis and is undergoing facet blocks having bilateral quadricep spasms  On no  meds  Her IC has been OK- has some pelvic pressure, but watches her diet  The Gabapentin helps  Has urinary frequency when she takes it, however  The Interstim generator seems to still working  I interrogated the unit  Surprisingly, I was able to synchronize but this was intermittent so there may be some generator dysfunction  This is not surprising given the age of the unit  She was at energy level 1 2-lead III and I took it up to 5 and she did not feel any pulsations until she moved and then she felt very painful pulsations  The pulsations were in her left groin  Therefore the lead is in good position  I took it back down to 1 2 and I think that this generator has had its time  She does note a beneficial effect from the generator however    We did discuss the fact that we have units now that lasted 10 years and they are MRI compatible  The following portions of the patient's history were reviewed and updated as appropriate: allergies, current medications, past family history, past medical history, past social history, past surgical history and problem list   Past Medical History:   Diagnosis Date   • Allergy to adhesive tape     tears skin   • Arthritis    • Back pain    • Cataract    • Fecal smearing    • Fibromyalgia, primary    • Frequent urination    • Hypercholesteremia    • Hyperlipidemia    • Hypothyroid    • Hypothyroidism    • Interstitial cystitis     has interStim   • Interstitial cystitis    • Irritable bowel syndrome    • Kidney stone    • Microscopic hematuria    • Muscle spasm    • Myofascial pain syndrome    • Osteoarthritis    • Osteoporosis    • Other allergy, initial encounter     allergy to blue dye   • Pelvic floor dysfunction    • Pelvic pain    • PONV (postoperative nausea and vomiting)     and pruritis   • Rosacea    • Seasonal allergies    • Shingles    • Sjogren's disease ([facility] [address] )    • Skin cancer     left shoulder   • Spondylisthesis    • Urinary urgency    • Wears dentures     lower partial   • Wears glasses      Past Surgical History:   Procedure Laterality Date   • ADENOIDECTOMY     • APPENDECTOMY     • BREAST EXCISIONAL BIOPSY Left 1990 bengin   • CATARACT EXTRACTION Bilateral    • COLONOSCOPY     • ESOPHAGOGASTRODUODENOSCOPY N/A 05/25/2018    Procedure: ESOPHAGOGASTRODUODENOSCOPY (EGD); Surgeon: Daniel Pereira MD;  Location: [facility];   Service: Gastroenterology   • FL INJECTION LEFT SHOULDER (ARTHROGRAM)  01/05/2022   • HYSTERECTOMY  1971   • IR CEREBRAL ANGIOGRAPHY  10/18/2019   • OTHER SURGICAL HISTORY Left     limb lengthening   • OTHER SURGICAL HISTORY Bilateral 2012, 2014, 2017    interStim   • OTHER SURGICAL HISTORY      tendon contracture right hip   • WI CYSTOURETHROSCOPY N/A 10/04/2017    Procedure: CYSTOSCOPY;  Surgeon: Carlin Cadena Gutierrez Nunn MD;  Location: [facility] OR;  Service: Urology   • SALPINGOOPHORECTOMY  2002   • SEPTOPLASTY  1980    due to fracture  with repair   • TONSILLECTOMY       shoulder  Review of Systems   Review of Systems   Constitutional: Negative for fever  Respiratory: Negative for shortness of breath  Cardiovascular: Negative for chest pain  Genitourinary:        As per HPI   Musculoskeletal: Positive for back pain  Active Problem List     Patient Active Problem List   Diagnosis   • Fecal incontinence   • IC (interstitial cystitis)   • Increased frequency of urination   • Urinary urgency   • Difficult or painful urination   • Esophageal dysphagia   • Generalized abdominal pain   • Gastroesophageal reflux disease without esophagitis   • Nausea   • Pulsatile tinnitus of both ears   • 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   • Stenosis of left internal carotid artery   • Snoring   • Nocturnal hypoxia   • Upper airway resistance syndrome   • Idiopathic sleep related nonobstructive alveolar hypoventilation   • PONV (postoperative nausea and vomiting)       Objective   /80   Pulse 80   Ht 5' 1" (1 549 m)   BMI 22 30 kg/m²     Physical Exam  Vitals reviewed  Constitutional:       Appearance: Normal appearance  HENT:      Head: Normocephalic and atraumatic  Eyes:      Extraocular Movements: Extraocular movements intact  Cardiovascular:      Rate and Rhythm: Normal rate  Pulmonary:      Effort: Pulmonary effort is normal  No respiratory distress  Breath sounds: No stridor  No wheezing  Musculoskeletal:         General: Normal range of motion  Cervical back: Normal range of motion  Skin:     Coloration: Skin is not jaundiced or pale  Neurological:      General: No focal deficit present  Mental Status: She is alert and oriented to person, place, and time  Mental status is at baseline     Psychiatric:         Mood and Affect: Mood normal          Behavior: Behavior normal          Thought Content:  Thought content normal          Judgment: Judgment normal              Current Medications     Current Outpatient Medications:   •  Bempedoic Acid (Nexletol) 180 MG TABS, Take 180 mg by mouth 3 (three) times a week, Disp: , Rfl:   •  levothyroxine 50 mcg tablet, Take 75 mcg by mouth daily , Disp: , Rfl:   •  lidocaine (XYLOCAINE) 2 % topical gel, Apply topically 3 (three) times a day as needed for mild pain or moderate pain (Vulvodynia), Disp: 30 mL, Rfl: 3  •  diclofenac sodium (VOLTAREN) 1 %, diclofenac 1 % topical gel  APPLY SPARINGLY TO AFFECTED AREA(S) ONCE DAILY prn (Patient not taking: No sig reported), Disp: , Rfl:   •  gabapentin (NEURONTIN) 100 mg capsule, Take 100 mg by mouth every 8 (eight) hours as needed, Disp: , Rfl:   •  Multiple Vitamin (MULTIVITAMIN ADULT PO), multivitamin, Disp: , Rfl:   •  Omega 3-5-6-7-9 Fatty Acids (COMPLETE OMEGA PO), Complete [place] 700 mg-1,500 mg capsule  Take by oral route , Disp: , Rfl:         Oscar Garcia MD

## 2023-01-10 NOTE — PROGRESS NOTES
[address] [facility]  [facility]  [address] [facility], [address] [place] [address]  [phone]  www  [facility]  org      NAME: Kin Lee  AGE: 78 y o  SEX: adult  : 1945   MRN: [id number]    DATE: 1/10/2023  TIME: 10:02 AM    Assessment and Plan:    Interstitial cystitis, and urinary urgency and frequency-the InterStim 2 generator is dying  It is a very old unit  She wishes to have this exchanged and we will exchange both the generator and the lead for an MRI compatible unit  The risks of bleeding infection need for additional procedures were explained and she gives informed consent  Chief Complaint   No chief complaint on file  History of Present Illness   Last seen by me personally in the office 2019  History of interstitial cystitis  Has a history of InterStim  She had repositioning of the InterStim generator 2017 because the previous physician gave pressure on her sciatic nerve and this has been placed at another facility  having problems with spinal stenosis and is undergoing facet blocks having bilateral quadricep spasms  On no  meds  Her IC has been OK- has some pelvic pressure, but watches her diet  The Gabapentin helps  Has urinary frequency when she takes it, however  The Interstim generator seems to still working  I interrogated the unit  Surprisingly, I was able to synchronize but this was intermittent so there may be some generator dysfunction  This is not surprising given the age of the unit  She was at energy level 1 2-lead III and I took it up to 5 and she did not feel any pulsations until she moved and then she felt very painful pulsations  The pulsations were in her left groin  Therefore the lead is in good position  I took it back down to 1 2 and I think that this generator has had its time  She does note a beneficial effect from the generator however    We did discuss the fact that we have units now that lasted 10 years and they are MRI compatible  The following portions of the patient's history were reviewed and updated as appropriate: allergies, current medications, past family history, past medical history, past social history, past surgical history and problem list   Past Medical History:   Diagnosis Date   • Allergy to adhesive tape     tears skin   • Arthritis    • Back pain    • Cataract    • Fecal smearing    • Fibromyalgia, primary    • Frequent urination    • Hypercholesteremia    • Hyperlipidemia    • Hypothyroid    • Hypothyroidism    • Interstitial cystitis     has interStim   • Interstitial cystitis    • Irritable bowel syndrome    • Kidney stone    • Microscopic hematuria    • Muscle spasm    • Myofascial pain syndrome    • Osteoarthritis    • Osteoporosis    • Other allergy, initial encounter     allergy to blue dye   • Pelvic floor dysfunction    • Pelvic pain    • PONV (postoperative nausea and vomiting)     and pruritis   • Rosacea    • Seasonal allergies    • Shingles    • Sjogren's disease ([facility] [address] )    • Skin cancer     left shoulder   • Spondylisthesis    • Urinary urgency    • Wears dentures     lower partial   • Wears glasses      Past Surgical History:   Procedure Laterality Date   • ADENOIDECTOMY     • APPENDECTOMY     • BREAST EXCISIONAL BIOPSY Left 1990    bengin   • CATARACT EXTRACTION Bilateral    • COLONOSCOPY     • ESOPHAGOGASTRODUODENOSCOPY N/A 05/25/2018    Procedure: ESOPHAGOGASTRODUODENOSCOPY (EGD); Surgeon: Angel Hernandez MD;  Location: [facility];   Service: Gastroenterology   • FL INJECTION LEFT SHOULDER (ARTHROGRAM)  01/05/2022   • HYSTERECTOMY  1971   • IR CEREBRAL ANGIOGRAPHY  10/18/2019   • OTHER SURGICAL HISTORY Left     limb lengthening   • OTHER SURGICAL HISTORY Bilateral 2012, 2014, 2017    interStim   • OTHER SURGICAL HISTORY      tendon contracture right hip   • FL CYSTOURETHROSCOPY N/A 10/04/2017    Procedure: CYSTOSCOPY;  Surgeon: Onofre Hogan Citlalli Bustos MD;  Location: [facility] OR;  Service: Urology   • SALPINGOOPHORECTOMY  2002   • SEPTOPLASTY  1980    due to fracture  with repair   • TONSILLECTOMY       shoulder  Review of Systems   Review of Systems   Constitutional: Negative for fever  Respiratory: Negative for shortness of breath  Cardiovascular: Negative for chest pain  Genitourinary:        As per HPI   Musculoskeletal: Positive for back pain  Active Problem List     Patient Active Problem List   Diagnosis   • Fecal incontinence   • IC (interstitial cystitis)   • Increased frequency of urination   • Urinary urgency   • Difficult or painful urination   • Esophageal dysphagia   • Generalized abdominal pain   • Gastroesophageal reflux disease without esophagitis   • Nausea   • Pulsatile tinnitus of both ears   • 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   • Stenosis of left internal carotid artery   • Snoring   • Nocturnal hypoxia   • Upper airway resistance syndrome   • Idiopathic sleep related nonobstructive alveolar hypoventilation   • PONV (postoperative nausea and vomiting)       Objective   /80   Pulse 80   Ht 5' 1" (1 549 m)   BMI 22 30 kg/m²     Physical Exam  Vitals reviewed  Constitutional:       Appearance: Normal appearance  HENT:      Head: Normocephalic and atraumatic  Eyes:      Extraocular Movements: Extraocular movements intact  Cardiovascular:      Rate and Rhythm: Normal rate  Pulmonary:      Effort: Pulmonary effort is normal  No respiratory distress  Breath sounds: No stridor  No wheezing  Musculoskeletal:         General: Normal range of motion  Cervical back: Normal range of motion  Skin:     Coloration: Skin is not jaundiced or pale  Neurological:      General: No focal deficit present  Mental Status: She is alert and oriented to person, place, and time  Mental status is at baseline     Psychiatric:         Mood and Affect: Mood normal          Behavior: Behavior normal          Thought Content:  Thought content normal          Judgment: Judgment normal              Current Medications     Current Outpatient Medications:   •  Bempedoic Acid (Nexletol) 180 MG TABS, Take 180 mg by mouth 3 (three) times a week, Disp: , Rfl:   •  levothyroxine 50 mcg tablet, Take 75 mcg by mouth daily , Disp: , Rfl:   •  lidocaine (XYLOCAINE) 2 % topical gel, Apply topically 3 (three) times a day as needed for mild pain or moderate pain (Vulvodynia), Disp: 30 mL, Rfl: 3  •  diclofenac sodium (VOLTAREN) 1 %, diclofenac 1 % topical gel  APPLY SPARINGLY TO AFFECTED AREA(S) ONCE DAILY prn (Patient not taking: No sig reported), Disp: , Rfl:   •  gabapentin (NEURONTIN) 100 mg capsule, Take 100 mg by mouth every 8 (eight) hours as needed, Disp: , Rfl:   •  Multiple Vitamin (MULTIVITAMIN ADULT PO), multivitamin, Disp: , Rfl:   •  Omega 3-5-6-7-9 Fatty Acids (COMPLETE OMEGA PO), Complete [place] 700 mg-1,500 mg capsule  Take by oral route , Disp: , Rfl:         Mavis Coronel MD

## 2023-01-27 ENCOUNTER — TELEPHONE (OUTPATIENT)
Dept: UROLOGY | Facility: AMBULATORY SURGERY CENTER | Age: 78
End: 2023-01-27

## 2023-01-27 NOTE — TELEPHONE ENCOUNTER
I spoke with pt this afternoon and confirmed scheduling her procedure with Dr Di Padron at the [facility] on 3/8/2023  I verbally went over all of pt 's pre op instructions and prep information with her  She is aware that he pre op testing needs to be done 2 weeks prior to surgery  Pt is also aware that she will need a  on the day of surgery and will stop any Aspirin and/or vitamins 1 week prior  Per pt 's request I am mailing her a copy of her surgical packet and instructed her to call our office with any questions or concerns regarding this procedure

## 2023-01-31 ENCOUNTER — HOSPITAL ENCOUNTER (OUTPATIENT)
Dept: MAMMOGRAPHY | Facility: MEDICAL CENTER | Age: 78
Discharge: HOME | End: 2023-01-31

## 2023-01-31 VITALS — HEIGHT: 61 IN | BODY MASS INDEX: 22.28 KG/M2 | WEIGHT: 118 LBS

## 2023-01-31 DIAGNOSIS — Z12.31 ENCOUNTER FOR SCREENING MAMMOGRAM FOR MALIGNANT NEOPLASM OF BREAST: ICD-10-CM

## 2023-02-13 ENCOUNTER — TELEPHONE (OUTPATIENT)
Dept: OTHER | Facility: OTHER | Age: 78
End: 2023-02-13

## 2023-02-13 NOTE — TELEPHONE ENCOUNTER
Patient called today regarding she has an Appointment for Pre- op Testing for Feb 22, 4234 that Conflicts with another Procedure on that day with [facility]  Patient wants to reschedule her Pre-op Testing

## 2023-02-13 NOTE — TELEPHONE ENCOUNTER
Called patient and let her know at [place] [address] facility's you can just walk in and get testing done, no appointment needed

## 2023-02-21 ENCOUNTER — PREP FOR PROCEDURE (OUTPATIENT)
Dept: OBGYN CLINIC | Facility: OTHER | Age: 78
End: 2023-02-21

## 2023-02-21 DIAGNOSIS — G57.31 LESION OF RIGHT LATERAL POPLITEAL NERVE: ICD-10-CM

## 2023-02-21 DIAGNOSIS — R39.15 URINARY URGENCY: ICD-10-CM

## 2023-02-21 DIAGNOSIS — G57.32 LESION OF LEFT LATERAL POPLITEAL NERVE: ICD-10-CM

## 2023-02-21 DIAGNOSIS — Z01.812 PRE-OPERATIVE LABORATORY EXAMINATION: ICD-10-CM

## 2023-02-21 DIAGNOSIS — R35.0 INCREASED FREQUENCY OF URINATION: Primary | ICD-10-CM

## 2023-02-28 ENCOUNTER — ANESTHESIA EVENT (OUTPATIENT)
Dept: PERIOP | Facility: HOSPITAL | Age: 78
End: 2023-02-28

## 2023-02-28 RX ORDER — PREDNISOLONE ACETATE 10 MG/ML
1 SUSPENSION/ DROPS OPHTHALMIC
COMMUNITY
Start: 2023-02-23

## 2023-02-28 RX ORDER — ONDANSETRON 4 MG/1
TABLET, FILM COATED ORAL
COMMUNITY
Start: 2023-02-07

## 2023-02-28 RX ORDER — PANTOPRAZOLE SODIUM 40 MG/1
40 TABLET, DELAYED RELEASE ORAL DAILY
COMMUNITY
Start: 2023-02-07

## 2023-02-28 NOTE — PRE-PROCEDURE INSTRUCTIONS
Pre-Surgery Instructions:   Medication Instructions   • Bempedoic Acid (Nexletol) 180 MG TABS Hold day of surgery  • gabapentin (NEURONTIN) 100 mg capsule Uses PRN- OK to take day of surgery   • levothyroxine 50 mcg tablet Take day of surgery  • lidocaine (XYLOCAINE) 2 % topical gel Hold day of surgery  • Multiple Vitamin (MULTIVITAMIN ADULT PO) Stop taking 7 days prior to surgery  • Omega 3-5-6-7-9 Fatty Acids (COMPLETE OMEGA PO) Stop taking 7 days prior to surgery  • ondansetron (ZOFRAN) 4 mg tablet Uses PRN- OK to take day of surgery   • pantoprazole (PROTONIX) 40 mg tablet Take day of surgery  • prednisoLONE acetate (PRED FORTE) 1 % ophthalmic suspension Take day of surgery  NPO after midnight, meds in am with sips of water  Understands when to expect preop phone call  Remove all jewelry  Advised of visitation policy  Before your operation, you play an important role in decreasing your risk for infection by washing with special antiseptic soap  This is an effective way to reduce bacteria on the skin which may help to prevent infections at the surgical site  Please read the following directions in advance  1  In the week before your operation purchase a 4 ounce bottle of antiseptic soap containing chlorhexidine gluconate 4%  Some brand names include: Aplicare, Endure, and Hibiclens  The cost is usually less than $5 00  · For your convenience, the [address] [place] [address] carries the soap  · It may also be available at your doctor's office or pre-admission testing center, and at most retail pharmacies  · If you are allergic or sensitive to soaps containing chlorhexidine gluconate (CHG), please let your doctor know so another antiseptic soap can be suggested  · CHG antiseptic soap is for external use only  2      The day before your operation follow these directions carefully to get ready    · Place clean lines (sheets) on your bed; you should sleep on clean sheets after your evening shower  · Get clean towels and washcloths ready - you need enough for 2 showers  · Set aside clean underwear, pajamas, and clothing to wear after the shower  Reminders:  · DO NOT use any other soap or body rinse on your skin during or after the antiseptic showers  · DO NOT use lotion , powder, deodorant, or perfume/aftershave of any kind on your skin after your antiseptic shower  · DO NOT shave any body parts in the 24 hours/the day before your operation  · DO NOT get the antiseptic soap in your eyes, ears, nose, mouth, or vaginal area  3      You will need to shower the night before AND the morning of your Surgery  Shower 1:  · The evening before your operation, take the fist shower  · First, shampoo your hair with regular shampoo and rinse it completely before you use the anitseptic soap  After washing and rinsing your hair, rinse your body  · Next, use a clean wash cloth to apply the antiseptic soap and wash your body from the neck down to your toes using 1/2 bottle of the antiseptic soap  You will use the other 1/2 bottle for the second shower  · Clean the area where your incision will be; later this area well for about 2 minutes  · If you ar having head or neck surgery, wash areas with the antiseptic soap  · Rinse yourself completely with running water  · Use a clean towel to dry off  · Wear clean underwear and clothing/pajamas  Shower 2:  · The Morning of your operation, take the second shower following the same steps as Shower 1 using the second 1/2 of the bottle of antiseptic soap  · Use clean cloths and towels to was and dry yourself off  · Wear clean underwear and clothing

## 2023-03-02 ENCOUNTER — APPOINTMENT (OUTPATIENT)
Dept: LAB | Facility: HOSPITAL | Age: 78
End: 2023-03-02

## 2023-03-02 DIAGNOSIS — R35.0 INCREASED FREQUENCY OF URINATION: ICD-10-CM

## 2023-03-02 DIAGNOSIS — Z01.810 PREOPERATIVE CARDIOVASCULAR EXAMINATION: ICD-10-CM

## 2023-03-02 DIAGNOSIS — R39.15 URINARY URGENCY: ICD-10-CM

## 2023-03-02 DIAGNOSIS — Z01.812 PRE-OPERATIVE LABORATORY EXAMINATION: ICD-10-CM

## 2023-03-02 LAB
ANION GAP SERPL CALCULATED.3IONS-SCNC: 6 MMOL/L (ref 4–13)
ATRIAL RATE: 71 BPM
BASOPHILS # BLD MANUAL: 0 THOUSAND/UL (ref 0–0.1)
BASOPHILS NFR MAR MANUAL: 0 % (ref 0–1)
BUN SERPL-MCNC: 17 MG/DL (ref 5–25)
CALCIUM SERPL-MCNC: 9.6 MG/DL (ref 8.4–10.2)
CHLORIDE SERPL-SCNC: 103 MMOL/L (ref 96–108)
CO2 SERPL-SCNC: 31 MMOL/L (ref 21–32)
CREAT SERPL-MCNC: 0.66 MG/DL (ref 0.6–1.3)
EOSINOPHIL # BLD MANUAL: 0.22 THOUSAND/UL (ref 0–0.4)
EOSINOPHIL NFR BLD MANUAL: 2 % (ref 0–6)
ERYTHROCYTE [DISTWIDTH] IN BLOOD BY AUTOMATED COUNT: 12.3 % (ref 11.6–15.1)
GLUCOSE SERPL-MCNC: 93 MG/DL (ref 65–140)
HCT VFR BLD AUTO: 39.1 % (ref 36.5–46.1)
HGB BLD-MCNC: 12.5 G/DL (ref 12–15.4)
LYMPHOCYTES # BLD AUTO: 0.75 THOUSAND/UL (ref 0.6–4.47)
LYMPHOCYTES # BLD AUTO: 7 % (ref 14–44)
MCH RBC QN AUTO: 30.1 PG (ref 26.8–34.3)
MCHC RBC AUTO-ENTMCNC: 32 G/DL (ref 31.4–37.4)
MCV RBC AUTO: 94 FL (ref 82–98)
MONOCYTES # BLD AUTO: 1.18 THOUSAND/UL (ref 0–1.22)
MONOCYTES NFR BLD: 11 % (ref 4–12)
MYELOCYTES NFR BLD MANUAL: 1 % (ref 0–1)
NEUTROPHILS # BLD MANUAL: 8.39 THOUSAND/UL (ref 1.85–7.62)
NEUTS SEG NFR BLD AUTO: 78 % (ref 43–75)
P AXIS: 63 DEGREES
PLATELET # BLD AUTO: 389 THOUSANDS/UL (ref 149–390)
PLATELET BLD QL SMEAR: ADEQUATE
PMV BLD AUTO: 9.2 FL (ref 8.9–12.7)
POTASSIUM SERPL-SCNC: 3.8 MMOL/L (ref 3.5–5.3)
PR INTERVAL: 158 MS
QRS AXIS: -59 DEGREES
QRSD INTERVAL: 102 MS
QT INTERVAL: 372 MS
QTC INTERVAL: 404 MS
RBC # BLD AUTO: 4.15 MILLION/UL (ref 3.88–5.12)
RBC MORPH BLD: NORMAL
SODIUM SERPL-SCNC: 140 MMOL/L (ref 135–147)
T WAVE AXIS: 40 DEGREES
VARIANT LYMPHS # BLD AUTO: 1 %
VENTRICULAR RATE: 71 BPM
WBC # BLD AUTO: 10.75 THOUSAND/UL (ref 4.31–10.16)

## 2023-03-08 ENCOUNTER — HOSPITAL ENCOUNTER (OUTPATIENT)
Facility: HOSPITAL | Age: 78
Setting detail: OUTPATIENT SURGERY
Discharge: HOME | End: 2023-03-08
Attending: UROLOGY | Admitting: UROLOGY

## 2023-03-08 ENCOUNTER — APPOINTMENT (OUTPATIENT)
Dept: RADIOLOGY | Facility: HOSPITAL | Age: 78
End: 2023-03-08

## 2023-03-08 ENCOUNTER — ANESTHESIA (OUTPATIENT)
Dept: PERIOP | Facility: HOSPITAL | Age: 78
End: 2023-03-08

## 2023-03-08 VITALS
WEIGHT: 114.2 LBS | HEART RATE: 78 BPM | DIASTOLIC BLOOD PRESSURE: 72 MMHG | OXYGEN SATURATION: 98 % | BODY MASS INDEX: 21.56 KG/M2 | SYSTOLIC BLOOD PRESSURE: 165 MMHG | TEMPERATURE: 97.1 F | HEIGHT: 61 IN | RESPIRATION RATE: 16 BRPM

## 2023-03-08 DIAGNOSIS — N30.10 INTERSTITIAL CYSTITIS: ICD-10-CM

## 2023-03-08 DIAGNOSIS — R35.0 INCREASED FREQUENCY OF URINATION: Primary | ICD-10-CM

## 2023-03-08 DIAGNOSIS — R39.15 URINARY URGENCY: ICD-10-CM

## 2023-03-08 LAB — GLUCOSE SERPL-MCNC: 84 MG/DL (ref 65–140)

## 2023-03-08 DEVICE — LEAD 978B128 ISTM SSMRI 4.32MM EMAN US
Type: IMPLANTABLE DEVICE | Site: BACK | Status: FUNCTIONAL
Brand: INTERSTIM™ SURESCAN™

## 2023-03-08 DEVICE — NEUROSTIMULATOR INTERSTIM X NON-RECHARGEABLE: Type: IMPLANTABLE DEVICE | Site: BACK | Status: FUNCTIONAL

## 2023-03-08 RX ORDER — MIDAZOLAM HYDROCHLORIDE 2 MG/2ML
INJECTION, SOLUTION INTRAMUSCULAR; INTRAVENOUS AS NEEDED
Status: DISCONTINUED | OUTPATIENT
Start: 2023-03-08 | End: 2023-03-08

## 2023-03-08 RX ORDER — PROPOFOL 10 MG/ML
INJECTION, EMULSION INTRAVENOUS AS NEEDED
Status: DISCONTINUED | OUTPATIENT
Start: 2023-03-08 | End: 2023-03-08

## 2023-03-08 RX ORDER — DEXMEDETOMIDINE HYDROCHLORIDE 100 UG/ML
INJECTION, SOLUTION INTRAVENOUS AS NEEDED
Status: DISCONTINUED | OUTPATIENT
Start: 2023-03-08 | End: 2023-03-08

## 2023-03-08 RX ORDER — ONDANSETRON 4 MG/1
4 TABLET, FILM COATED ORAL EVERY 8 HOURS PRN
Qty: 20 TABLET | Refills: 0 | Status: SHIPPED | OUTPATIENT
Start: 2023-03-08

## 2023-03-08 RX ORDER — HYDROCODONE BITARTRATE AND ACETAMINOPHEN 5; 325 MG/1; MG/1
1 TABLET ORAL EVERY 6 HOURS PRN
Qty: 5 TABLET | Refills: 0 | Status: SHIPPED | OUTPATIENT
Start: 2023-03-08

## 2023-03-08 RX ORDER — SODIUM CHLORIDE 9 MG/ML
125 INJECTION, SOLUTION INTRAVENOUS CONTINUOUS
Status: DISCONTINUED | OUTPATIENT
Start: 2023-03-08 | End: 2023-03-08 | Stop reason: HOSPADM

## 2023-03-08 RX ORDER — HYDROCODONE BITARTRATE AND ACETAMINOPHEN 5; 325 MG/1; MG/1
1 TABLET ORAL EVERY 6 HOURS PRN
Status: DISCONTINUED | OUTPATIENT
Start: 2023-03-08 | End: 2023-03-08 | Stop reason: HOSPADM

## 2023-03-08 RX ORDER — LEVOFLOXACIN 5 MG/ML
750 INJECTION, SOLUTION INTRAVENOUS ONCE
Status: COMPLETED | OUTPATIENT
Start: 2023-03-08 | End: 2023-03-08

## 2023-03-08 RX ORDER — CEPHALEXIN 500 MG/1
500 CAPSULE ORAL EVERY 6 HOURS SCHEDULED
Qty: 12 CAPSULE | Refills: 0 | Status: SHIPPED | OUTPATIENT
Start: 2023-03-08 | End: 2023-03-11

## 2023-03-08 RX ORDER — ONDANSETRON 2 MG/ML
INJECTION INTRAMUSCULAR; INTRAVENOUS AS NEEDED
Status: DISCONTINUED | OUTPATIENT
Start: 2023-03-08 | End: 2023-03-08

## 2023-03-08 RX ORDER — FENTANYL CITRATE 50 UG/ML
INJECTION, SOLUTION INTRAMUSCULAR; INTRAVENOUS AS NEEDED
Status: DISCONTINUED | OUTPATIENT
Start: 2023-03-08 | End: 2023-03-08

## 2023-03-08 RX ORDER — PROPOFOL 10 MG/ML
INJECTION, EMULSION INTRAVENOUS CONTINUOUS PRN
Status: DISCONTINUED | OUTPATIENT
Start: 2023-03-08 | End: 2023-03-08

## 2023-03-08 RX ORDER — GLYCOPYRROLATE 0.2 MG/ML
INJECTION INTRAMUSCULAR; INTRAVENOUS AS NEEDED
Status: DISCONTINUED | OUTPATIENT
Start: 2023-03-08 | End: 2023-03-08

## 2023-03-08 RX ORDER — LIDOCAINE HYDROCHLORIDE 10 MG/ML
INJECTION, SOLUTION EPIDURAL; INFILTRATION; INTRACAUDAL; PERINEURAL AS NEEDED
Status: DISCONTINUED | OUTPATIENT
Start: 2023-03-08 | End: 2023-03-08 | Stop reason: HOSPADM

## 2023-03-08 RX ORDER — DEXAMETHASONE SODIUM PHOSPHATE 10 MG/ML
INJECTION, SOLUTION INTRAMUSCULAR; INTRAVENOUS AS NEEDED
Status: DISCONTINUED | OUTPATIENT
Start: 2023-03-08 | End: 2023-03-08

## 2023-03-08 RX ORDER — MAGNESIUM HYDROXIDE 1200 MG/15ML
LIQUID ORAL AS NEEDED
Status: DISCONTINUED | OUTPATIENT
Start: 2023-03-08 | End: 2023-03-08 | Stop reason: HOSPADM

## 2023-03-08 RX ADMIN — SODIUM CHLORIDE 125 ML/HR: 0.9 INJECTION, SOLUTION INTRAVENOUS at 09:44

## 2023-03-08 RX ADMIN — DEXMEDETOMIDINE HCL 8 MCG: 100 INJECTION INTRAVENOUS at 10:58

## 2023-03-08 RX ADMIN — PROPOFOL 20 MG: 10 INJECTION, EMULSION INTRAVENOUS at 10:58

## 2023-03-08 RX ADMIN — LEVOFLOXACIN: 750 INJECTION, SOLUTION INTRAVENOUS at 10:33

## 2023-03-08 RX ADMIN — GLYCOPYRROLATE 0.2 MG: 0.2 INJECTION INTRAMUSCULAR; INTRAVENOUS at 10:45

## 2023-03-08 RX ADMIN — DEXMEDETOMIDINE HCL 8 MCG: 100 INJECTION INTRAVENOUS at 10:52

## 2023-03-08 RX ADMIN — ONDANSETRON 4 MG: 2 INJECTION INTRAMUSCULAR; INTRAVENOUS at 10:53

## 2023-03-08 RX ADMIN — FENTANYL CITRATE 25 MCG: 50 INJECTION INTRAMUSCULAR; INTRAVENOUS at 11:03

## 2023-03-08 RX ADMIN — PROPOFOL 25 MCG/KG/MIN: 10 INJECTION, EMULSION INTRAVENOUS at 10:50

## 2023-03-08 RX ADMIN — FENTANYL CITRATE 25 MCG: 50 INJECTION INTRAMUSCULAR; INTRAVENOUS at 11:36

## 2023-03-08 RX ADMIN — MIDAZOLAM 2 MG: 1 INJECTION INTRAMUSCULAR; INTRAVENOUS at 10:46

## 2023-03-08 RX ADMIN — DEXAMETHASONE SODIUM PHOSPHATE 4 MG: 10 INJECTION INTRAMUSCULAR; INTRAVENOUS at 10:53

## 2023-03-08 RX ADMIN — DEXMEDETOMIDINE HCL 8 MCG: 100 INJECTION INTRAVENOUS at 11:05

## 2023-03-08 RX ADMIN — FENTANYL CITRATE 25 MCG: 50 INJECTION INTRAMUSCULAR; INTRAVENOUS at 10:46

## 2023-03-08 RX ADMIN — DEXMEDETOMIDINE HCL 8 MCG: 100 INJECTION INTRAVENOUS at 10:56

## 2023-03-08 RX ADMIN — FENTANYL CITRATE 25 MCG: 50 INJECTION INTRAMUSCULAR; INTRAVENOUS at 11:09

## 2023-03-08 NOTE — ANESTHESIA PREPROCEDURE EVALUATION
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      GI/HEPATIC   (+) Esophageal dysphagia   (+) Gastroesophageal reflux disease without esophagitis      PULMONARY   (+) Idiopathic sleep related nonobstructive alveolar hypoventilation      Respiratory   (+) Nocturnal hypoxia      Genitourinary   (+) IC (interstitial cystitis)      Other   (+) Difficult or painful urination   (+) Fecal incontinence   (+) Increased frequency of urination   (+) Urinary urgency        Physical Exam    Airway    Mallampati score: II  TM Distance: >3 FB  Neck ROM: full     Dental   No notable dental hx     Cardiovascular  Rhythm: regular, Rate: normal, Cardiovascular exam normal    Pulmonary  Pulmonary exam normal Breath sounds clear to auscultation,     Other Findings        Anesthesia Plan  ASA Score- 3     Anesthesia Type- IV sedation with anesthesia with ASA Monitors  Additional Monitors:   Airway Plan:     Comment: GA prn  IV antiemetics  Plan Factors-    Chart reviewed  Patient summary reviewed  Patient is not a current smoker  Patient not instructed to abstain from smoking on day of procedure  Patient did not smoke on day of surgery  Induction- intravenous  Postoperative Plan-     Informed Consent- Anesthetic plan and risks discussed with patient and spouse

## 2023-03-08 NOTE — DISCHARGE INSTR - AVS FIRST PAGE
Call for fever greater than 101 5, inability to urinate, severe pain not relieved by pain meds, or problems with the incisions    No lifting greater than 15 lbs for 2 weeks    May walk, shower and take stairs  Take over the counter remedies if you become constipated  Remove the dressing Friday AM and you may shower  Staples to be removed at your next office visit

## 2023-03-08 NOTE — H&P
Yessy Faulkner MD  Physician  Specialty:  Urology  H&P     Signed  Encounter Date:  1/10/2023  H&P- updated 3/8/23    [address] [facility]  [facility] [address] [place] [address] Dillon Martinez, [address] [place] [address]  [phone]  www  [facility]  org        NAME: Reynaldo Lee  AGE: 78 y o  SEX: adult  : 1945          MRN: [id number]     DATE: 1/10/2023  TIME: 10:02 AM     Assessment and Plan:     Interstitial cystitis, and urinary urgency and frequency-the InterStim 2 generator is dying  It is a very old unit  She wishes to have this exchanged and we will exchange both the generator and the lead for an MRI compatible unit  The risks of bleeding infection need for additional procedures were explained and she gives informed consent                      Chief Complaint   No chief complaint on file         History of Present Illness   Last seen by me personally in the office 2019  History of interstitial cystitis  Has a history of InterStim  She had repositioning of the InterStim generator 2017 because the previous physician gave pressure on her sciatic nerve and this has been placed at another facility  having problems with spinal stenosis and is undergoing facet blocks having bilateral quadricep spasms  On no  meds  Her IC has been OK- has some pelvic pressure, but watches her diet  The Gabapentin helps  Has urinary frequency when she takes it, however  The Interstim generator seems to still working      I interrogated the unit  Surprisingly, I was able to synchronize but this was intermittent so there may be some generator dysfunction  This is not surprising given the age of the unit  She was at energy level 1 2-lead III and I took it up to 5 and she did not feel any pulsations until she moved and then she felt very painful pulsations  The pulsations were in her left groin  Therefore the lead is in good position    I took it back down to 1 2 and I think that this generator has had its time  She does note a beneficial effect from the generator however  We did discuss the fact that we have units now that lasted 10 years and they are MRI compatible         The following portions of the patient's history were reviewed and updated as appropriate: allergies, current medications, past family history, past medical history, past social history, past surgical history and problem list        Past Medical History:   Diagnosis Date   • Allergy to adhesive tape       tears skin   • Arthritis     • Back pain     • Cataract     • Fecal smearing     • Fibromyalgia, primary     • Frequent urination     • Hypercholesteremia     • Hyperlipidemia     • Hypothyroid     • Hypothyroidism     • Interstitial cystitis       has interStim   • Interstitial cystitis     • Irritable bowel syndrome     • Kidney stone     • Microscopic hematuria     • Muscle spasm     • Myofascial pain syndrome     • Osteoarthritis     • Osteoporosis     • Other allergy, initial encounter       allergy to blue dye   • Pelvic floor dysfunction     • Pelvic pain     • PONV (postoperative nausea and vomiting)       and pruritis   • Rosacea     • Seasonal allergies     • Shingles     • Sjogren's disease (H[facility])     • Skin cancer       left shoulder   • Spondylisthesis     • Urinary urgency     • Wears dentures       lower partial   • Wears glasses              Past Surgical History:   Procedure Laterality Date   • ADENOIDECTOMY       • APPENDECTOMY       • BREAST EXCISIONAL BIOPSY Left 1990     bengin   • CATARACT EXTRACTION Bilateral     • COLONOSCOPY       • ESOPHAGOGASTRODUODENOSCOPY N/A 05/25/2018     Procedure: ESOPHAGOGASTRODUODENOSCOPY (EGD); Surgeon: Annice Buerger, MD;  Location: [facility];   Service: Gastroenterology   • FL INJECTION LEFT SHOULDER (ARTHROGRAM)   01/05/2022   • HYSTERECTOMY   1971   • IR CEREBRAL ANGIOGRAPHY   10/18/2019   • OTHER SURGICAL HISTORY Left       limb lengthening   • OTHER SURGICAL HISTORY Bilateral 2012, 2014, 2017     interStim   • OTHER SURGICAL HISTORY         tendon contracture right hip   • PA CYSTOURETHROSCOPY N/A 10/04/2017     Procedure: CYSTOSCOPY;  Surgeon: Dior Griffin MD;  Location: [facility];  Service: Urology   • SALPINGOOPHORECTOMY   2002   • SEPTOPLASTY   1980     due to fracture  with repair   • TONSILLECTOMY          shoulder  Review of Systems   Review of Systems   Constitutional: Negative for fever  Respiratory: Negative for shortness of breath  Cardiovascular: Negative for chest pain  Genitourinary:        As per HPI   Musculoskeletal: Positive for back pain          Active Problem List          Patient Active Problem List   Diagnosis   • Fecal incontinence   • IC (interstitial cystitis)   • Increased frequency of urination   • Urinary urgency   • Difficult or painful urination   • Esophageal dysphagia   • Generalized abdominal pain   • Gastroesophageal reflux disease without esophagitis   • Nausea   • Pulsatile tinnitus of both ears   • 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   • Stenosis of left internal carotid artery   • Snoring   • Nocturnal hypoxia   • Upper airway resistance syndrome   • Idiopathic sleep related nonobstructive alveolar hypoventilation   • PONV (postoperative nausea and vomiting)         Objective   /80   Pulse 80   Ht 5' 1" (1 549 m)   BMI 22 30 kg/m²      Physical Exam  Vitals reviewed  Constitutional:       Appearance: Normal appearance  HENT:      Head: Normocephalic and atraumatic  Eyes:      Extraocular Movements: Extraocular movements intact  Cardiovascular:      Rate and Rhythm: Normal rate  Pulmonary:      Effort: Pulmonary effort is normal  No respiratory distress  Breath sounds: No stridor  No wheezing  Musculoskeletal:         General: Normal range of motion  Cervical back: Normal range of motion     Skin:     Coloration: Skin is not jaundiced or pale    Neurological:      General: No focal deficit present  Mental Status: She is alert and oriented to person, place, and time  Mental status is at baseline  Psychiatric:         Mood and Affect: Mood normal          Behavior: Behavior normal          Thought Content:  Thought content normal          Judgment: Judgment normal                   Current Medications      Current Outpatient Medications:   •  Bempedoic Acid (Nexletol) 180 MG TABS, Take 180 mg by mouth 3 (three) times a week, Disp: , Rfl:   •  levothyroxine 50 mcg tablet, Take 75 mcg by mouth daily , Disp: , Rfl:   •  lidocaine (XYLOCAINE) 2 % topical gel, Apply topically 3 (three) times a day as needed for mild pain or moderate pain (Vulvodynia), Disp: 30 mL, Rfl: 3  •  diclofenac sodium (VOLTAREN) 1 %, diclofenac 1 % topical gel  APPLY SPARINGLY TO AFFECTED AREA(S) ONCE DAILY prn (Patient not taking: No sig reported), Disp: , Rfl:   •  gabapentin (NEURONTIN) 100 mg capsule, Take 100 mg by mouth every 8 (eight) hours as needed, Disp: , Rfl:   •  Multiple Vitamin (MULTIVITAMIN ADULT PO), multivitamin, Disp: , Rfl:   •  Omega 3-5-6-7-9 Fatty Acids (COMPLETE OMEGA PO), Complete [place] 700 mg-1,500 mg capsule  Take by oral route , Disp: , Rfl:            Mando Doty MD                  Electronically signed by Mando Doty MD at 1/10/2023 10:09 AM      Office Visit on 1/10/2023     Office Visit on 1/10/2023      Note shared with patient  Additional Documentation    Vitals:   /80   Pulse 80   Ht 5' 1" (1 549 m)   BMI 22 30 kg/m²   BSA 1 51 m²      More Vitals   SmartForms:    [facility] PRE-CHARTING •    [facility] PCMH/PCSP WRAP UP REQUIREMENTS ADVANCED      Encounter Info:   Billing Info,   History,   Allergies,   Detailed Report        Orders Placed       CBC and differential     EKG 12 lead     Case request operating room: InterStim battery and lead explantation and insertion of new generator and lead MRI compatible Once     All Encounter Results Medication Changes       Gabapentin             100 mg Oral Every 8 hours PRN,           Patient not taking:  Reported on 10/19/2021         Patient not taking:  Reported on 10/25/2021         Patient not taking:  Reported on 10/25/2021         Patient not taking:  Reported on 10/25/2021         Patient not taking:  Reported on 10/25/2021     Phenazopyridine HCl           Patient not taking:  Reported on 10/25/2021                        Patient not taking:  Reported on 10/19/2021     Medication List     Visit Diagnoses       Interstitial cystitis     Urinary frequency     Urinary urgency     Preoperative cardiovascular examination     Problem List

## 2023-03-08 NOTE — ANESTHESIA POSTPROCEDURE EVALUATION
Post-Op Assessment Note    CV Status:  Stable    Pain management: adequate     Mental Status:  Alert and awake   Hydration Status:  Euvolemic   PONV Controlled:  Controlled   Airway Patency:  Patent      Post Op Vitals Reviewed: Yes      Staff: Anesthesiologist, CRNA         No notable events documented      BP      Temp     Pulse     Resp      SpO2      /72   Pulse 78   Temp (!) 97 1 °F (36 2 °C) (Temporal)   Resp 16   Ht 5' 1" (1 549 m)   Wt 51 8 kg (114 lb 3 2 oz)   SpO2 98%   BMI 21 58 kg/m²

## 2023-03-08 NOTE — OP NOTE
OPERATIVE REPORT  PATIENT NAME: Bindu Bowers    :  1945  MRN: [id number]  Pt Location: [facility] OR ROOM 05    SURGERY DATE: 3/8/2023    Surgeon(s) and Role:     Anne Rosa MD - Primary    Preop Diagnosis:  Interstitial cystitis [N30 10]  Urinary frequency [R35 0]  Urinary urgency [R39 15]  Demise of InterStim generator, MRI incompatible lead      Post-Op Diagnosis Codes:     * Interstitial cystitis [N30 10]     * Urinary frequency [R35 0]     * Urinary urgency [R39 15]  As above      Procedure(s): InterStim battery & lead explantation &insertion of new generator & lead MRI compatible-left buttocks generator pocket with right S3 nerve foramen lead implantation    Specimen(s):  * No specimens in log *    Estimated Blood Loss:   Minimal    Drains:  * No LDAs found *    Anesthesia Type:   IV Sedation with Anesthesia    Operative Indications:  Interstitial cystitis [N30 10]  Urinary frequency [R35 0]  Urinary urgency [R39 15]  Demise of InterStim generator and MRI incompatible lead    Operative Findings: Old generator lead explanted, new generator and lead InterStim X with MRI compatible lead implanted  Complications:   None    Procedure and Technique:      The patient was brought to OR and identified, prepared /draped supine, with Iodoband placed over buttocks  Time out performed, Fluoroscopy used to localize the location of the lead were dropped into the S3 neuroforamen  Incision was made over the generator pouch after anesthetizing with 1% Xylocaine and the old unit was located, and brought out through the wound  I then made an incision after anesthetizing the area where the lead dropped down into the S3 neuroforamen and the skin, and I used a short tipped right angle clamp and hemostat to grab the wire I then used a 20 motion of a hemostat to remove the old leads intact  I cut this lead and removed the generator from it    I then used fluoroscopy anterior and lateral views to sacral notches and appropriate distance from midline  1% Xylocaine used for local anesthesia throughout the case , 50 cc total  Needle used to locate w/ fluoro guidance S-3     The needle was placed into the right S3 neuroforamen using fluoroscopic guidance resulting in skyler at energy of 1 3-1 8 withtoe reflexes obtained  Fluoro confirmed excellent anatomic placement at S-3  Wire guide advanced through needle to appropriate depth, needle removed, scalpel incised skin adjacent to wire to accommodate dilator, which was placed over wire to appropriate depth  Tined leads with curved stylet placed into sheath, tested with excellent response, durga  At 2 and 3, and deployed, fluoro images saved to PACS and lead tunneled with the tunneling trocar to pouch, and connected to generator with 1 click of screwdriver  Impedances checked- all normal  Wound s irrigated with sterile H2O, and pouch closed with running 3-O monocryl and staples  Staple used to close medial wound and pouch- wounds dressed, pt awakened         I was present for the entire procedure and A qualified resident physician was not available    Patient Disposition:  pacu        SIGNATURE: Yoselyn Hector MD  DATE: March 8, 2023  TIME: 9:19 AM

## 2023-03-09 ENCOUNTER — TELEPHONE (OUTPATIENT)
Dept: UROLOGY | Facility: CLINIC | Age: 78
End: 2023-03-09

## 2023-03-09 ENCOUNTER — TELEPHONE (OUTPATIENT)
Dept: OTHER | Facility: OTHER | Age: 78
End: 2023-03-09

## 2023-03-09 NOTE — TELEPHONE ENCOUNTER
Called and left voicemail message for patient to please return call to discuss how she is feeling s/p procedure with Dr Elder Tang yesterday and ensure she does not have any questions or concerns  Asked patient to call back

## 2023-03-09 NOTE — TELEPHONE ENCOUNTER
Patient returned a missed call to [facility]  Stated that she feels good after the procedure, she has no questions

## 2023-03-24 ENCOUNTER — TELEPHONE (OUTPATIENT)
Dept: UROLOGY | Facility: CLINIC | Age: 78
End: 2023-03-24

## 2023-03-24 ENCOUNTER — OFFICE VISIT (OUTPATIENT)
Dept: UROLOGY | Facility: CLINIC | Age: 78
End: 2023-03-24

## 2023-03-24 VITALS
DIASTOLIC BLOOD PRESSURE: 90 MMHG | SYSTOLIC BLOOD PRESSURE: 140 MMHG | HEART RATE: 88 BPM | BODY MASS INDEX: 22.81 KG/M2 | HEIGHT: 61 IN | WEIGHT: 120.8 LBS

## 2023-03-24 DIAGNOSIS — N30.10 INTERSTITIAL CYSTITIS: ICD-10-CM

## 2023-03-24 DIAGNOSIS — R35.0 URINARY FREQUENCY: Primary | ICD-10-CM

## 2023-03-24 RX ORDER — MULTIVIT-MIN/IRON/FOLIC ACID/K 18-600-40
CAPSULE ORAL
COMMUNITY

## 2023-03-24 RX ORDER — CEPHALEXIN 500 MG/1
CAPSULE ORAL
COMMUNITY

## 2023-03-24 NOTE — PROGRESS NOTES
3/24/2023      Chief Complaint   Patient presents with   • Follow-up         Assessment and Plan    78 y o  adult managed by Dr Shaila Kohli    1  Urinary frequency/urgency  2  Interstitial cystitis    MRI compatible InterStim lead/generator implanted 3/8/2023  Self manages her program feeling well/effective    Surgical staples removed today incision dry/intact    F/U 6 months with Dr Shaila Kohli or Radha Guerrero      History of Present Illness  Beryle Boring is a 78 y o  adult here for evaluation of postop check after routine Interstim lead/generator end of battery life exchange/replacement for new MRI compatible device  Doing well since 3/8/23 surgery date  She self manages/programs her device  Longstanding IC without recent flares or infections  She is working through some L3 radiculopathy right now with buttock and thigh pains, preceding her device exchange  Now that her staples are now she wants to get back to her PT regimen at home for that  Review of Systems   Constitutional: Negative for activity change, appetite change, chills, fever and unexpected weight change  HENT: Negative  Eyes: Positive for visual disturbance (macular degeneration)  Respiratory: Negative  Negative for shortness of breath  Cardiovascular: Negative  Negative for chest pain  Gastrointestinal: Negative for abdominal pain, diarrhea, nausea and vomiting  Endocrine: Negative  Genitourinary: Negative for decreased urine volume, difficulty urinating, dysuria, flank pain, frequency, hematuria and urgency  Musculoskeletal: Negative for back pain and gait problem  Skin: Negative  Allergic/Immunologic: Negative  Neurological: Negative  Hematological: Negative for adenopathy  Does not bruise/bleed easily                  Vitals  Vitals:    03/24/23 1041   BP: 140/90   BP Location: Left arm   Patient Position: Sitting   Cuff Size: Adult   Pulse: 88   Weight: 54 8 kg (120 lb 12 8 oz)   Height: 5' 1" (1 549 m)       Physical Exam  Vitals and nursing note reviewed  Constitutional:       General: She is not in acute distress  Appearance: Normal appearance  She is well-developed  She is not diaphoretic  HENT:      Head: Normocephalic and atraumatic  Pulmonary:      Effort: Pulmonary effort is normal    Musculoskeletal:      Right lower leg: No edema  Left lower leg: No edema  Skin:     General: Skin is warm and dry  Neurological:      General: No focal deficit present  Mental Status: She is alert and oriented to person, place, and time     Psychiatric:         Mood and Affect: Mood normal          Speech: Speech normal          Behavior: Behavior normal            Past History  Past Medical History:   Diagnosis Date   • Allergy to adhesive tape     tears skin   • Arthritis    • Back pain    • Cataract    • Disorder of upper airway     Wears O2 at night   • Fecal smearing    • Fibromyalgia, primary    • Frequent urination    • Hypercholesteremia    • Hyperlipidemia    • Hypothyroid    • Hypothyroidism    • Interstitial cystitis     has interStim   • Irritable bowel syndrome    • Kidney stone    • Macular degeneration    • Microscopic hematuria    • Muscle spasm    • Myofascial pain syndrome    • Osteoarthritis    • Osteoporosis    • Other allergy, initial encounter     allergy to blue dye   • Pelvic floor dysfunction    • Pelvic pain    • PONV (postoperative nausea and vomiting)     and pruritis   • Rosacea    • Seasonal allergies    • Shingles    • Sjogren's disease (HCC)    • Skin cancer     left shoulder   • Spondylisthesis    • Urinary urgency    • Wears dentures     lower partial   • Wears glasses      Social History     Socioeconomic History   • Marital status: /Civil Union     Spouse name: None   • Number of children: None   • Years of education: None   • Highest education level: None   Occupational History   • Occupation: Nurse     Comment: Retired   Tobacco Use   • Smoking status: Never   • Smokeless tobacco: Never   Vaping Use   • Vaping Use: Never used   Substance and Sexual Activity   • Alcohol use: Not Currently     Comment: few x year   • Drug use: Never   • Sexual activity: None   Other Topics Concern   • None   Social History Narrative    Does not consume caffeine     Social Determinants of Health     Financial Resource Strain: Not on file   Food Insecurity: Not on file   Transportation Needs: Not on file   Physical Activity: Not on file   Stress: Not on file   Social Connections: Not on file   Intimate Partner Violence: Not on file   Housing Stability: Not on file     Social History     Tobacco Use   Smoking Status Never   Smokeless Tobacco Never     Family History   Problem Relation Age of Onset   • Endometrial cancer Mother 28   • Cancer Father         Oral cancer   • No Known Problems Sister    • No Known Problems Maternal Grandmother    • No Known Problems Maternal Grandfather    • No Known Problems Paternal Grandmother    • No Known Problems Paternal Grandfather    • Cancer Brother    • No Known Problems Paternal Aunt    • Breast cancer Neg Hx        The following portions of the patient's history were reviewed and updated as appropriate: allergies, current medications, past medical history, past social history, past surgical history and problem list     Results  No results found for this or any previous visit (from the past 1 hour(s))  ]  No results found for: PSA  Lab Results   Component Value Date    CALCIUM 9 6 03/02/2023    K 3 8 03/02/2023    CO2 31 03/02/2023     03/02/2023    BUN 17 03/02/2023    CREATININE 0 66 03/02/2023     Lab Results   Component Value Date    WBC 10 75 (H) 03/02/2023    HGB 12 5 03/02/2023    HCT 39 1 03/02/2023    MCV 94 03/02/2023     03/02/2023

## 2023-03-24 NOTE — TELEPHONE ENCOUNTER
Patient was seen today by Rafi Lux and we believe she left her scarf at the office, message left for patient to call office

## 2023-04-28 DIAGNOSIS — N30.10 INTERSTITIAL CYSTITIS: Primary | ICD-10-CM

## 2023-04-28 RX ORDER — PANTOPRAZOLE SODIUM 40 MG/1
40 TABLET, DELAYED RELEASE ORAL DAILY
Qty: 30 TABLET | Refills: 11 | Status: SHIPPED | OUTPATIENT
Start: 2023-04-28

## 2023-07-12 ENCOUNTER — PREP FOR PROCEDURE (OUTPATIENT)
Dept: OBGYN CLINIC | Facility: OTHER | Age: 78
End: 2023-07-12

## 2023-07-12 DIAGNOSIS — G57.32 LESION OF LEFT LATERAL POPLITEAL NERVE: ICD-10-CM

## 2023-07-12 DIAGNOSIS — G57.31 LESION OF RIGHT LATERAL POPLITEAL NERVE: Primary | ICD-10-CM

## 2023-07-18 RX ORDER — TRAZODONE HYDROCHLORIDE 50 MG/1
50 TABLET ORAL
Status: ON HOLD | COMMUNITY
End: 2023-07-21 | Stop reason: CLARIF

## 2023-07-18 NOTE — PRE-PROCEDURE INSTRUCTIONS
Pre-Surgery Instructions:   Medication Instructions   • Ascorbic Acid (Vitamin C) 500 MG CAPS Instructions provided by MD   • Bempedoic Acid (Nexletol) 180 MG TABS Take day of surgery. • Cholecalciferol (VITAMIN D3 PO) Instructions provided by MD   • levothyroxine 50 mcg tablet Take day of surgery. • Multiple Vitamin (MULTIVITAMIN ADULT PO) Instructions provided by MD   • Omega 3-5-6-7-9 Fatty Acids (COMPLETE OMEGA PO) Instructions provided by MD   • pantoprazole (PROTONIX) 40 mg tablet Take day of surgery. • traZODone (DESYREL) 50 mg tablet Take night before surgery    [facility] preop instructions reviewed with pt. Pt has surgical soap.

## 2023-07-19 ENCOUNTER — ANESTHESIA EVENT (OUTPATIENT)
Dept: PERIOP | Facility: HOSPITAL | Age: 78
End: 2023-07-19
Payer: MEDICARE

## 2023-07-21 ENCOUNTER — ANESTHESIA (OUTPATIENT)
Dept: PERIOP | Facility: HOSPITAL | Age: 78
End: 2023-07-21
Payer: MEDICARE

## 2023-07-21 ENCOUNTER — HOSPITAL ENCOUNTER (OUTPATIENT)
Facility: HOSPITAL | Age: 78
Setting detail: OUTPATIENT SURGERY
Discharge: HOME | End: 2023-07-21
Attending: PODIATRIST | Admitting: PODIATRIST
Payer: MEDICARE

## 2023-07-21 ENCOUNTER — HOSPITAL ENCOUNTER (OUTPATIENT)
Dept: RADIOLOGY | Facility: HOSPITAL | Age: 78
Setting detail: OUTPATIENT SURGERY
Discharge: HOME | End: 2023-07-21
Payer: MEDICARE

## 2023-07-21 ENCOUNTER — APPOINTMENT (OUTPATIENT)
Dept: RADIOLOGY | Facility: HOSPITAL | Age: 78
End: 2023-07-21
Payer: MEDICARE

## 2023-07-21 VITALS
HEART RATE: 83 BPM | HEIGHT: 61 IN | RESPIRATION RATE: 18 BRPM | SYSTOLIC BLOOD PRESSURE: 151 MMHG | BODY MASS INDEX: 22.19 KG/M2 | WEIGHT: 117.5 LBS | TEMPERATURE: 97.4 F | OXYGEN SATURATION: 97 % | DIASTOLIC BLOOD PRESSURE: 69 MMHG

## 2023-07-21 DIAGNOSIS — G57.31 LESION OF RIGHT LATERAL POPLITEAL NERVE: ICD-10-CM

## 2023-07-21 DIAGNOSIS — Z98.890 POST-OPERATIVE STATE: Primary | ICD-10-CM

## 2023-07-21 PROCEDURE — 73630 X-RAY EXAM OF FOOT: CPT

## 2023-07-21 RX ORDER — SODIUM CHLORIDE, SODIUM LACTATE, POTASSIUM CHLORIDE, CALCIUM CHLORIDE 600; 310; 30; 20 MG/100ML; MG/100ML; MG/100ML; MG/100ML
125 INJECTION, SOLUTION INTRAVENOUS CONTINUOUS
Status: DISCONTINUED | OUTPATIENT
Start: 2023-07-21 | End: 2023-07-21 | Stop reason: HOSPADM

## 2023-07-21 RX ORDER — KETOROLAC TROMETHAMINE 10 MG/1
10 TABLET, FILM COATED ORAL EVERY 6 HOURS PRN
Qty: 18 TABLET | Refills: 0 | Status: SHIPPED | OUTPATIENT
Start: 2023-07-21

## 2023-07-21 RX ORDER — PROPOFOL 10 MG/ML
INJECTION, EMULSION INTRAVENOUS CONTINUOUS PRN
Status: DISCONTINUED | OUTPATIENT
Start: 2023-07-21 | End: 2023-07-21

## 2023-07-21 RX ORDER — CEFAZOLIN SODIUM 2 G/50ML
2000 SOLUTION INTRAVENOUS ONCE
Status: COMPLETED | OUTPATIENT
Start: 2023-07-21 | End: 2023-07-21

## 2023-07-21 RX ORDER — ONDANSETRON 2 MG/ML
4 INJECTION INTRAMUSCULAR; INTRAVENOUS ONCE AS NEEDED
Status: DISCONTINUED | OUTPATIENT
Start: 2023-07-21 | End: 2023-07-21 | Stop reason: HOSPADM

## 2023-07-21 RX ORDER — FENTANYL CITRATE 50 UG/ML
INJECTION, SOLUTION INTRAMUSCULAR; INTRAVENOUS AS NEEDED
Status: DISCONTINUED | OUTPATIENT
Start: 2023-07-21 | End: 2023-07-21

## 2023-07-21 RX ORDER — LIDOCAINE HYDROCHLORIDE 20 MG/ML
INJECTION, SOLUTION EPIDURAL; INFILTRATION; INTRACAUDAL; PERINEURAL AS NEEDED
Status: DISCONTINUED | OUTPATIENT
Start: 2023-07-21 | End: 2023-07-21

## 2023-07-21 RX ORDER — FENTANYL CITRATE/PF 50 MCG/ML
25 SYRINGE (ML) INJECTION
Status: DISCONTINUED | OUTPATIENT
Start: 2023-07-21 | End: 2023-07-21 | Stop reason: HOSPADM

## 2023-07-21 RX ORDER — GABAPENTIN 100 MG/1
200 CAPSULE ORAL 3 TIMES DAILY
COMMUNITY

## 2023-07-21 RX ORDER — ACETAMINOPHEN 325 MG/1
650 TABLET ORAL EVERY 4 HOURS PRN
Status: DISCONTINUED | OUTPATIENT
Start: 2023-07-21 | End: 2023-07-21 | Stop reason: HOSPADM

## 2023-07-21 RX ORDER — HYDROMORPHONE HCL/PF 1 MG/ML
0.5 SYRINGE (ML) INJECTION
Status: DISCONTINUED | OUTPATIENT
Start: 2023-07-21 | End: 2023-07-21 | Stop reason: HOSPADM

## 2023-07-21 RX ORDER — DEXAMETHASONE SODIUM PHOSPHATE 10 MG/ML
INJECTION, SOLUTION INTRAMUSCULAR; INTRAVENOUS AS NEEDED
Status: DISCONTINUED | OUTPATIENT
Start: 2023-07-21 | End: 2023-07-21

## 2023-07-21 RX ORDER — ONDANSETRON 2 MG/ML
INJECTION INTRAMUSCULAR; INTRAVENOUS AS NEEDED
Status: DISCONTINUED | OUTPATIENT
Start: 2023-07-21 | End: 2023-07-21

## 2023-07-21 RX ORDER — MAGNESIUM HYDROXIDE 1200 MG/15ML
LIQUID ORAL AS NEEDED
Status: DISCONTINUED | OUTPATIENT
Start: 2023-07-21 | End: 2023-07-21 | Stop reason: HOSPADM

## 2023-07-21 RX ORDER — PROPOFOL 10 MG/ML
INJECTION, EMULSION INTRAVENOUS AS NEEDED
Status: DISCONTINUED | OUTPATIENT
Start: 2023-07-21 | End: 2023-07-21

## 2023-07-21 RX ORDER — OXYCODONE HYDROCHLORIDE AND ACETAMINOPHEN 5; 325 MG/1; MG/1
1 TABLET ORAL EVERY 4 HOURS PRN
Status: DISCONTINUED | OUTPATIENT
Start: 2023-07-21 | End: 2023-07-21 | Stop reason: HOSPADM

## 2023-07-21 RX ORDER — MIDAZOLAM HYDROCHLORIDE 2 MG/2ML
INJECTION, SOLUTION INTRAMUSCULAR; INTRAVENOUS AS NEEDED
Status: DISCONTINUED | OUTPATIENT
Start: 2023-07-21 | End: 2023-07-21

## 2023-07-21 RX ADMIN — PROPOFOL 30 MG: 10 INJECTION, EMULSION INTRAVENOUS at 14:29

## 2023-07-21 RX ADMIN — SODIUM CHLORIDE, SODIUM LACTATE, POTASSIUM CHLORIDE, AND CALCIUM CHLORIDE 125 ML/HR: .6; .31; .03; .02 INJECTION, SOLUTION INTRAVENOUS at 11:45

## 2023-07-21 RX ADMIN — PROPOFOL 70 MCG/KG/MIN: 10 INJECTION, EMULSION INTRAVENOUS at 14:29

## 2023-07-21 RX ADMIN — CEFAZOLIN SODIUM 2000 MG: 2 SOLUTION INTRAVENOUS at 14:27

## 2023-07-21 RX ADMIN — PROPOFOL 20 MG: 10 INJECTION, EMULSION INTRAVENOUS at 14:30

## 2023-07-21 RX ADMIN — MIDAZOLAM 2 MG: 1 INJECTION INTRAMUSCULAR; INTRAVENOUS at 14:26

## 2023-07-21 RX ADMIN — SODIUM CHLORIDE, SODIUM LACTATE, POTASSIUM CHLORIDE, AND CALCIUM CHLORIDE: .6; .31; .03; .02 INJECTION, SOLUTION INTRAVENOUS at 14:40

## 2023-07-21 RX ADMIN — ONDANSETRON 4 MG: 2 INJECTION INTRAMUSCULAR; INTRAVENOUS at 14:42

## 2023-07-21 RX ADMIN — DEXAMETHASONE SODIUM PHOSPHATE 6 MG: 10 INJECTION INTRAMUSCULAR; INTRAVENOUS at 14:34

## 2023-07-21 RX ADMIN — FENTANYL CITRATE 50 MCG: 50 INJECTION INTRAMUSCULAR; INTRAVENOUS at 14:40

## 2023-07-21 RX ADMIN — LIDOCAINE HYDROCHLORIDE 60 MG: 20 INJECTION, SOLUTION EPIDURAL; INFILTRATION; INTRACAUDAL at 14:29

## 2023-07-21 NOTE — DISCHARGE INSTR - AVS FIRST PAGE
Dr. Sally Bonilla Instructions    1. Take your prescribed medication as directed. 2. Upon arrival at home, lie down and elevate your surgical foot on 2 pillows. 3. Stay off your feet as much as possible for the first 24-48 hours. This is when your feet first swell and may become painful. After 48 hours you may begin limited walking following these restrictions:      Weight-bearing as tolerated in surgical shoes bilaterally     4. Drink large quantities of water. Consume no alcohol. Continue a well-balanced diet. 5. Report any unusual discomfort or fever to this office. 6. A limited amount of discomfort and swelling is to be expected. In some cases the skin may take on a bruised appearance. The surgical cleansing solution that was applied to your foot prior to the operation is dark in color and the operation site may appear to be oozing when it actually is not. 7. A slight amount of blood is to be expected, and is no cause for alarm. Do not remove the dressings. If there is active bleeding and if the bleeding persists, add additional gauze to the bandage, apply direct pressure, elevate your feet and call this office. 8. Do not get the dressings wet. As regular bathing may be inconvenient, sponge baths are recommended. 9. When anesthesia wears off and if any discomfort should be present, apply an ice pack directly over the operated area for 15 minute intervals for several hours or until the pain leaves. (USE IN EXCESS OF 15 MINUTES COULD CAUSE FROSTBITE). Do not use hot water bags or electric pads. A convenient icepack can be made by placing ice cubes in a plastic bag and covering this with a towel. 10. Take over-the-counter laxative for constipation, this is common with use of narcotic medications.

## 2023-07-21 NOTE — ANESTHESIA PREPROCEDURE EVALUATION
Procedure:  EXTERNAL NEUROLYSIS DEEP PERONEAL NERVE (Bilateral: Foot)  2ND TOE MEDIAL MARGIN  EXOSTECTOMY (Right: Foot)    Relevant Problems   ANESTHESIA   (+) PONV (postoperative nausea and vomiting) (requests Zofran, refuses scopolamine due to Sjogrens with significant dryness)      CARDIO (within normal limits)      ENDO (within normal limits)      GI/HEPATIC   (+) Esophageal dysphagia   (+) Gastroesophageal reflux disease without esophagitis (resolved, continues PPI)      MUSCULOSKELETAL   (+) Back pain (whole back)      NEURO/PSYCH   (+) Chronic pain disorder      PULMONARY   (+) Idiopathic sleep related nonobstructive alveolar hypoventilation (uses O2 nc at night, sees pulm [facility])      Genitourinary   (+) IC (interstitial cystitis)        Physical Exam    Airway    Mallampati score: I  TM Distance: >3 FB  Neck ROM: full     Dental   Comment: Many implants, veneers, etc, denies loose hardware,     Cardiovascular  Cardiovascular exam normal    Pulmonary  Pulmonary exam normal     Other Findings        Anesthesia Plan  ASA Score- 3     Anesthesia Type- IV sedation with anesthesia with ASA Monitors. Additional Monitors:   Airway Plan:     Comment: Patient strongly prefers sedation due to PONV history. Discussed GA as backup only if necessary. .       Plan Factors-    Chart reviewed. Existing labs reviewed. Patient summary reviewed. Induction- intravenous. Postoperative Plan-     Informed Consent- Anesthetic plan and risks discussed with patient.

## 2023-07-21 NOTE — OP NOTE
OPERATIVE REPORT - Podiatry  PATIENT NAME: Sae Perez    :  1945  MRN: [id number]  Pt Location: [facility] OR ROOM 01    SURGERY DATE: 2023    Surgeon(s) and Role:     * Hersey Romberg, DPM - Primary     * Max Lopez DPM - Assisting    Pre-op Diagnosis:  Lesion of right lateral popliteal nerve [G57.31]  Lesion of left lateral popliteal nerve [G57.32]  Symptomatic bone prominence right second digit     Post-Op Diagnosis Codes:     * Lesion of right lateral popliteal nerve [G57.31]     * Lesion of left lateral popliteal nerve [G57.32]      *Symptomatic bone prominence right second digit     Procedure(s) (LRB):  EXTERNAL NEUROLYSIS DEEP PERONEAL NERVE (Bilateral)  2ND TOE MEDIAL MARGIN  EXOSTECTOMY (Right)    Specimen(s):  * No specimens in log *    Estimated Blood Loss:   Minimal    Drains:  * No LDAs found *    Anesthesia Type:   Choice with 19 ml of 1% Lidocaine and 0.5% Bupivacaine in a 1:1 mixture in total for bilateral feet    Hemostasis:  Pneumatic ankle tourniquet set at 250 mmHg for 25 mins on right and 24 mins on left  Direct compression, electrocautery    Materials:  * No implants in log *  3-0 vicryl  4-0 nylon    Injectables:  None    Operative Findings:  Consistent with diagnosis    Complications:   None    Procedure and Technique:     Under mild sedation, the patient was brought into the operating room and placed on the operating room table in the supine position. IV sedation was achieved by anesthesia team and a universal timeout was performed where all parties are in agreement of correct patient, correct procedure and correct site. A pneumatic tourniquet was then placed over the patient's bilateral lower extremity with ample padding. V blocks were performed consisting of 19 ml of local anesthetic in total for bilateral feet. The foot was then prepped and draped in the usual aseptic manner.  An esmarch bandage was used to exsangunate the feet and the pneumatic tourniquet was then inflated to 250 mmHg. Bilateral deep peroneal nerve decompression:  Attention was then directed to the dorsal aspect of the right foot where an approximately 3 cm incision was made over the 2nd metatarsal and intermediate cuneiform over anatomic course of deep peroneal nerve. The incision was deepened through the subcutaneous tissue using blunt dissection. Care was taken to identify and retract all vital neural and vascular structures. All bleeders were ligated and cauterized as necessary. The deep fascia was identified and incised, the extensor hallucis brevis tendon was then exposed. Once the tendon was identified, it was tracked along its course proximally and distally. The central portion of this tendon was resected and passed from the operative field in toto. The deep peroneal nerve was visualized and was compressed against the adjacent osseous structures with narrowing of the canal both distally and proximally. Using blunt dissection, the nerve was then traced along its course and freed from any surrounding perineural adhesions and strictures. Once the nerve was completely decompressed, the area was then flushed with copious amounts of sterile normal saline. The subcutaneous tissues were closed using 4-0 vicryl. The skin was then reapproximated using 4-0 nylon suture in a running, locking fashion. Attention was then directed to the dorsal aspect of the left foot where an approximately 3 cm incision was made over the 2nd metatarsal and intermediate cuneiform over anatomic course of deep peroneal nerve. Similar to the right side, the incision was deepened through the subcutaneous tissue using blunt dissection. Care was taken to identify and retract all vital neural and vascular structures. All bleeders were ligated and cauterized as necessary. The deep fascia was identified and incised, the extensor hallucis brevis tendon was then exposed.  Once the tendon was identified, it was tracked along its course proximally and distally. The central portion of this tendon was resected and passed from the operative field in toto. The deep peroneal nerve was visualized and was compressed against the adjacent osseous structures with narrowing of the canal both distally and proximally. Using blunt dissection, the nerve was then traced along its course and freed from any surrounding perineural adhesions and strictures. Once the nerve was completely decompressed, the area was then flushed with copious amounts of sterile normal saline. The subcutaneous tissues were closed using 4-0 vicryl. The skin was then reapproximated using 4-0 nylon suture in a running, locking fashion. Right second digit exostectomy:  Attention was then directed to right second digit at area of medial bone prominence which was palpated. Skin incision 2-3mm was made over dorso-medial aspect 2nd toe PIPJ, using the blade periosteum was elevated off of the deformity, then using side cutting bur the bone prominence was removed, wound was irrigated with NSS, palpated with satisfactory removal of deformity, and then closed using 4-0 nylon. The incision sites were dressed with xeroform, gauze. This was then covered with a Jennifer and coban. The tourniquet was deflated at approximately 25 on right and 24 min on left and normal hyperemic response was noted to all digits. The patient tolerated the procedure and anesthesia well without immediate complications and transferred to PACU with vital signs stable. Dr. Estrella Anderson was present during the entire procedure and participated in all key aspects. SIGNATURE: Breezy Ramírez DPM  DATE: July 21, 2023  TIME: 3:28 PM      Portions of the record may have been created with voice recognition software. Occasional wrong word or "sound a like" substitutions may have occurred due to the inherent limitations of voice recognition software.  Read the chart carefully and recognize, using context, where substitutions have occurred.

## 2023-07-21 NOTE — DISCHARGE SUMMARY
Discharge Summary Outpatient Procedure Podiatry -   Ruth Lennon 78 y.o. adult MRN: [id number]  Unit/Bed#: OR POOL Encounter: [id number]    Admission Date: 7/21/2023     Admitting Diagnosis: Lesion of right lateral popliteal nerve [G57.31]  Lesion of left lateral popliteal nerve [G57.32]    Discharge Diagnosis: same    Procedures Performed: EXTERNAL NEUROLYSIS DEEP PERONEAL NERVE: 12835 (CPT®)  2ND TOE MEDIAL MARGIN  EXOSTECTOMY:     Complications: none    Condition at Discharge: stable    Discharge instructions/Information to patient and family:   See after visit summary for information provided to patient and family. Provisions for Follow-Up Care/Important appointments:  See after visit summary for information related to follow-up care and any pertinent home health orders. Discharge Medications:  See after visit summary for reconciled discharge medications provided to patient and family.

## 2023-07-21 NOTE — ANESTHESIA POSTPROCEDURE EVALUATION
Post-Op Assessment Note    CV Status:  Stable    Pain management: adequate     Mental Status:  Alert and awake   Hydration Status:  Euvolemic   PONV Controlled:  Controlled   Airway Patency:  Patent      Post Op Vitals Reviewed: Yes      Staff: Anesthesiologist         No notable events documented.     /65 (07/21/23 1549)    Temp 97.8 °F (36.6 °C) (07/21/23 1549)    Pulse 64 (07/21/23 1549)   Resp 17 (07/21/23 1549)    SpO2 98 % (07/21/23 1549)

## 2023-08-13 NOTE — DISCHARGE INSTRUCTIONS
Expect to see blood in the urine, and to experience urgency/frequency/burning with urination and dribbling  Call for fever greater that 101 5, inability to urinate, or severe pain not relieved by pain meds  No driving/operating machinery for 24 hours, and while taking narcotics  Take over the counter remedy of choice to avoid constipation  Drink plenty of fluids  <<-----Click here for Discharge Medication Review

## 2023-09-07 ENCOUNTER — OFFICE VISIT (OUTPATIENT)
Dept: OBGYN CLINIC | Facility: MEDICAL CENTER | Age: 78
End: 2023-09-07
Payer: MEDICARE

## 2023-09-07 VITALS
SYSTOLIC BLOOD PRESSURE: 114 MMHG | BODY MASS INDEX: 21.79 KG/M2 | WEIGHT: 115.4 LBS | DIASTOLIC BLOOD PRESSURE: 62 MMHG | HEIGHT: 61 IN

## 2023-09-07 DIAGNOSIS — N90.4 LICHEN SCLEROSUS ET ATROPHICUS OF THE VULVA: Primary | ICD-10-CM

## 2023-09-07 DIAGNOSIS — N90.89 VULVAR SKIN TAG: ICD-10-CM

## 2023-09-07 PROCEDURE — 99213 OFFICE O/P EST LOW 20 MIN: CPT | Performed by: STUDENT IN AN ORGANIZED HEALTH CARE EDUCATION/TRAINING PROGRAM

## 2023-09-07 RX ORDER — DOXYCYCLINE HYCLATE 50 MG/1
CAPSULE ORAL
COMMUNITY
Start: 2023-08-28

## 2023-09-07 RX ORDER — HYDROXYCHLOROQUINE SULFATE 200 MG/1
200 TABLET, FILM COATED ORAL 2 TIMES DAILY
COMMUNITY
Start: 2023-08-31

## 2023-09-07 RX ORDER — PREDNISOLONE ACETATE 10 MG/ML
SUSPENSION/ DROPS OPHTHALMIC
COMMUNITY
Start: 2023-08-28

## 2023-09-07 RX ORDER — CEVIMELINE HYDROCHLORIDE 30 MG/1
CAPSULE ORAL
COMMUNITY
Start: 2023-09-01

## 2023-09-07 RX ORDER — CLOBETASOL PROPIONATE 0.5 MG/G
OINTMENT TOPICAL 2 TIMES DAILY
Qty: 45 G | Refills: 2 | Status: SHIPPED | OUTPATIENT
Start: 2023-09-07

## 2023-09-07 NOTE — ASSESSMENT & PLAN NOTE
- We discussed the clinical diagnosis of lichen sclerosus on examination today (classic appearance of thin, white vulvar inflammation with the appearance of "cigarette paper" involving the introitus and perianal area in a classic "figure of eight" configuration). - We discussed that women with lichen sclerosus have a 20% risk fo having another autoimmune disease, most frequently alopecia areata, vitiligo, or thyroid disease.  - We discussed that although lichen sclerosus is not a premalignant lesion, patients have a 5% change of developing vulvar squamous cell carcinoma. We discussed that biopsy may be indicated if new ulcerations appear or if her disease persists or worsens.  - I prescribed topical 0.05% clobetasol ointment, apply to the affected twice daily.

## 2023-09-07 NOTE — PROGRESS NOTES
[facility] [address] [place] [address]    Assessment/Plan:  Johnnie Bowles is a 78 y.o.  G0 female with history of complete TRACEY BSO, pelvic pain, and interstitial cystitis who presents to establish care for vulvar skin tags and lichen sclerosis. Lichen sclerosus et atrophicus of the vulva  - We discussed the clinical diagnosis of lichen sclerosus on examination today (classic appearance of thin, white vulvar inflammation with the appearance of "cigarette paper" involving the introitus and perianal area in a classic "figure of eight" configuration). - We discussed that women with lichen sclerosus have a 20% risk fo having another autoimmune disease, most frequently alopecia areata, vitiligo, or thyroid disease.  - We discussed that although lichen sclerosus is not a premalignant lesion, patients have a 5% change of developing vulvar squamous cell carcinoma. We discussed that biopsy may be indicated if new ulcerations appear or if her disease persists or worsens.  - I prescribed topical 0.05% clobetasol ointment, apply to the affected twice daily. Diagnoses and all orders for this visit:    Lichen sclerosus et atrophicus of the vulva  -     clobetasol (TEMOVATE) 0.05 % ointment; Apply topically 2 (two) times a day            Subjective:   Johnnie Bowles is a 78 y.o. No obstetric history on file. adult. CC: skin tags and lichen sclerosis    HPI: Ángel Hall presents to establish care for vulvar skin tags and lichen sclerosis. She sees Dr. Emil Hopson for pelvic pain, IC, and myofascial pain. She sees a dermatologist who recommended establishing care with gyn for her vulvar skin tags. ROS: Review of Systems   Constitutional: Negative for chills and fever. Respiratory: Negative for cough and shortness of breath. Cardiovascular: Negative for chest pain and leg swelling. Gastrointestinal: Negative for abdominal pain, nausea and vomiting.    Genitourinary: Negative for dysuria, frequency and urgency. Neurological: Negative for dizziness, light-headedness and headaches. PFSH: The following portions of the patient's history were reviewed and updated as appropriate: allergies, current medications, past family history, past medical history, obstetric history, gynecologic history, past social history, past surgical history and problem list.       Objective:  /62   Ht 5' 1" (1.549 m)   Wt 52.3 kg (115 lb 6.4 oz)   BMI 21.80 kg/m²    Physical Exam  Constitutional:       Appearance: Normal appearance. HENT:      Head: Normocephalic and atraumatic. Cardiovascular:      Rate and Rhythm: Normal rate. Pulmonary:      Effort: Pulmonary effort is normal.   Abdominal:      General: There is no distension. Tenderness: There is no abdominal tenderness. There is no guarding. Genitourinary:     Exam position: Lithotomy position. Pubic Area: No rash. Labia:         Right: No rash, tenderness or lesion. Left: No rash, tenderness or lesion. Urethra: No prolapse, urethral swelling or urethral lesion. Vagina: No vaginal discharge, erythema, tenderness, bleeding or lesions. Cervix: No cervical motion tenderness, discharge, friability or erythema. Comments: Loss of architecture between labia major and minora. Vulvar skin tags  Lymphadenopathy:      Lower Body: No right inguinal adenopathy. No left inguinal adenopathy. Neurological:      Mental Status: She is alert[place]  [id number] [address] [place]  9/7/2023 1:08 PM

## 2023-09-28 ENCOUNTER — OFFICE VISIT (OUTPATIENT)
Dept: UROLOGY | Facility: MEDICAL CENTER | Age: 78
End: 2023-09-28
Payer: MEDICARE

## 2023-09-28 VITALS
BODY MASS INDEX: 21.52 KG/M2 | OXYGEN SATURATION: 99 % | HEIGHT: 61 IN | HEART RATE: 81 BPM | WEIGHT: 114 LBS | DIASTOLIC BLOOD PRESSURE: 72 MMHG | SYSTOLIC BLOOD PRESSURE: 130 MMHG

## 2023-09-28 DIAGNOSIS — N39.0 RECURRENT URINARY TRACT INFECTION: Primary | ICD-10-CM

## 2023-09-28 PROCEDURE — 99214 OFFICE O/P EST MOD 30 MIN: CPT | Performed by: NURSE PRACTITIONER

## 2023-09-28 RX ORDER — METHYLPREDNISOLONE 16 MG/1
TABLET ORAL
COMMUNITY

## 2023-09-28 RX ORDER — CEVIMELINE HYDROCHLORIDE 30 MG/1
CAPSULE ORAL
COMMUNITY
Start: 2023-09-01

## 2023-09-28 RX ORDER — HYDROXYCHLOROQUINE SULFATE 200 MG/1
TABLET, FILM COATED ORAL
COMMUNITY
Start: 2023-08-31

## 2023-09-28 RX ORDER — PANTOPRAZOLE SODIUM 20 MG/1
TABLET, DELAYED RELEASE ORAL
COMMUNITY
Start: 2023-09-19

## 2023-09-28 NOTE — PROGRESS NOTES
9/28/2023      Chief Complaint   Patient presents with   • Follow-up     6 month cystitis frequency     Assessment and Plan    1. Recurrent urinary tract infection  2. Urinary frequency/urgency  3. Interstitial cystitis  · Status post MRI compatible InterStim lead/generator implantation 3/8/2023  · Continue with Desert harvest-aloe vera  · Continue with programs as previously allotted in InterStim  · We discussed continuing to maintain adequate hydration while avoiding irritants  · Continue with Pilates exercises as that has greatly had benefit on pelvic floor dysfunction       History of Present Illness  Harper Rust is a 78 y.o. adult here for follow up evaluation of lower urinary tract symptoms status post insertion of MRI compatible InterStim lead/generator 3/8/2023 with Dr. Katharine Garcia. Postoperatively, patient reports all control of all lower urinary tract symptoms. She reports over the course of the last few weeks having a worsening flare of interstitial cystitis which exacerbates her pelvic floor dysfunction. She reports use of Pilates exercises as opposed to pelvic floor therapy which is greatly provided symptom control. Patient was previously on Elmiron and feels that has caused her current issues/moscoso with macular degeneration. She currently has no complaints or concerns in the office today        Review of Systems   Constitutional: Negative for chills and fever. Respiratory: Negative for cough and shortness of breath. Cardiovascular: Negative for chest pain. Gastrointestinal: Negative for abdominal distention, abdominal pain, blood in stool, nausea and vomiting. Genitourinary: Negative for difficulty urinating, dysuria, enuresis, flank pain, frequency, hematuria and urgency. Skin: Negative for rash.      Past Medical History  Past Medical History:   Diagnosis Date   • Allergy to adhesive tape     tears skin   • Arthritis    • Back pain     cervical and lumbar   • Chronic cough     from postnasal drip   • Chronic pain disorder    • Claustrophobia    • Coronary artery disease    • Dental disease Forever    Dental implants   • Dependence on nocturnal oxygen therapy     2LNC   • Disease of thyroid gland 1998   • Disorder of upper airway     Wears O2 2 LPM at night- hypoxic at night   • Dizziness 10 years    Currently increasing. Osteoarthritis cervical spine   • Dysphagia     occ   • Fatigue     Fibromyalgia   • Fecal smearing    • Fibromyalgia, primary    • Frequent urination    • Headache    • Headache(784.0) 50 years   • Hearing loss in left ear     has hearing aid   • HL (hearing loss) 2019    Currently lost.   • Hyperlipidemia    • Hypothyroid    • Interstitial cystitis     has interStim   • Irritable bowel syndrome    • Kidney stone    • Macular degeneration    • Microscopic hematuria    • Muscle spasm    • Myofascial pain syndrome    • Nocturnal hypoxia    • Osteoarthritis    • Osteoporosis    • Other allergy, initial encounter     allergy to blue dye   • Pelvic floor dysfunction    • Pelvic pain    • PONV (postoperative nausea and vomiting)     and pruritis   • Rosacea    • Seasonal allergies    • Shingles    • Sinusitis Chronic rhinitis   • Sjogren's disease ([address])    • Skin cancer     left shoulder BCC in past   • Sleep difficulties    • Spondylisthesis    • Tension headache    • Tinnitus 20years    Getting louder, now also pulsatille tinnitis   • TMJ dysfunction 50 years   • Urinary urgency    • Wears glasses        Past Social History  Past Surgical History:   Procedure Laterality Date   • ADENOIDECTOMY     • APPENDECTOMY     • BONE BIOPSY Right 7/21/2023    Procedure: 2ND TOE MEDIAL MARGIN  EXOSTECTOMY;  Surgeon: Tiburcio Jimenez DPM;  Location[facility];  Service: Podiatry   • BREAST EXCISIONAL BIOPSY Left 1990 bengin   • CATARACT EXTRACTION Bilateral    • COLONOSCOPY     • ESOPHAGOGASTRODUODENOSCOPY N/A 05/25/2018    Procedure: ESOPHAGOGASTRODUODENOSCOPY (EGD);   Surgeon: Nida Manzano Liu Sow MD;  Location: [facility] GI LAB;   Service: Gastroenterology   • FL INJECTION LEFT SHOULDER (ARTHROGRAM)  01/05/2022   • HYSTERECTOMY  1971   • IR CEREBRAL ANGIOGRAPHY  10/18/2019   • OTHER SURGICAL HISTORY Left     limb lengthening   • OTHER SURGICAL HISTORY Bilateral 2012, 2014, 2017    interStim   • OTHER SURGICAL HISTORY      tendon contracture right hip   • NJ CYSTOURETHROSCOPY N/A 10/04/2017    Procedure: Delmis Nieto;  Surgeon: Kathi Godinez MD;  Location: [facility] OR;  Service: Urology   • NJ INSERTION/RPLCMT PERIPHERAL/GASTRIC NPGR N/A 3/8/2023    Procedure: InterStim battery & lead explantation &insertion of new generator & lead MRI compatible;  Surgeon: Kathi Godinez MD;  Location: [facility] OR;  Service: Urology   • NJ NEUROPLASTY NERVE HAND/FOOT Bilateral 7/21/2023    Procedure: EXTERNAL NEUROLYSIS DEEP PERONEAL NERVE;  Surgeon: Tiburcio Jimenez DPM;  Location: [facility] OR;  Service: Podiatry   • SALPINGOOPHORECTOMY  2002   • SEPTOPLASTY  1980    due to fracture  with repair   • TONSILLECTOMY       Social History     Tobacco Use   Smoking Status Never   Smokeless Tobacco Never       Past Family History  Family History   Problem Relation Age of Onset   • Endometrial cancer Mother 28   • Cancer Father         Oral cancer   • No Known Problems Sister    • No Known Problems Maternal Grandmother    • No Known Problems Maternal Grandfather    • No Known Problems Paternal Grandmother    • No Known Problems Paternal Grandfather    • Cancer Brother    • No Known Problems Paternal Aunt    • Breast cancer Neg Hx        Past Social history  Social History     Socioeconomic History   • Marital status: /Civil Union     Spouse name: Not on file   • Number of children: Not on file   • Years of education: Not on file   • Highest education level: Not on file   Occupational History   • Occupation: Nurse     Comment: Retired   Tobacco Use   • Smoking status: Never   • Smokeless tobacco: Never   Vaping Use   • Vaping Use: Never used   Substance and Sexual Activity   • Alcohol use: Not Currently     Comment: few x year   • Drug use: Never   • Sexual activity: Yes     Partners: Male     Birth control/protection: None   Other Topics Concern   • Not on file   Social History Narrative    Does not consume caffeine     Social Determinants of Health     Financial Resource Strain: Not on file   Food Insecurity: Not on file   Transportation Needs: Not on file   Physical Activity: Not on file   Stress: Not on file   Social Connections: Not on file   Intimate Partner Violence: Not on file   Housing Stability: Not on file       Current Medications  Current Outpatient Medications   Medication Sig Dispense Refill   • Bempedoic Acid (Nexletol) 180 MG TABS Take 180 mg by mouth 3 (three) times a week Mon -wed- fri     • cevimeline (EVOXAC) 30 MG capsule Take 1 capsule twice a day by oral route for 30 days. • clobetasol (TEMOVATE) 0.05 % ointment Apply topically 2 (two) times a day 45 g 2   • doxycycline hyclate (VIBRAMYCIN) 50 mg capsule TAKE 1 CAPSULE BY MOUTH EVERY DAY FOR 7 DAYS THEN 1 CAP EVERY OTHER DAY     • hydroxychloroquine (PLAQUENIL) 200 mg tablet Take 200 mg by mouth 2 (two) times a day     • hydroxychloroquine (PLAQUENIL) 200 mg tablet Take 1 tablet twice a day by oral route for 30 days. • levothyroxine 50 mcg tablet Take 50 mcg by mouth daily     • methylPREDNISolone (Medrol) 16 mg tablet Medrol (José Luis) 4 mg tablets in a dose pack   Take as directed on packaging     • Multiple Vitamin (MULTIVITAMIN ADULT PO) in the morning     • pantoprazole (PROTONIX) 20 mg tablet TAKE 1 TABLET BY MOUTH EVERY DAY FOR 30 DAYS     • Ascorbic Acid (Vitamin C) 500 MG CAPS Take by mouth in the morning (Patient not taking: Reported on 9/7/2023)     • Cholecalciferol (VITAMIN D3 PO) Take by mouth daily (Patient not taking: Reported on 9/7/2023)       No current facility-administered medications for this visit.        Allergies  Allergies   Allergen Reactions   • Medical Tape Blisters     blisters  blisters         The following portions of the patient's history were reviewed and updated as appropriate: allergies, current medications, past medical history, past social history, past surgical history and problem list.      Vitals  Vitals:    09/28/23 0813   BP: 130/72   BP Location: Left arm   Patient Position: Sitting   Cuff Size: Adult   Pulse: 81   SpO2: 99%   Weight: 51.7 kg (114 lb)   Height: 5' 1" (1.549 m)           Physical Exam  Physical Exam  Vitals reviewed. Constitutional:       General: She is not in acute distress. Appearance: Normal appearance. Cardiovascular:      Heart sounds: Normal heart sounds. Pulmonary:      Effort: Pulmonary effort is normal. No respiratory distress. Breath sounds: Normal breath sounds. Musculoskeletal:         General: Normal range of motion. Skin:     General: Skin is warm and dry. Neurological:      General: No focal deficit present. Mental Status: She is alert. Psychiatric:         Mood and Affect: Mood normal.         Behavior: Behavior normal.           Results  No results found for this or any previous visit (from the past 1 hour(s)). ]  No results found for: "PSA"  Lab Results   Component Value Date    CALCIUM 9.3 04/12/2023    K 3.8 04/12/2023    CO2 29 04/12/2023     04/12/2023    BUN 15 04/12/2023    CREATININE 0.58 (L) 04/12/2023     Lab Results   Component Value Date    WBC 15.80 (H) 04/12/2023    HGB 12.8 04/12/2023    HCT 40.3 04/12/2023    MCV 95 04/12/2023     04/12/2023           Orders  Orders Placed This Encounter   Procedures   • Urine culture     Standing Status:   Standing     Number of Occurrences:   6     Standing Expiration Date:   9/28/2024     Order Specific Question:   Release to patient through [employer]     Answer:   Immediate   • Urinalysis with microscopic     Standing Status:   Standing     Number of Occurrences:   6     Standing Expiration Date: 9/28/2024       NOEMÍ Hernandez

## 2024-05-17 ENCOUNTER — HOSPITAL ENCOUNTER (OUTPATIENT)
Dept: MAMMOGRAPHY | Facility: MEDICAL CENTER | Age: 79
Discharge: HOME | End: 2024-05-17
Payer: MEDICARE

## 2024-05-17 VITALS — BODY MASS INDEX: 20.77 KG/M2 | HEIGHT: 61 IN | WEIGHT: 110 LBS

## 2024-05-17 DIAGNOSIS — Z12.31 VISIT FOR SCREENING MAMMOGRAM: ICD-10-CM

## 2024-05-17 PROCEDURE — 77063 BREAST TOMOSYNTHESIS BI: CPT

## 2024-05-17 PROCEDURE — 77067 SCR MAMMO BI INCL CAD: CPT

## 2024-06-21 ENCOUNTER — OFFICE VISIT (OUTPATIENT)
Dept: UROLOGY | Facility: CLINIC | Age: 79
End: 2024-06-21
Payer: MEDICARE

## 2024-06-21 VITALS
BODY MASS INDEX: 21.71 KG/M2 | HEART RATE: 93 BPM | HEIGHT: 61 IN | SYSTOLIC BLOOD PRESSURE: 110 MMHG | WEIGHT: 115 LBS | OXYGEN SATURATION: 97 % | DIASTOLIC BLOOD PRESSURE: 70 MMHG

## 2024-06-21 DIAGNOSIS — N30.10 INTERSTITIAL CYSTITIS: Primary | ICD-10-CM

## 2024-06-21 PROCEDURE — 99213 OFFICE O/P EST LOW 20 MIN: CPT

## 2024-06-21 NOTE — PROGRESS NOTES
Office Visit- Urology  Sumi Lee 1945 MRN: [id number]      Assessment/Discussion/Plan    78 y.o. adult managed by     Recurrent urinary tract infection  -No Positive urine culture in the past months on review of chart    2. Urinary Frequency/Urgency   -Acute worsening of urinary frequency/urgency with COVID infection in the winter  -InterStim not very effective.  Patient's cord exposed wiring.  Advised patient to contact InterStim representative to obtain complete replacement  -Patient was follow-up with Dr. Oseguera next week   -Pharmacotherapy not an option due to patient's history of Jorgen syndrome and the potential worsening of dry eyes/dry mouth with both beta 3 agonist and anticholinergics  -Patient defers consideration of intravesical Botox  -Follow-up in 6 months    3.Interstitial Cystitis   -Status post MRI compatible InterStim lead/generator implantation 3/8/2023  -Continue with Desert harvest-aloe vera  -Continue with programs as previously allotted in InterSti  -We discussed continuing to maintain adequate hydration while avoiding irritants  -Continue with Pilates exercises as that has greatly had benefit on pelvic floor dysfunction   -Follow-up in 6 months    Chief Complaint:   Sumi is a 78 y.o. adult presenting to the office for a follow up visit regarding interstitial cystitis        Subjective    Patient is a 78-year-old female presents for evaluation of bloating tract symptoms/interstitial cystitis/recurrent UTI s/p insertion of MRI compatible InterStim  in March 2023 with Dr. Forbes.  Postoperatively patient had control over urinary tract symptoms but patient states that she had COVID during the winter and subsequent to COVID infection she had a worsening urinary frequency and urgency with seemingly no effect from InterStim.  She also reports that she feels like she has worsening pelvic floor dysfunction due to COVID infection.  She has a follow-up with Dr. Farmer at  the [facility] for pelvic and sexual pain next week.  She has not had any urinary tract infections since she was last seen and she states that she routinely performs home urinalyses to assess for leukocytes/nitrates    ROS:   Review of Systems   Constitutional: Negative.  Negative for chills, fatigue and fever.   HENT: Negative.     Respiratory:  Negative for shortness of breath.    Cardiovascular:  Negative for chest pain.   Gastrointestinal: Negative.  Negative for abdominal pain.   Endocrine: Negative.    Musculoskeletal: Negative.    Skin: Negative.    Neurological: Negative.  Negative for dizziness and light-headedness.   Hematological: Negative.    Psychiatric/Behavioral: Negative.           Past Medical History  Past Medical History:   Diagnosis Date    Allergy to adhesive tape     tears skin    Arthritis     Back pain     cervical and lumbar    Chronic cough     from postnasal drip    Chronic pain disorder     Claustrophobia     Coronary artery disease     Dental disease Forever    Dental implants    Dependence on nocturnal oxygen therapy     2LNC    Disease of thyroid gland 1998    Disorder of upper airway     Wears O2 2 LPM at night- hypoxic at night    Dizziness 10 years    Currently increasing. Osteoarthritis cervical spine    Dysphagia     occ    Fatigue     Fibromyalgia    Fecal smearing     Fibromyalgia, primary     Frequent urination     Headache     Headache(784.0) 50 years    Hearing loss in left ear     has hearing aid    HL (hearing loss) 2019    Currently lost.    Hyperlipidemia     Hypothyroid     Interstitial cystitis     has interStim    Irritable bowel syndrome     Kidney stone     Macular degeneration     Microscopic hematuria     Muscle spasm     Myofascial pain syndrome     Nocturnal hypoxia     Osteoarthritis     Osteoporosis     Other allergy, initial encounter     allergy to blue dye    Pelvic floor dysfunction     Pelvic pain     PONV (postoperative nausea and vomiting)      and pruritis    Rosacea     Seasonal allergies     Shingles     Sinusitis Chronic rhinitis    Sjogren's disease (H[facility])     Skin cancer     left shoulder BCC in past    Sleep difficulties     Spondylisthesis     Tension headache     Tinnitus 20years    Getting louder, now also pulsatille tinnitis    TMJ dysfunction 50 years    Urinary urgency     Wears glasses        Past Surgical History  Past Surgical History:   Procedure Laterality Date    ADENOIDECTOMY      APPENDECTOMY      BONE BIOPSY Right 7/21/2023    Procedure: 2ND TOE MEDIAL MARGIN  EXOSTECTOMY;  Surgeon: Bert Beltrán DPM;  Location[facility];  Service: Podiatry    BREAST EXCISIONAL BIOPSY Left 1990    bengin    CATARACT EXTRACTION Bilateral     COLONOSCOPY      ESOPHAGOGASTRODUODENOSCOPY N/A 05/25/2018    Procedure: ESOPHAGOGASTRODUODENOSCOPY (EGD);  Surgeon: Jolene Pierce MD;  Location: [facility];  Service: Gastroenterology    FL INJECTION LEFT SHOULDER (ARTHROGRAM)  01/05/2022    HYSTERECTOMY  1971    IR CEREBRAL ANGIOGRAPHY  10/18/2019    OTHER SURGICAL HISTORY Left     limb lengthening    OTHER SURGICAL HISTORY Bilateral 2012, 2014, 2017    interStim    OTHER SURGICAL HISTORY      tendon contracture right hip    TX CYSTOURETHROSCOPY N/A 10/04/2017    Procedure: CYSTOSCOPY;  Surgeon: Gallito Forbes MD;  [facility];  Service: Urology    TX INS/RPLC PERPH SAC/GSTRC NPG/RCVR PCKT CRTJ&CONN N/A 3/8/2023    Procedure: InterStim battery & lead explantation &insertion of new generator & lead MRI compatible;  Surgeon: Gallito Forbes MD;  [facility];  Service: Urology    TX NEUROPLASTY NERVE HAND/FOOT Bilateral 7/21/2023    Procedure: EXTERNAL NEUROLYSIS DEEP PERONEAL NERVE;  Surgeon: Bert Beltrán DPM;  [facility];  Service: Podiatry    SALPINGOOPHORECTOMY  2002    SEPTOPLASTY  1980    due to fracture  with repair    TONSILLECTOMY         Past Family History  Family History   Problem Relation Age of Onset     Endometrial cancer Mother 35    Cancer Father         Oral cancer    No Known Problems Sister     No Known Problems Maternal Grandmother     No Known Problems Maternal Grandfather     No Known Problems Paternal Grandmother     No Known Problems Paternal Grandfather     Cancer Brother     No Known Problems Paternal Aunt     Breast cancer Neg Hx        Past Social history  Social History     Socioeconomic History    Marital status: /Civil Union     Spouse name: Not on file    Number of children: Not on file    Years of education: Not on file    Highest education level: Not on file   Occupational History    Occupation: Nurse     Comment: Retired   Tobacco Use    Smoking status: Never    Smokeless tobacco: Never   Vaping Use    Vaping status: Never Used   Substance and Sexual Activity    Alcohol use: Not Currently     Comment: few x year    Drug use: Never    Sexual activity: Yes     Partners: Male     Birth control/protection: None   Other Topics Concern    Not on file   Social History Narrative    Does not consume caffeine     Social Determinants of Health     Financial Resource Strain: Not on file   Food Insecurity: Not on file   Transportation Needs: Not on file   Physical Activity: Not on file   Stress: Not on file   Social Connections: Not on file   Intimate Partner Violence: Not on file   Housing Stability: Not on file       Current Medications  Current Outpatient Medications   Medication Sig Dispense Refill    Bempedoic Acid (Nexletol) 180 MG TABS Take 180 mg by mouth 3 (three) times a week Mon -wed- fri      doxycycline hyclate (VIBRAMYCIN) 50 mg capsule TAKE 1 CAPSULE BY MOUTH EVERY DAY FOR 7 DAYS THEN 1 CAP EVERY OTHER DAY      levothyroxine 50 mcg tablet Take 50 mcg by mouth daily      Multiple Vitamin (MULTIVITAMIN ADULT PO) in the morning      pantoprazole (PROTONIX) 20 mg tablet TAKE 1 TABLET BY MOUTH EVERY DAY FOR 30 DAYS      predniSONE 20 mg tablet 3 tabs day 1-3, 2 tabs day 4-6, 1 tab day 7-9  "18 tablet 0    Ascorbic Acid (Vitamin C) 500 MG CAPS Take by mouth in the morning (Patient not taking: Reported on 9/7/2023)      cevimeline (EVOXAC) 30 MG capsule Take 1 capsule twice a day by oral route for 30 days. (Patient not taking: Reported on 3/4/2024)      Cholecalciferol (VITAMIN D3 PO) Take by mouth daily (Patient not taking: Reported on 9/7/2023)      clobetasol (TEMOVATE) 0.05 % ointment Apply topically 2 (two) times a day (Patient not taking: Reported on 3/4/2024) 45 g 2    hydroxychloroquine (PLAQUENIL) 200 mg tablet Take 200 mg by mouth 2 (two) times a day (Patient not taking: Reported on 3/4/2024)      hydroxychloroquine (PLAQUENIL) 200 mg tablet Take 1 tablet twice a day by oral route for 30 days. (Patient not taking: Reported on 3/4/2024)      methylPREDNISolone (Medrol) 16 mg tablet Medrol (José Luis) 4 mg tablets in a dose pack   Take as directed on packaging (Patient not taking: Reported on 3/4/2024)       No current facility-administered medications for this visit.       Allergies  Allergies   Allergen Reactions    Medical Tape Blisters and Rash     blisters    **adhesive tape      Blisters, rash, skin tearing    Atorvastatin Myalgia     Muscle aches.    Muscle aches.    Any statins she cannot tolerate    Sulfa Antibiotics Rash and Itching       OBJECTIVE    Vitals   Vitals:    06/21/24 1059   BP: 110/70   BP Location: Left arm   Patient Position: Sitting   Cuff Size: Adult   Pulse: 93   SpO2: 97%   Weight: 52.2 kg (115 lb)   Height: 5' 1\" (1.549 m)       PVR:    Physical Exam  Constitutional:       General: She is not in acute distress.     Appearance: Normal appearance. She is normal weight. She is not ill-appearing or toxic-appearing.   HENT:      Head: Normocephalic and atraumatic.   Eyes:      Conjunctiva/sclera: Conjunctivae normal.   Cardiovascular:      Rate and Rhythm: Normal rate.   Pulmonary:      Effort: Pulmonary effort is normal. No respiratory distress.   Skin:     General: Skin is " warm and dry.   Neurological:      General: No focal deficit present.      Mental Status: She is alert and oriented to person, place, and time.      Cranial Nerves: No cranial nerve deficit.   Psychiatric:         Mood and Affect: Mood normal.         Behavior: Behavior normal.         Thought Content: Thought content normal.          Labs:     Lab Results   Component Value Date    CREATININE 0.55 06/12/2024      Lab Results   Component Value Date    HGBA1C 5.4 10/03/2019     Lab Results   Component Value Date    CALCIUM 9.9 06/12/2024    K 3.9 06/12/2024    CO2 31 06/12/2024     06/12/2024    BUN 18 06/12/2024    CREATININE 0.55 06/12/2024       I have personally reviewed all pertinent lab results and reviewed with patient    Imaging       Bubba Faustin PA-C  Date: 6/21/2024 Time: 11:07 AM  [facility]    This note was written using fluency dictation software. Please excuse any resulting minor grammatical errors.

## 2024-07-26 ENCOUNTER — TELEPHONE (OUTPATIENT)
Dept: GASTROENTEROLOGY | Facility: MEDICAL CENTER | Age: 79
End: 2024-07-26

## 2024-07-26 ENCOUNTER — OFFICE VISIT (OUTPATIENT)
Dept: GASTROENTEROLOGY | Facility: MEDICAL CENTER | Age: 79
End: 2024-07-26
Payer: MEDICARE

## 2024-07-26 VITALS
WEIGHT: 116.4 LBS | HEIGHT: 61 IN | OXYGEN SATURATION: 97 % | TEMPERATURE: 95.9 F | DIASTOLIC BLOOD PRESSURE: 62 MMHG | HEART RATE: 74 BPM | BODY MASS INDEX: 21.98 KG/M2 | SYSTOLIC BLOOD PRESSURE: 120 MMHG

## 2024-07-26 DIAGNOSIS — R13.19 ESOPHAGEAL DYSPHAGIA: ICD-10-CM

## 2024-07-26 DIAGNOSIS — R10.13 EPIGASTRIC PAIN: Primary | ICD-10-CM

## 2024-07-26 PROCEDURE — 99214 OFFICE O/P EST MOD 30 MIN: CPT | Performed by: NURSE PRACTITIONER

## 2024-07-26 RX ORDER — FAMOTIDINE 40 MG/1
40 TABLET, FILM COATED ORAL DAILY
Qty: 30 TABLET | Refills: 3 | Status: SHIPPED | OUTPATIENT
Start: 2024-07-26

## 2024-07-26 RX ORDER — GABAPENTIN 300 MG/1
300 CAPSULE ORAL
COMMUNITY
Start: 2024-07-08

## 2024-07-26 RX ORDER — DOXYCYCLINE 50 MG/1
50 CAPSULE ORAL DAILY
COMMUNITY
Start: 2024-07-08

## 2024-07-26 RX ORDER — SODIUM FLUORIDE 6.1 MG/ML
1 GEL, DENTIFRICE DENTAL
COMMUNITY
Start: 2024-07-17

## 2024-07-26 RX ORDER — ONDANSETRON 8 MG/1
8 TABLET, ORALLY DISINTEGRATING ORAL EVERY 8 HOURS PRN
COMMUNITY
Start: 2024-07-08

## 2024-07-26 NOTE — TELEPHONE ENCOUNTER
Procedure: EGD  Date: 07/31/2024  Physician performing: Dr. Jaiyeola  Location of procedure:    Instructions given to patient: Yes  Diabetic: No  Clearances: N/A

## 2024-07-26 NOTE — PROGRESS NOTES
[facility] - Outpatient Follow-up Note  Sumi Lee 78 y.o. adult MRN: [id number]  Encounter: [id number]          ASSESSMENT AND PLAN:      1.  Epigastric pain  2.  Belching    Has had a longstanding history of esophageal dysphagia secondary to her Sjogren's disease.  She did undergo an EGD 8/21 which was normal.  A pH/manometry study was recommended at that time but was never done.  She continues with intermittent bouts of esophageal dysphagia with solids that eventually advance with liquids.  She is also started several months ago after being diagnosed with COVID epigastric pain that is better with food.  Reports this pain is sharp and stabbing and it radiates through to her back.  No nausea or vomiting.  Weight has fluctuated up and down by few pounds.  BMs are brown and formed/hard daily to every other day.  She has had a history of chronic constipation since her Sjogren's diagnosis.  Also has increased belching recently.  Rare caffeine use.  Does use NSAIDs regularly.  Rare alcohol use.  She does report that she does have a history of PUD and questional H. pylori in the past.  She took a course of pantoprazole 40 mg daily for several weeks with no help.  She stopped it.  Will rule out H. pylori, PUD, gastritis/esophagitis    -Start Pepcid 40 mg daily  -Antireflux diet  -Stool for H. Pylori  -EGD  -Follow-up in office after testing    I reviewed with patient/family potential risks of endoscopic evaluation including possible infection, bleeding or perforation.  Patient/family verbalized understanding of potential risks and agreed to procedure(s).      _____________________________________________________________________    SUBJECTIVE: 78-year-old female here for follow-up.  She was last seen in the GI office 10/21.  She has a history of IBS-C, dysphagia, carotid stenosis GERD, Sjogren's disease, fibromyalgia and tinnitus.    Has had a longstanding history of esophageal dysphagia  secondary to her Sjogren's disease.  She did undergo an EGD 8/21 which was normal.  A pH/manometry study was recommended at that time but was never done.  She continues with intermittent bouts of esophageal dysphagia with solids that eventually advance with liquids.  She is also started several months ago after being diagnosed with COVID epigastric pain that is better with food.  Reports this pain is sharp and stabbing and it radiates through to her back.  No nausea or vomiting.  Weight has fluctuated up and down by few pounds.  BMs are brown and formed/hard daily to every other day.  She has had a history of chronic constipation since her Sjogren's diagnosis.  Also has increased belching recently.  Rare caffeine use.  Does use NSAIDs intermittently.  Rare alcohol use.    No recent abdominal imaging to review.    Labs 6/24-CMP normal other than alk phos 31, hemoglobin A1c 5.2, TSH 3.85    Prior EGD/colonoscopy     EGD 8/21-Z-line, normal esophagus, stomach and duodenum.    REVIEW OF SYSTEMS IS OTHERWISE NEGATIVE.  10 point review of systems negative other than per HPI      Historical Information   Past Medical History:   Diagnosis Date   • Allergy to adhesive tape     tears skin   • Arthritis    • Back pain     cervical and lumbar   • Chronic cough     from postnasal drip   • Chronic pain disorder    • Claustrophobia    • Coronary artery disease    • Dental disease Forever    Dental implants   • Dependence on nocturnal oxygen therapy     2LNC   • Disease of thyroid gland 1998   • Disorder of upper airway     Wears O2 2 LPM at night- hypoxic at night   • Dizziness 10 years    Currently increasing. Osteoarthritis cervical spine   • Dysphagia     occ   • Fatigue     Fibromyalgia   • Fecal smearing    • Fibromyalgia, primary    • Frequent urination    • Headache    • Headache(784.0) 50 years   • Hearing loss in left ear     has hearing aid   • HL (hearing loss) 2019    Currently lost.   • Hyperlipidemia    • Hypothyroid     • Interstitial cystitis     has interStim   • Irritable bowel syndrome    • Kidney stone    • Macular degeneration    • Microscopic hematuria    • Muscle spasm    • Myofascial pain syndrome    • Nocturnal hypoxia    • Osteoarthritis    • Osteoporosis    • Other allergy, initial encounter     allergy to blue dye   • Pelvic floor dysfunction    • Pelvic pain    • PONV (postoperative nausea and vomiting)     and pruritis   • Rosacea    • Seasonal allergies    • Shingles    • Sinusitis Chronic rhinitis   • Sjogren's disease (HCC)    • Skin cancer     left shoulder BCC in past   • Sleep difficulties    • Spondylisthesis    • Tension headache    • Tinnitus 20years    Getting louder, now also pulsatille tinnitis   • TMJ dysfunction 50 years   • Urinary urgency    • Wears glasses      Past Surgical History:   Procedure Laterality Date   • ADENOIDECTOMY     • APPENDECTOMY     • BONE BIOPSY Right 7/21/2023    Procedure: 2ND TOE MEDIAL MARGIN  EXOSTECTOMY;  Surgeon: Bert Beltrán DPM;  Location: [facility] OR;  Service: Podiatry   • BREAST EXCISIONAL BIOPSY Left 1990 bengin   • CATARACT EXTRACTION Bilateral    • COLONOSCOPY     • ESOPHAGOGASTRODUODENOSCOPY N/A 05/25/2018    Procedure: ESOPHAGOGASTRODUODENOSCOPY (EGD);  Surgeon: Jolene Pierce MD;  Location: [facility] GI LAB;  Service: Gastroenterology   • FL INJECTION LEFT SHOULDER (ARTHROGRAM)  01/05/2022   • HYSTERECTOMY  1971   • IR CEREBRAL ANGIOGRAPHY  10/18/2019   • OTHER SURGICAL HISTORY Left     limb lengthening   • OTHER SURGICAL HISTORY Bilateral 2012, 2014, 2017    interStim   • OTHER SURGICAL HISTORY      tendon contracture right hip   • AK CYSTOURETHROSCOPY N/A 10/04/2017    Procedure: CYSTOSCOPY;  Surgeon: Gallito Forbes MD;  Location: [facility] OR;  Service: Urology   • AK INS/RPLC PERPH SAC/GSTRC NPG/RCVR PCKT CRTJ&CONN N/A 3/8/2023    Procedure: InterStim battery & lead explantation &insertion of new generator & lead MRI compatible;  Surgeon: Gallito Forbes  MD;  [facility];  Service: Urology   • IN NEUROPLASTY NERVE HAND/FOOT Bilateral 7/21/2023    Procedure: EXTERNAL NEUROLYSIS DEEP PERONEAL NERVE;  Surgeon: Bert Beltrán DPM;  Location: [facility];  Service: Podiatry   • SALPINGOOPHORECTOMY  2002   • SEPTOPLASTY  1980    due to fracture  with repair   • TONSILLECTOMY       Social History   Social History     Substance and Sexual Activity   Alcohol Use Not Currently    Comment: few x year     Social History     Substance and Sexual Activity   Drug Use Never     Social History     Tobacco Use   Smoking Status Never   Smokeless Tobacco Never     Family History   Problem Relation Age of Onset   • Endometrial cancer Mother 35   • Cancer Father         Oral cancer   • No Known Problems Sister    • No Known Problems Maternal Grandmother    • No Known Problems Maternal Grandfather    • No Known Problems Paternal Grandmother    • No Known Problems Paternal Grandfather    • Cancer Brother    • No Known Problems Paternal Aunt    • Breast cancer Neg Hx        Meds/Allergies       Current Outpatient Medications:   •  Bempedoic Acid (Nexletol) 180 MG TABS  •  Cholecalciferol (VITAMIN D3 PO)  •  doxycycline monohydrate (MONODOX) 50 mg capsule  •  famotidine (PEPCID) 40 MG tablet  •  gabapentin (NEURONTIN) 300 mg capsule  •  levothyroxine 50 mcg tablet  •  ondansetron (ZOFRAN-ODT) 8 mg disintegrating tablet  •  PreviDent 5000 Dry Mouth 1.1 % GEL  •  Ascorbic Acid (Vitamin C) 500 MG CAPS  •  cevimeline (EVOXAC) 30 MG capsule  •  clobetasol (TEMOVATE) 0.05 % ointment  •  doxycycline hyclate (VIBRAMYCIN) 50 mg capsule  •  hydroxychloroquine (PLAQUENIL) 200 mg tablet  •  hydroxychloroquine (PLAQUENIL) 200 mg tablet  •  methylPREDNISolone (Medrol) 16 mg tablet  •  Multiple Vitamin (MULTIVITAMIN ADULT PO)  •  pantoprazole (PROTONIX) 20 mg tablet  •  predniSONE 20 mg tablet    Allergies   Allergen Reactions   • Medical Tape Blisters and Rash     blisters    **adhesive  "tape      Blisters, rash, skin tearing   • Atorvastatin Myalgia     Muscle aches.    Muscle aches.    Any statins she cannot tolerate   • Sulfa Antibiotics Rash and Itching           Objective     Blood pressure 120/62, pulse 74, temperature (!) 95.9 °F (35.5 °C), temperature source Tympanic, height 5' 1\" (1.549 m), weight 52.8 kg (116 lb 6.4 oz), SpO2 97%, not currently breastfeeding. Body mass index is 21.99 kg/m².      PHYSICAL EXAM:      General Appearance:   Alert, cooperative, no distress   HEENT:   Normocephalic, atraumatic, anicteric.     Neck:  Supple, symmetrical, trachea midline   Lungs:   Clear to auscultation bilaterally; no rales, rhonchi or wheezing; respirations unlabored    Heart::   Regular rate and rhythm; no murmur, rub, or gallop.   Abdomen:   Soft, non-tender, non-distended; normal bowel sounds; no masses, no organomegaly    Genitalia:   Deferred    Rectal:   Deferred    Extremities:  No cyanosis, clubbing or edema    Pulses:  2+ and symmetric    Skin:  No jaundice, rashes, or lesions    Lymph nodes:  No palpable cervical lymphadenopathy        Lab Results:   No visits with results within 1 Day(s) from this visit.   Latest known visit with results is:   Appointment on 04/12/2023   Component Date Value   • WBC 04/12/2023 15.80 (H)    • RBC 04/12/2023 4.24    • Hemoglobin 04/12/2023 12.8    • Hematocrit 04/12/2023 40.3    • MCV 04/12/2023 95    • MCH 04/12/2023 30.2    • MCHC 04/12/2023 31.8    • RDW 04/12/2023 11.8    • MPV 04/12/2023 9.2    • Platelets 04/12/2023 321    • nRBC 04/12/2023 0    • Segmented % 04/12/2023 83 (H)    • Immature Grans % 04/12/2023 1    • Lymphocytes % 04/12/2023 8 (L)    • Monocytes % 04/12/2023 6    • Eosinophils Relative 04/12/2023 1    • Basophils Relative 04/12/2023 1    • Absolute Neutrophils 04/12/2023 13.19 (H)    • Absolute Immature Grans 04/12/2023 0.18    • Absolute Lymphocytes 04/12/2023 1.28    • Absolute Monocytes 04/12/2023 0.90    • Eosinophils Absolute " 04/12/2023 0.17    • Basophils Absolute 04/12/2023 0.08    • Sodium 04/12/2023 139    • Potassium 04/12/2023 3.8    • Chloride 04/12/2023 104    • CO2 04/12/2023 29    • ANION GAP 04/12/2023 6    • BUN 04/12/2023 15    • Creatinine 04/12/2023 0.58 (L)    • Glucose 04/12/2023 87    • Calcium 04/12/2023 9.3          Radiology Results:   No results found.

## 2024-07-26 NOTE — H&P (VIEW-ONLY)
[facility] - Outpatient Follow-up Note  Sumi Lee 78 y.o. adult MRN: [id number]  Encounter: [id number]          ASSESSMENT AND PLAN:      1.  Epigastric pain  2.  Belching    Has had a longstanding history of esophageal dysphagia secondary to her Sjogren's disease.  She did undergo an EGD 8/21 which was normal.  A pH/manometry study was recommended at that time but was never done.  She continues with intermittent bouts of esophageal dysphagia with solids that eventually advance with liquids.  She is also started several months ago after being diagnosed with COVID epigastric pain that is better with food.  Reports this pain is sharp and stabbing and it radiates through to her back.  No nausea or vomiting.  Weight has fluctuated up and down by few pounds.  BMs are brown and formed/hard daily to every other day.  She has had a history of chronic constipation since her Sjogren's diagnosis.  Also has increased belching recently.  Rare caffeine use.  Does use NSAIDs regularly.  Rare alcohol use.  She does report that she does have a history of PUD and questional H. pylori in the past.  She took a course of pantoprazole 40 mg daily for several weeks with no help.  She stopped it.  Will rule out H. pylori, PUD, gastritis/esophagitis    -Start Pepcid 40 mg daily  -Antireflux diet  -Stool for H. Pylori  -EGD  -Follow-up in office after testing    I reviewed with patient/family potential risks of endoscopic evaluation including possible infection, bleeding or perforation.  Patient/family verbalized understanding of potential risks and agreed to procedure(s).      _____________________________________________________________________    SUBJECTIVE: 78-year-old female here for follow-up.  She was last seen in the GI office 10/21.  She has a history of IBS-C, dysphagia, carotid stenosis GERD, Sjogren's disease, fibromyalgia and tinnitus.    Has had a longstanding history of esophageal dysphagia  secondary to her Sjogren's disease.  She did undergo an EGD 8/21 which was normal.  A pH/manometry study was recommended at that time but was never done.  She continues with intermittent bouts of esophageal dysphagia with solids that eventually advance with liquids.  She is also started several months ago after being diagnosed with COVID epigastric pain that is better with food.  Reports this pain is sharp and stabbing and it radiates through to her back.  No nausea or vomiting.  Weight has fluctuated up and down by few pounds.  BMs are brown and formed/hard daily to every other day.  She has had a history of chronic constipation since her Sjogren's diagnosis.  Also has increased belching recently.  Rare caffeine use.  Does use NSAIDs intermittently.  Rare alcohol use.    No recent abdominal imaging to review.    Labs 6/24-CMP normal other than alk phos 31, hemoglobin A1c 5.2, TSH 3.85    Prior EGD/colonoscopy     EGD 8/21-Z-line, normal esophagus, stomach and duodenum.    REVIEW OF SYSTEMS IS OTHERWISE NEGATIVE.  10 point review of systems negative other than per HPI      Historical Information   Past Medical History:   Diagnosis Date   • Allergy to adhesive tape     tears skin   • Arthritis    • Back pain     cervical and lumbar   • Chronic cough     from postnasal drip   • Chronic pain disorder    • Claustrophobia    • Coronary artery disease    • Dental disease Forever    Dental implants   • Dependence on nocturnal oxygen therapy     2LNC   • Disease of thyroid gland 1998   • Disorder of upper airway     Wears O2 2 LPM at night- hypoxic at night   • Dizziness 10 years    Currently increasing. Osteoarthritis cervical spine   • Dysphagia     occ   • Fatigue     Fibromyalgia   • Fecal smearing    • Fibromyalgia, primary    • Frequent urination    • Headache    • Headache(784.0) 50 years   • Hearing loss in left ear     has hearing aid   • HL (hearing loss) 2019    Currently lost.   • Hyperlipidemia    • Hypothyroid     • Interstitial cystitis     has interStim   • Irritable bowel syndrome    • Kidney stone    • Macular degeneration    • Microscopic hematuria    • Muscle spasm    • Myofascial pain syndrome    • Nocturnal hypoxia    • Osteoarthritis    • Osteoporosis    • Other allergy, initial encounter     allergy to blue dye   • Pelvic floor dysfunction    • Pelvic pain    • PONV (postoperative nausea and vomiting)     and pruritis   • Rosacea    • Seasonal allergies    • Shingles    • Sinusitis Chronic rhinitis   • Sjogren's disease (HCC)    • Skin cancer     left shoulder BCC in past   • Sleep difficulties    • Spondylisthesis    • Tension headache    • Tinnitus 20years    Getting louder, now also pulsatille tinnitis   • TMJ dysfunction 50 years   • Urinary urgency    • Wears glasses      Past Surgical History:   Procedure Laterality Date   • ADENOIDECTOMY     • APPENDECTOMY     • BONE BIOPSY Right 7/21/2023    Procedure: 2ND TOE MEDIAL MARGIN  EXOSTECTOMY;  Surgeon: Bert Beltrán DPM;  Location: [facility] OR;  Service: Podiatry   • BREAST EXCISIONAL BIOPSY Left 1990 bengin   • CATARACT EXTRACTION Bilateral    • COLONOSCOPY     • ESOPHAGOGASTRODUODENOSCOPY N/A 05/25/2018    Procedure: ESOPHAGOGASTRODUODENOSCOPY (EGD);  Surgeon: Jolene Pierce MD;  Location: [facility] GI LAB;  Service: Gastroenterology   • FL INJECTION LEFT SHOULDER (ARTHROGRAM)  01/05/2022   • HYSTERECTOMY  1971   • IR CEREBRAL ANGIOGRAPHY  10/18/2019   • OTHER SURGICAL HISTORY Left     limb lengthening   • OTHER SURGICAL HISTORY Bilateral 2012, 2014, 2017    interStim   • OTHER SURGICAL HISTORY      tendon contracture right hip   • MI CYSTOURETHROSCOPY N/A 10/04/2017    Procedure: CYSTOSCOPY;  Surgeon: Gallito Forbes MD;  Location: [facility] OR;  Service: Urology   • MI INS/RPLC PERPH SAC/GSTRC NPG/RCVR PCKT CRTJ&CONN N/A 3/8/2023    Procedure: InterStim battery & lead explantation &insertion of new generator & lead MRI compatible;  Surgeon: Gallito Forbes  MD;  [facility];  Service: Urology   • FL NEUROPLASTY NERVE HAND/FOOT Bilateral 7/21/2023    Procedure: EXTERNAL NEUROLYSIS DEEP PERONEAL NERVE;  Surgeon: Bert Beltrán DPM;  Location[facility];  Service: Podiatry   • SALPINGOOPHORECTOMY  2002   • SEPTOPLASTY  1980    due to fracture  with repair   • TONSILLECTOMY       Social History   Social History     Substance and Sexual Activity   Alcohol Use Not Currently    Comment: few x year     Social History     Substance and Sexual Activity   Drug Use Never     Social History     Tobacco Use   Smoking Status Never   Smokeless Tobacco Never     Family History   Problem Relation Age of Onset   • Endometrial cancer Mother 35   • Cancer Father         Oral cancer   • No Known Problems Sister    • No Known Problems Maternal Grandmother    • No Known Problems Maternal Grandfather    • No Known Problems Paternal Grandmother    • No Known Problems Paternal Grandfather    • Cancer Brother    • No Known Problems Paternal Aunt    • Breast cancer Neg Hx        Meds/Allergies       Current Outpatient Medications:   •  Bempedoic Acid (Nexletol) 180 MG TABS  •  Cholecalciferol (VITAMIN D3 PO)  •  doxycycline monohydrate (MONODOX) 50 mg capsule  •  famotidine (PEPCID) 40 MG tablet  •  gabapentin (NEURONTIN) 300 mg capsule  •  levothyroxine 50 mcg tablet  •  ondansetron (ZOFRAN-ODT) 8 mg disintegrating tablet  •  PreviDent 5000 Dry Mouth 1.1 % GEL  •  Ascorbic Acid (Vitamin C) 500 MG CAPS  •  cevimeline (EVOXAC) 30 MG capsule  •  clobetasol (TEMOVATE) 0.05 % ointment  •  doxycycline hyclate (VIBRAMYCIN) 50 mg capsule  •  hydroxychloroquine (PLAQUENIL) 200 mg tablet  •  hydroxychloroquine (PLAQUENIL) 200 mg tablet  •  methylPREDNISolone (Medrol) 16 mg tablet  •  Multiple Vitamin (MULTIVITAMIN ADULT PO)  •  pantoprazole (PROTONIX) 20 mg tablet  •  predniSONE 20 mg tablet    Allergies   Allergen Reactions   • Medical Tape Blisters and Rash     blisters    **adhesive  "tape      Blisters, rash, skin tearing   • Atorvastatin Myalgia     Muscle aches.    Muscle aches.    Any statins she cannot tolerate   • Sulfa Antibiotics Rash and Itching           Objective     Blood pressure 120/62, pulse 74, temperature (!) 95.9 °F (35.5 °C), temperature source Tympanic, height 5' 1\" (1.549 m), weight 52.8 kg (116 lb 6.4 oz), SpO2 97%, not currently breastfeeding. Body mass index is 21.99 kg/m².      PHYSICAL EXAM:      General Appearance:   Alert, cooperative, no distress   HEENT:   Normocephalic, atraumatic, anicteric.     Neck:  Supple, symmetrical, trachea midline   Lungs:   Clear to auscultation bilaterally; no rales, rhonchi or wheezing; respirations unlabored    Heart::   Regular rate and rhythm; no murmur, rub, or gallop.   Abdomen:   Soft, non-tender, non-distended; normal bowel sounds; no masses, no organomegaly    Genitalia:   Deferred    Rectal:   Deferred    Extremities:  No cyanosis, clubbing or edema    Pulses:  2+ and symmetric    Skin:  No jaundice, rashes, or lesions    Lymph nodes:  No palpable cervical lymphadenopathy        Lab Results:   No visits with results within 1 Day(s) from this visit.   Latest known visit with results is:   Appointment on 04/12/2023   Component Date Value   • WBC 04/12/2023 15.80 (H)    • RBC 04/12/2023 4.24    • Hemoglobin 04/12/2023 12.8    • Hematocrit 04/12/2023 40.3    • MCV 04/12/2023 95    • MCH 04/12/2023 30.2    • MCHC 04/12/2023 31.8    • RDW 04/12/2023 11.8    • MPV 04/12/2023 9.2    • Platelets 04/12/2023 321    • nRBC 04/12/2023 0    • Segmented % 04/12/2023 83 (H)    • Immature Grans % 04/12/2023 1    • Lymphocytes % 04/12/2023 8 (L)    • Monocytes % 04/12/2023 6    • Eosinophils Relative 04/12/2023 1    • Basophils Relative 04/12/2023 1    • Absolute Neutrophils 04/12/2023 13.19 (H)    • Absolute Immature Grans 04/12/2023 0.18    • Absolute Lymphocytes 04/12/2023 1.28    • Absolute Monocytes 04/12/2023 0.90    • Eosinophils Absolute " 04/12/2023 0.17    • Basophils Absolute 04/12/2023 0.08    • Sodium 04/12/2023 139    • Potassium 04/12/2023 3.8    • Chloride 04/12/2023 104    • CO2 04/12/2023 29    • ANION GAP 04/12/2023 6    • BUN 04/12/2023 15    • Creatinine 04/12/2023 0.58 (L)    • Glucose 04/12/2023 87    • Calcium 04/12/2023 9.3          Radiology Results:   No results found.

## 2024-07-31 ENCOUNTER — ANESTHESIA EVENT (OUTPATIENT)
Dept: GASTROENTEROLOGY | Facility: HOSPITAL | Age: 79
End: 2024-07-31

## 2024-07-31 ENCOUNTER — HOSPITAL ENCOUNTER (OUTPATIENT)
Dept: GASTROENTEROLOGY | Facility: HOSPITAL | Age: 79
Setting detail: OUTPATIENT SURGERY
Discharge: HOME | End: 2024-07-31
Payer: MEDICARE

## 2024-07-31 ENCOUNTER — ANESTHESIA (OUTPATIENT)
Dept: GASTROENTEROLOGY | Facility: HOSPITAL | Age: 79
End: 2024-07-31

## 2024-07-31 VITALS
OXYGEN SATURATION: 97 % | HEART RATE: 76 BPM | SYSTOLIC BLOOD PRESSURE: 129 MMHG | DIASTOLIC BLOOD PRESSURE: 88 MMHG | RESPIRATION RATE: 15 BRPM | TEMPERATURE: 98.3 F

## 2024-07-31 DIAGNOSIS — R10.13 EPIGASTRIC PAIN: ICD-10-CM

## 2024-07-31 PROBLEM — Z98.890 POST-OPERATIVE STATE: Status: RESOLVED | Noted: 2023-07-21 | Resolved: 2024-07-31

## 2024-07-31 PROCEDURE — 43239 EGD BIOPSY SINGLE/MULTIPLE: CPT | Performed by: INTERNAL MEDICINE

## 2024-07-31 PROCEDURE — 88305 TISSUE EXAM BY PATHOLOGIST: CPT | Performed by: STUDENT IN AN ORGANIZED HEALTH CARE EDUCATION/TRAINING PROGRAM

## 2024-07-31 PROCEDURE — 88313 SPECIAL STAINS GROUP 2: CPT | Performed by: STUDENT IN AN ORGANIZED HEALTH CARE EDUCATION/TRAINING PROGRAM

## 2024-07-31 RX ORDER — LIDOCAINE HYDROCHLORIDE 10 MG/ML
INJECTION, SOLUTION EPIDURAL; INFILTRATION; INTRACAUDAL; PERINEURAL AS NEEDED
Status: DISCONTINUED | OUTPATIENT
Start: 2024-07-31 | End: 2024-07-31

## 2024-07-31 RX ORDER — OMEPRAZOLE 40 MG/1
40 CAPSULE, DELAYED RELEASE ORAL
Qty: 90 CAPSULE | Refills: 3 | Status: SHIPPED | OUTPATIENT
Start: 2024-07-31

## 2024-07-31 RX ORDER — SODIUM CHLORIDE, SODIUM LACTATE, POTASSIUM CHLORIDE, CALCIUM CHLORIDE 600; 310; 30; 20 MG/100ML; MG/100ML; MG/100ML; MG/100ML
INJECTION, SOLUTION INTRAVENOUS CONTINUOUS PRN
Status: DISCONTINUED | OUTPATIENT
Start: 2024-07-31 | End: 2024-07-31

## 2024-07-31 RX ORDER — SODIUM CHLORIDE, SODIUM LACTATE, POTASSIUM CHLORIDE, CALCIUM CHLORIDE 600; 310; 30; 20 MG/100ML; MG/100ML; MG/100ML; MG/100ML
75 INJECTION, SOLUTION INTRAVENOUS CONTINUOUS
Status: CANCELLED | OUTPATIENT
Start: 2024-07-31

## 2024-07-31 RX ORDER — PROPOFOL 10 MG/ML
INJECTION, EMULSION INTRAVENOUS AS NEEDED
Status: DISCONTINUED | OUTPATIENT
Start: 2024-07-31 | End: 2024-07-31

## 2024-07-31 RX ADMIN — PROPOFOL 50 MG: 10 INJECTION, EMULSION INTRAVENOUS at 13:42

## 2024-07-31 RX ADMIN — LIDOCAINE HYDROCHLORIDE 50 MG: 10 INJECTION, SOLUTION EPIDURAL; INFILTRATION; INTRACAUDAL; PERINEURAL at 13:39

## 2024-07-31 RX ADMIN — PROPOFOL 100 MG: 10 INJECTION, EMULSION INTRAVENOUS at 13:39

## 2024-07-31 RX ADMIN — SODIUM CHLORIDE, SODIUM LACTATE, POTASSIUM CHLORIDE, AND CALCIUM CHLORIDE: .6; .31; .03; .02 INJECTION, SOLUTION INTRAVENOUS at 12:57

## 2024-07-31 NOTE — ANESTHESIA PREPROCEDURE EVALUATION
Procedure:  EGD    Relevant Problems   ANESTHESIA   (+) PONV (postoperative nausea and vomiting)      CARDIO   (+) Hyperlipidemia   (+) Stenosis of left internal carotid artery   (-) Chest pain      ENDO   (+) Hypothyroidism      GI/HEPATIC   (+) Esophageal dysphagia   (+) Gastroesophageal reflux disease without esophagitis      PULMONARY   (+) Idiopathic sleep related nonobstructive alveolar hypoventilation   (-) URI (upper respiratory infection)      Respiratory/Allergy   (+) Upper airway resistance syndrome (2L NC qhs)        Physical Exam    Airway    Mallampati score: II  TM Distance: >3 FB  Neck ROM: full     Dental       Cardiovascular      Pulmonary      Other Findings  post-pubertal.      Anesthesia Plan  ASA Score- 2     Anesthesia Type- IV sedation with anesthesia with ASA Monitors.         Additional Monitors:     Airway Plan:            Plan Factors-Exercise tolerance (METS): >4 METS.    Chart reviewed. EKG reviewed.  Existing labs reviewed. Patient summary reviewed.                  Induction- intravenous.    Postoperative Plan-         Informed Consent- Anesthetic plan and risks discussed with patient.  I personally reviewed this patient with the CRNA. Discussed and agreed on the Anesthesia Plan with the CRNA..

## 2024-07-31 NOTE — INTERVAL H&P NOTE
H&P reviewed. After examining the patient I find no changes in the patients condition since the H&P had been written.    Vitals:    07/31/24 1242   BP: 152/64   Pulse: 76   Resp: 15   Temp: 98.5 °F (36.9 °C)   SpO2: 98%

## 2024-08-06 PROCEDURE — 88313 SPECIAL STAINS GROUP 2: CPT | Performed by: STUDENT IN AN ORGANIZED HEALTH CARE EDUCATION/TRAINING PROGRAM

## 2024-08-06 PROCEDURE — 88305 TISSUE EXAM BY PATHOLOGIST: CPT | Performed by: STUDENT IN AN ORGANIZED HEALTH CARE EDUCATION/TRAINING PROGRAM

## 2024-08-07 DIAGNOSIS — R11.0 NAUSEA: Primary | ICD-10-CM

## 2024-08-07 RX ORDER — ONDANSETRON 4 MG/1
4 TABLET, FILM COATED ORAL EVERY 8 HOURS PRN
Qty: 30 TABLET | Refills: 2 | Status: SHIPPED | OUTPATIENT
Start: 2024-08-07

## 2024-08-17 DIAGNOSIS — R10.13 EPIGASTRIC PAIN: ICD-10-CM

## 2024-08-18 RX ORDER — FAMOTIDINE 40 MG/1
40 TABLET, FILM COATED ORAL DAILY
Qty: 90 TABLET | Refills: 1 | Status: SHIPPED | OUTPATIENT
Start: 2024-08-18

## 2024-10-21 ENCOUNTER — EVALUATION (OUTPATIENT)
Dept: PHYSICAL THERAPY | Facility: MEDICAL CENTER | Age: 79
End: 2024-10-21
Payer: MEDICARE

## 2024-10-21 VITALS
DIASTOLIC BLOOD PRESSURE: 62 MMHG | SYSTOLIC BLOOD PRESSURE: 114 MMHG | HEART RATE: 68 BPM | TEMPERATURE: 97.6 F | OXYGEN SATURATION: 97 %

## 2024-10-21 DIAGNOSIS — R51.9 CRANIOFACIAL PAIN: ICD-10-CM

## 2024-10-21 DIAGNOSIS — G51.8 CRANIOFACIAL PAIN SYNDROME: Primary | ICD-10-CM

## 2024-10-21 DIAGNOSIS — G50.1 ATYPICAL FACIAL PAIN: ICD-10-CM

## 2024-10-21 DIAGNOSIS — G44.86 CERVICOGENIC HEADACHE: ICD-10-CM

## 2024-10-21 PROCEDURE — 97163 PT EVAL HIGH COMPLEX 45 MIN: CPT | Performed by: PHYSICAL THERAPIST

## 2024-10-21 PROCEDURE — 97112 NEUROMUSCULAR REEDUCATION: CPT | Performed by: PHYSICAL THERAPIST

## 2024-10-21 NOTE — PROGRESS NOTES
PT Evaluation     Today's date: 10/21/2024  Patient name: Sumi Lee  : 1945  MRN: [id number]  Referring provider: Harvinder Ventura MD  Dx:   Encounter Diagnosis     ICD-10-CM    1. 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 Craniofacial pain  R51.9       3. Cervicogenic headache  G44.86       4. 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 female who presents with chronic craniofacial pain that has been worsening over the past year.   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  I expect she improve slowly if she adheres to the home program x 18 months along with 6 visits over the next 4 months.        Comparable signs:  1) eating  2) chewing  3) opening her mouth  4) pain down R side of head    Goals  Patient will be independent with home exercise program.   Patient will be able to manage symptoms independently.   Patient will be able to eat without limitation due to pain.   Patient will be able to chew without limitation due to pain.       Subjective Evaluation    History of Present Illness  Mechanism of injury: Chronic, recurrent jaw problem that has been worsening over the past year.  She saw a TMJ specialist dentist in [facility] but is trying to get to the point that she does not need to return as the injections she received did not help much.   Quality of life: good    Patient Goals  Patient goals for therapy: decreased pain, increased motion, independence with ADLs/IADLs and return to sport/leisure  activities    Pain  At worst pain rating: 10        Objective     Static Posture     Head  Forward.    Shoulders  Rounded.    Postural Observations  Seated posture: poor  Standing posture: fair      Palpation   Left   Hypertonic in the levator scapulae, scalenes, sternocleidomastoid, suboccipitals, upper trapezius, masseter, temporalis, lateral pterygoid and medial pterygoid.   Tenderness of the levator scapulae, scalenes, sternocleidomastoid, suboccipitals, upper trapezius, masseter, temporalis, lateral pterygoid and medial pterygoid.     Right   Hypertonic in the levator scapulae, scalenes, sternocleidomastoid, suboccipitals, upper trapezius, masseter, temporalis, lateral pterygoid and medial pterygoid.   Tenderness of the levator scapulae, scalenes, sternocleidomastoid, suboccipitals, upper trapezius, masseter, temporalis, lateral pterygoid and medial pterygoid.     Neurological Testing     Sensation   Cervical/Thoracic   Left   Intact: light touch    Right   Intact: light touch    Reflexes   Left   Biceps (C5/C6): normal (2+)  Mcconnell's reflex: negative    Right   Biceps (C5/C6): normal (2+)  Mcconnell's reflex: negative    Active Range of Motion   Cervical/Thoracic Spine       Cervical    Flexion:  with pain Restriction level: maximal  Extension:  with pain Restriction level: maximal  Left lateral flexion:  with pain Restriction level: maximal  Right lateral flexion:  with pain Restriction level maximal  Left rotation:  with pain Restriction level: maximal  Right rotation:  with pain Restriction level: maximal  Left Shoulder   External rotation BTH: C2 with pain  Internal rotation BTB: mid thoracic with pain    Right Shoulder   External rotation BTH: C4 with pain  Internal rotation BTB: sacrum with pain    Left Elbow   Normal active range of motion    Right Elbow   Normal active range of motion    Left Wrist   Normal active range of motion    Right Wrist   Normal active range of motion    Passive Range of Motion    Left Shoulder   Flexion: 90 degrees   Abduction: 90 degrees   External rotation 45°: 40 degrees   Internal rotation 45°: 40 degrees     Right Shoulder   Flexion: 60 degrees   Abduction: 80 degrees   External rotation 45°: 80 degrees   Internal rotation 45°: 40 degrees     Joint Play     Hypomobile: C1, C2, C3, C4, C5, C6, C7 and T1     Comments: Empty end feel throughout    Strength/Myotome Testing     Additional Strength Details  Grossly 3+/5 bilaterally    Tests   Cervical   Positive cervical distraction test and craniocervical flexion test.    Left   Negative Spurling's Test A.     Right   Negative Spurling's Test A.     Left Shoulder   Negative ULTT1.     Right Shoulder   Negative ULTT1.     Ambulation   Weight-Bearing Status   Weight-Bearing Status (Left): full weight bearing   Weight-Bearing Status (Right): full weight-bearing      Observational Gait   Gait: within functional limits   TMJ   Jaw observations: facial symmetry within normal limits  Occlusion class: class I (normal)  Patient does not have a  cleft palate  Scalloping of tongue: yes  Cusp wear: yes  Jaw trauma: no  Prognathia: no  Retrognathia: no  Mursicatio buccarum: yes  Lateral bite test, Left: no pain  Lateral bite test, right: no pain  ROM: pain with movement  Opening (mm): 16   Lateral excursion, left (mm): 8 and pain  Lateral excursion, right (mm)t: 5 and pain   ROM comments: Measurements fluctuate considerably between repetitions             Precautions: High symptom irritability, central sensitization and anxiety  Date: 10/21        Manual            MFR to craniocervical mm.                                                           Neuromuscular Re-education           TMJ relaxed position SK        TMJ controlled opening SK        Cervical retraction SK        Levator labii contractions SK        TMJ lateral excursion          TMJ rhythmic stab                                  Therapeutic Exercise                                                            Therapeutic Activities                                   Gait Training                                   Modalities

## 2024-10-21 NOTE — LETTER
2024    Harvinder Ventura MD  [address]  [address]  [place] [facility] [address]    Patient: Sumi Lee   YOB: 1945   Date of Visit: 10/21/2024     Encounter Diagnosis     ICD-10-CM    1. 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 Craniofacial pain  R51.9       3. Cervicogenic headache  G44.86       4. 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 Ventura:    Thank you for your recent referral of Sumi Lee. Please review the attached evaluation summary from Sumi's recent visit.     Please verify that you agree with the plan of care by signing the attached order.     If you have any questions or concerns, please do not hesitate to call.     I sincerely appreciate the opportunity to share in the care of one of your patients and hope to have another opportunity to work with you in the near future.       Sincerely,    Sukhdev Roach, PT      Referring Provider:      I certify that I have read the below Plan of Care and certify the need for these services furnished under this plan of treatment while under my care.                    Harvinder Ventura MD  [address]  [address]  [place] [address]  Via Fax: [phone]          PT Evaluation     Today's date: 10/21/2024  Patient name: Sumi Lee  : 1945  MRN: [id number]  Referring provider: aHrvinder Ventura MD  Dx:   Encounter Diagnosis     ICD-10-CM    1. 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 Craniofacial pain  R51.9       3. Cervicogenic headache  G44.86       4. 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 female who presents with chronic craniofacial pain that has been worsening over the past year.   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  I expect she improve slowly if she adheres to the home program x 18 months along with 6 visits over the next 4 months.        Comparable signs:  1) eating  2) chewing  3) opening her mouth  4) pain down R side of head    Goals  Patient will be independent with home exercise program.   Patient will be able to manage symptoms independently.   Patient will be able to eat without limitation due to pain.   Patient will be able to chew without limitation due to pain.       Subjective Evaluation    History of Present Illness  Mechanism of injury: Chronic, recurrent jaw problem that has been worsening over the past year.  She saw a TMJ specialist dentist in [facility] but is trying to get to the point that she does not need to return as the injections she received did not help much.   Quality of life: good    Patient Goals  Patient goals for therapy: decreased pain, increased motion, independence with ADLs/IADLs and return to sport/leisure activities    Pain  At worst pain rating: 10        Objective     Static Posture     Head  Forward.    Shoulders  Rounded.    Postural Observations  Seated posture: poor  Standing posture: fair      Palpation   Left   Hypertonic in the levator scapulae, scalenes, sternocleidomastoid, suboccipitals, upper trapezius, masseter, temporalis, lateral pterygoid and medial pterygoid.   Tenderness of the levator scapulae, scalenes, sternocleidomastoid, suboccipitals, upper trapezius, masseter, temporalis, lateral pterygoid and medial pterygoid.     Right   Hypertonic in the levator scapulae, scalenes, sternocleidomastoid, suboccipitals, upper trapezius, masseter, temporalis, lateral pterygoid and medial pterygoid.   Tenderness of the levator scapulae, scalenes,  sternocleidomastoid, suboccipitals, upper trapezius, masseter, temporalis, lateral pterygoid and medial pterygoid.     Neurological Testing     Sensation   Cervical/Thoracic   Left   Intact: light touch    Right   Intact: light touch    Reflexes   Left   Biceps (C5/C6): normal (2+)  Mcconnell's reflex: negative    Right   Biceps (C5/C6): normal (2+)  Mcconnell's reflex: negative    Active Range of Motion   Cervical/Thoracic Spine       Cervical    Flexion:  with pain Restriction level: maximal  Extension:  with pain Restriction level: maximal  Left lateral flexion:  with pain Restriction level: maximal  Right lateral flexion:  with pain Restriction level maximal  Left rotation:  with pain Restriction level: maximal  Right rotation:  with pain Restriction level: maximal  Left Shoulder   External rotation BTH: C2 with pain  Internal rotation BTB: mid thoracic with pain    Right Shoulder   External rotation BTH: C4 with pain  Internal rotation BTB: sacrum with pain    Left Elbow   Normal active range of motion    Right Elbow   Normal active range of motion    Left Wrist   Normal active range of motion    Right Wrist   Normal active range of motion    Passive Range of Motion   Left Shoulder   Flexion: 90 degrees   Abduction: 90 degrees   External rotation 45°: 40 degrees   Internal rotation 45°: 40 degrees     Right Shoulder   Flexion: 60 degrees   Abduction: 80 degrees   External rotation 45°: 80 degrees   Internal rotation 45°: 40 degrees     Joint Play     Hypomobile: C1, C2, C3, C4, C5, C6, C7 and T1     Comments: Empty end feel throughout    Strength/Myotome Testing     Additional Strength Details  Grossly 3+/5 bilaterally    Tests   Cervical   Positive cervical distraction test and craniocervical flexion test.    Left   Negative Spurling's Test A.     Right   Negative Spurling's Test A.     Left Shoulder   Negative ULTT1.     Right Shoulder   Negative ULTT1.     Ambulation   Weight-Bearing Status   Weight-Bearing  Status (Left): full weight bearing   Weight-Bearing Status (Right): full weight-bearing      Observational Gait   Gait: within functional limits   TMJ   Jaw observations: facial symmetry within normal limits  Occlusion class: class I (normal)  Patient does not have a  cleft palate  Scalloping of tongue: yes  Cusp wear: yes  Jaw trauma: no  Prognathia: no  Retrognathia: no  Mursicatio buccarum: yes  Lateral bite test, Left: no pain  Lateral bite test, right: no pain  ROM: pain with movement  Opening (mm): 16   Lateral excursion, left (mm): 8 and pain  Lateral excursion, right (mm)t: 5 and pain   ROM comments: Measurements fluctuate considerably between repetitions             Precautions: High symptom irritability, central sensitization and anxiety  Date: 10/21        Manual            MFR to craniocervical mm.                                                           Neuromuscular Re-education           TMJ relaxed position SK        TMJ controlled opening SK        Cervical retraction SK        Levator labii contractions SK        TMJ lateral excursion          TMJ rhythmic stab                                  Therapeutic Exercise                                                           Therapeutic Activities                                   Gait Training                                   Modalities

## 2024-10-26 ENCOUNTER — PATIENT MESSAGE (OUTPATIENT)
Dept: GASTROENTEROLOGY | Facility: MEDICAL CENTER | Age: 79
End: 2024-10-26

## 2024-10-26 DIAGNOSIS — R10.13 EPIGASTRIC PAIN: ICD-10-CM

## 2024-10-26 DIAGNOSIS — R11.0 NAUSEA: ICD-10-CM

## 2024-10-28 RX ORDER — ONDANSETRON 4 MG/1
4 TABLET, FILM COATED ORAL EVERY 8 HOURS PRN
Qty: 30 TABLET | Refills: 0 | Status: SHIPPED | OUTPATIENT
Start: 2024-10-28

## 2024-10-28 RX ORDER — FAMOTIDINE 40 MG/1
40 TABLET, FILM COATED ORAL DAILY
Qty: 90 TABLET | Refills: 1 | Status: SHIPPED | OUTPATIENT
Start: 2024-10-28

## 2024-11-11 ENCOUNTER — OFFICE VISIT (OUTPATIENT)
Dept: PHYSICAL THERAPY | Facility: MEDICAL CENTER | Age: 79
End: 2024-11-11
Payer: MEDICARE

## 2024-11-11 DIAGNOSIS — G51.8 CRANIOFACIAL PAIN SYNDROME: Primary | ICD-10-CM

## 2024-11-11 DIAGNOSIS — G44.86 CERVICOGENIC HEADACHE: ICD-10-CM

## 2024-11-11 DIAGNOSIS — R51.9 CRANIOFACIAL PAIN: ICD-10-CM

## 2024-11-11 PROCEDURE — 97112 NEUROMUSCULAR REEDUCATION: CPT | Performed by: PHYSICAL THERAPIST

## 2024-11-11 PROCEDURE — 97140 MANUAL THERAPY 1/> REGIONS: CPT | Performed by: PHYSICAL THERAPIST

## 2024-11-11 NOTE — PROGRESS NOTES
Daily Note     Today's date: 2024  Patient name: Sumi Lee  : 1945  MRN: [id number]  Referring provider: Harvinder Ventura MD  Dx:   Encounter Diagnosis     ICD-10-CM    1. 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 Craniofacial pain  R51.9       3. 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 - in progress  Patient will be able to manage symptoms independently. - in progress  Patient will be able to eat without limitation due to pain. - in progress  Patient will be able to chew without limitation due to pain. - in progress      Plan: Reassess in 4 weeks.      Subjective: Patient reports she was in [place] for the past 2 weeks and had a wonderful time, but notes she had a lot of difficulty with eating and feels her jaw has gotten tighter.  She also was not able to do the home program much.      Objective: See treatment diary below  ROM: pain with movement  Opening (mm): 15 inconsistently  Lateral excursion, left (mm): 4 and pain  Lateral excursion, right (mm): 4 and pain   ROM comments: Measurements fluctuate considerably between repetitions    Precautions: High symptom irritability, central sensitization and anxiety  Date:         Manual            MFR to craniocervical mm.  SK                                                         Neuromuscular Re-education           TMJ  relaxed position SK        TMJ controlled opening SK        Cervical retraction SK        Levator labii contractions SK        TMJ lateral excursion          TMJ rhythmic stab           vowel enunciation SK                     Therapeutic Exercise                                                           Therapeutic Activities                                   Gait Training                                   Modalities

## 2024-11-19 ENCOUNTER — EVALUATION (OUTPATIENT)
Dept: SPEECH THERAPY | Facility: REHABILITATION | Age: 79
End: 2024-11-19
Payer: MEDICARE

## 2024-11-19 DIAGNOSIS — G51.8 CRANIOFACIAL PAIN SYNDROME: ICD-10-CM

## 2024-11-19 DIAGNOSIS — R13.12 DYSPHAGIA, OROPHARYNGEAL PHASE: Primary | ICD-10-CM

## 2024-11-19 DIAGNOSIS — R51.9 CRANIOFACIAL PAIN: ICD-10-CM

## 2024-11-19 DIAGNOSIS — G50.1 ATYPICAL FACIAL PAIN: ICD-10-CM

## 2024-11-19 DIAGNOSIS — R13.19 ESOPHAGEAL DYSPHAGIA: ICD-10-CM

## 2024-11-19 DIAGNOSIS — G44.86 CERVICOGENIC HEADACHE: ICD-10-CM

## 2024-11-19 PROCEDURE — 92610 EVALUATE SWALLOWING FUNCTION: CPT

## 2024-11-19 PROCEDURE — 92526 ORAL FUNCTION THERAPY: CPT

## 2024-11-19 NOTE — PROGRESS NOTES
Speech-Language Pathology Initial Evaluation    Today's date: 2024   Patient’s name: Sumi Lee  : 1945  MRN: [id number]  Safety measures: Medical tape allergy, aspiration/choking risk, medication allergies   Referring provider: Harvinder Ventura MD    Encounter Diagnosis     ICD-10-CM    1. Dysphagia, oropharyngeal phase  R13.12       2. 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 Craniofacial pain  R51.9 Ambulatory Referral to Speech Therapy      4. Cervicogenic headache  G44.86 Ambulatory Referral to Speech Therapy      5. Atypical facial pain  G50.1 Ambulatory Referral to Speech Therapy      6. Esophageal dysphagia  R13.19           Visit tracking:  -Referring provider: Epic  -Billing guidelines: CMS  -Visit #1/10  -Insurance: Medicare ([employer] secondary)  -RE due 2025     Assessment: Patient presents with moderate  oropharyngeal dysphagia difficulties at this time, characterized by reduced toleration of liquids and solids, multiple swallows to clear bolus, requires 1:1 liquid wash, difficulties tolerating small bolus presentation impacting functional PO intake at this time.  Due to limited ability to assess overtly in session, SLP recommending instrumental assessment at this time in order to further guide therapeutic interventions and reduce risk of aspiration/choking and associated complications. Patient verbalized understanding. Plan to place on hold pending instrumental assessment. Patient verbalized understanding. Patient would benefit from skilled SLP interventions at this time in order to facilitate improved toleration of liquids and solids and to train in strategies in order to facilitate improved toleration of liquids and solids reducing risk of aspiration and related complications.         Short Term Goals:    Patient will improve HLE/pharyngeal/OM strength and timing as appropriate with use of HLE/OM exercises and additional appropriate  interventions in order to facilitate improved toleration of liquids and solids demonstrating 90% free of overt s/s of aspiration/penetration, to be achieved in 6-8 weeks.      Patient will demonstrate consistent use of compensatory strategies (e.g., upright positioning, small bites/sips, slow rate, alternation of consistencies, multiple swallows, effortful swallow, chin tuck, head turn, etc.) with 90% accuracy to reduce risk of  penetration/aspiration during consumption of liquids/solids, to be achieved in 6-8 weeks.    Patient will tolerate least restrictive diet/liquids with no overt s/s of aspiration 90% of the time, provided use of compensatory strategies in order to reduce risk of aspiration and related complications, to be achieved in 6-8 weeks.     Long Term Goals:    Pt will tolerate  least restrictive diet/liquids  with no overt s/s of aspiration 90% of the time independently for use of compensatory strategies allowing for optimized hydration/nutrition.     Patient will utilize compensatory strategies 90% of the time independently optimizing safety and efficacy of swallowing function on P.O. intake without overt s/sx of penetration or aspiration 90% of the time by discharge.     Patient will receive a videofluoroscopic swallow study (VFSS) to fully assess physiology and anatomy of the swallow and to determine the appropriate diet/liquid level and appropriate therapeutic interventions by discharge.         Plan  Patient would benefit from outpatient skilled Speech Therapy services: Dysphagia therapy    Frequency: 1x weekly  Duration: 6-8 weeks--- patient to be placed on hold pending instrumental assessment    Intervention certification from: 11/19/2024  Intervention certification to:  1/14/2025      Subjective  History of present illness: Patient is a 78 y.o. female who was referred to outpatient skilled Speech Therapy services for a dysphagia evaluation. Pt with medical history significant for Sjogrens,  "esophageal dysphagia, craniofacial pain syndrome, GERD resulting in oropharyngeal dysphagia at this time. Pt was previously able to tolerate diet/liquids without significantly increased difficulties provided use of strategies however now presents with increased dysphagia symptoms impacting toleration of liquids/solids. Pt requires skilled SLP interventions to reduce risk of further decline in function, aspiration, choking, and additional associated complications.     Patient subjective report:  Patient reports swallowing symptoms \"for ever\", notes history of fibro myalgia and Sjogren symptoms since childhood, reports official dx 2 years ago. Notes difficulties swallowing progressively each year, she reports has had to call 911 in restaurants/at home for choking episodes multiple times.  Reports having to be selective of items to eat, eats small amounts and adds moisture to foods, difficulties with med pass as well. Patient reports she can tolerate coke better than thin liquid water and other liquids.  Patient also reports chronic jaw pain, reports eats a lot of soups, etc.   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  EAT-10 score: 39/40    -Difficulty swallowing: Solids, " Liquids, and Pills    -Current diet (solids): Regular (SOFT SELECTIONS)  -Current diet (liquids): Thin  -Alternative Feeding Method?: No    -Facial appearance Symmetrical   -Dentition Adequate   -Labial function Decreased ROM (bilateral), Decreased strength (bilateral), and Decreased coordination   -Lingual function Decreased ROM (bilateral), Decreased ROM (elevation), Decreased strength (bilateral), and Decreased strength (protrusion)   -Velar function DNT d/t limited jaw opening       LIQUID CONSISTENCY TESTIN. Liquid Consistency (Thin) - water ---patient independently presented self with trace bolus size via cup sip  Administered by: Cup and Self-fed  Strategies, attempts, and responses: None    CLINICAL FINDINGS:  Oral phase impairments: WFL  Pharyngeal Phase Impairments: Complaints of globus sensation and Other (multiple swallows to clear)    *Laryngeal excursion upon palpation: variable HLE noted      SOLID CONSISTENCY TESTIN. Solid Consistency (Puree) - applesauce  Administered by: Spoon and Self-fed-- patient presented self with <1/4 tsp of bolus  Strategies, attempts, and responses: Other: effortful swallow, multiple swallows, required x2-3 liquid wash to clear as per patient reports     CLINICAL FINDINGS:  Oral phase impairments: Anterior-posterior transit and Bolus formation/control  Pharyngeal Phase Impairments: Complaints of globus sensation and Other (multiple swallows to clear)    *Laryngeal excursion upon palpation: reduced HLE noted      SWALLOWING DIAGNOSTIC IMPRESSION:  -Swallowing diagnosis/severity: moderate oropharyngeal phase dysphagia    -Factors affecting performance: None    -Safety concerns: Risk for aspiration, Risk for choking, and Risk for inadequate nutrition/ hydration    -Risk factors: Complex medical history, GERD, and Xerostomia    SAFETY PRECAUTIONS:  -Supervision: Independent     -Strategies: Small sips and bites when eating, Slow rate, swallow between bites, and  Alternate liquids and solids    -Positioning: Upright position during meals    -Compensatory strategies: Effortful swallow, Multiple swallows, and Other:addition of moisture as needed    -Recommend (solids): Regular (softer selections-- as unable to assess this date, recommendations pending results of further instrumental swallow assessment)    -Recommend (liquids): Thin    -Recommend (medications): as tolerated    REFERRALS: Other : recommendations for instrumental assessment at this time. SLP provided education on VFSS vs FEES as patient does not wish to pursue barium assessments at this time. Patient potentially interested in FEES assessment. SLP will reach out to MD. Patient in agreement.       Strength and Endurance Testing:  The IOPI Pro is used by medical professionals to measure, evaluate, and increase the strength and endurance of the tongue and lip in patients with oral motor disorders, including dysphagia and dysarthria.  The IOPI measures the maximum pressure (Pmax) a patient can produce in an air-filled bulb when it is compressed as hard as possible by the tongue or lip against a hard surface (e.g. The palate or teeth, respectively). Pmax is a measure of strength, expressed in kilopascals (kPa, an international unit of pressure).       For patients with dysphagia or dysarthria, oral motor fatigability may be of interest.  The IOPI Pro can be used to assess tongue fatigability.  Low endurance values are an indicator of a high fatigability.  Endurance is measured with the IOPI Pro by quantifying the length of time that a patient can maintain 50% of his or her Pmax.  This procedure is conducted in Target Mode by setting the target value to 50% of the patient's Pmax and timing how long the patient can hold the top (green) light on.       The medical professional determines what target value is appropriate for exercise therapy purposes and provides specific instructions to the patient for a particular  exercise protocol.  In Target Mode, the pressure required to illuminate the green light a the top of the light array can be adjusted using the Set Target arrow buttons.  This green light is used as a visual target for the patient.     Tongue tip Peak Max after 3 trials: 17 Norm for gender is 63   Tongue back Peak Max after 3 trials: DNT d/t pain limitations Norm for gender is 5-10% lower than anterior tongue   Right lip Peak Max after 3 trials: DNT d/t pain limitations Norm for gender is 35   Left lip Peak Max after 3 trials: DNT d/t pain limitations Norm for gender is 35     *Patient reports 6/10 pain this date, reports IOPI tasks increase/trigger pain response. SLP discontinued task at this time. Patient noted to have reduced lingual ROM/strength/reduced oral opening (suspect likely due to pain response as well as function).     Treatment  Patient trained in safe swallow compensatory strategy recommendations (small bites/sips, slow rate, alternate liquids/solids, upright for PO consumption, 1 bolus presentation at a time, and addition of moisture to foods, softer food selections ). Patient verbalized understanding and reports does implement within home environment.

## 2024-11-19 NOTE — LETTER
2024    Harvinder Ventura MD  [address]  [address]  [place] [id number]    Patient: Sumi Lee   YOB: 1945   Date of Visit: 2024     Encounter Diagnosis     ICD-10-CM    1. Dysphagia, oropharyngeal phase  R13.12       2. 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 Craniofacial pain  R51.9 Ambulatory Referral to Speech Therapy      4. Cervicogenic headache  G44.86 Ambulatory Referral to Speech Therapy      5. Atypical facial pain  G50.1 Ambulatory Referral to Speech Therapy      6. Esophageal dysphagia  R13.19           Dear Dr. Ventura:    Thank you for your recent referral of Sumi Lee. Please review the attached evaluation summary from Sumi's recent visit.     Please verify that you agree with the plan of care by signing the attached order.     If you have any questions or concerns, please do not hesitate to call.     I sincerely appreciate the opportunity to share in the care of one of your patients and hope to have another opportunity to work with you in the near future.     Sincerely,    Lashawn Mercedes CCC-SLP      Referring Provider:     Based upon review of the patient's progress and continued therapy plan, it is my medical opinion that Sumi Lee should continue speech therapy treatment at the [facility] at [facility]:                    Harvinder Ventura MD  [address] [facility]  [address]  [place] [id number]  Via Fax: [phone]        Speech-Language Pathology Initial Evaluation    Today's date: 2024   Patient’s name: Sumi Lee  : 1945  MRN: [id number]  Safety measures: Medical tape allergy, aspiration/choking risk, medication allergies   Referring provider: Harvinder Ventura MD    Encounter Diagnosis     ICD-10-CM    1. Dysphagia, oropharyngeal phase  R13.12       2. 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 Craniofacial pain  R51.9  Ambulatory Referral to Speech Therapy      4. Cervicogenic headache  G44.86 Ambulatory Referral to Speech Therapy      5. Atypical facial pain  G50.1 Ambulatory Referral to Speech Therapy      6. Esophageal dysphagia  R13.19           Visit tracking:  -Referring provider: Epic  -Billing guidelines: CMS  -Visit #1/10  -Insurance: Medicare ([employer] secondary)  -RE due 1/14/2025     Assessment: Patient presents with moderate  oropharyngeal dysphagia difficulties at this time, characterized by reduced toleration of liquids and solids, multiple swallows to clear bolus, requires 1:1 liquid wash, difficulties tolerating small bolus presentation impacting functional PO intake at this time.  Due to limited ability to assess overtly in session, SLP recommending instrumental assessment at this time in order to further guide therapeutic interventions and reduce risk of aspiration/choking and associated complications. Patient verbalized understanding. Plan to place on hold pending instrumental assessment. Patient verbalized understanding. Patient would benefit from skilled SLP interventions at this time in order to facilitate improved toleration of liquids and solids and to train in strategies in order to facilitate improved toleration of liquids and solids reducing risk of aspiration and related complications.         Short Term Goals:    Patient will improve HLE/pharyngeal/OM strength and timing as appropriate with use of HLE/OM exercises and additional appropriate interventions in order to facilitate improved toleration of liquids and solids demonstrating 90% free of overt s/s of aspiration/penetration, to be achieved in 6-8 weeks.      Patient will demonstrate consistent use of compensatory strategies (e.g., upright positioning, small bites/sips, slow rate, alternation of consistencies, multiple swallows, effortful swallow, chin tuck, head turn, etc.) with 90% accuracy to reduce risk of  penetration/aspiration during  consumption of liquids/solids, to be achieved in 6-8 weeks.    Patient will tolerate least restrictive diet/liquids with no overt s/s of aspiration 90% of the time, provided use of compensatory strategies in order to reduce risk of aspiration and related complications, to be achieved in 6-8 weeks.     Long Term Goals:    Pt will tolerate  least restrictive diet/liquids  with no overt s/s of aspiration 90% of the time independently for use of compensatory strategies allowing for optimized hydration/nutrition.     Patient will utilize compensatory strategies 90% of the time independently optimizing safety and efficacy of swallowing function on P.O. intake without overt s/sx of penetration or aspiration 90% of the time by discharge.     Patient will receive a videofluoroscopic swallow study (VFSS) to fully assess physiology and anatomy of the swallow and to determine the appropriate diet/liquid level and appropriate therapeutic interventions by discharge.         Plan  Patient would benefit from outpatient skilled Speech Therapy services: Dysphagia therapy    Frequency: 1x weekly  Duration: 6-8 weeks--- patient to be placed on hold pending instrumental assessment    Intervention certification from: 11/19/2024  Intervention certification to:  1/14/2025      Subjective  History of present illness: Patient is a 78 y.o. female who was referred to outpatient skilled Speech Therapy services for a dysphagia evaluation. Pt with medical history significant for Sjogrens, esophageal dysphagia, craniofacial pain syndrome, GERD resulting in oropharyngeal dysphagia at this time. Pt was previously able to tolerate diet/liquids without significantly increased difficulties provided use of strategies however now presents with increased dysphagia symptoms impacting toleration of liquids/solids. Pt requires skilled SLP interventions to reduce risk of further decline in function, aspiration, choking, and additional associated  "complications.     Patient subjective report:  Patient reports swallowing symptoms \"for ever\", notes history of fibro myalgia and Sjogren symptoms since childhood, reports official dx 2 years ago. Notes difficulties swallowing progressively each year, she reports has had to call 911 in restaurants/at home for choking episodes multiple times.  Reports having to be selective of items to eat, eats small amounts and adds moisture to foods, difficulties with med pass as well. Patient reports she can tolerate coke better than thin liquid water and other liquids.  Patient also reports chronic jaw pain, reports eats a lot of soups, etc.   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  EAT-10 score: 39/40    -Difficulty swallowing: Solids, Liquids, and Pills    -Current diet (solids): Regular (SOFT SELECTIONS)  -Current diet (liquids): Thin  -Alternative Feeding Method?: No    -Facial appearance Symmetrical   -Dentition Adequate   -Labial function Decreased ROM (bilateral), Decreased strength (bilateral), and Decreased coordination   -Lingual function Decreased ROM (bilateral), Decreased ROM (elevation), Decreased strength (bilateral), and Decreased strength (protrusion)   -Velar function " DNT d/t limited jaw opening       LIQUID CONSISTENCY TESTIN. Liquid Consistency (Thin) - water ---patient independently presented self with trace bolus size via cup sip  Administered by: Cup and Self-fed  Strategies, attempts, and responses: None    CLINICAL FINDINGS:  Oral phase impairments: WFL  Pharyngeal Phase Impairments: Complaints of globus sensation and Other (multiple swallows to clear)    *Laryngeal excursion upon palpation: variable HLE noted      SOLID CONSISTENCY TESTIN. Solid Consistency (Puree) - applesauce  Administered by: Spoon and Self-fed-- patient presented self with <1/4 tsp of bolus  Strategies, attempts, and responses: Other: effortful swallow, multiple swallows, required x2-3 liquid wash to clear as per patient reports     CLINICAL FINDINGS:  Oral phase impairments: Anterior-posterior transit and Bolus formation/control  Pharyngeal Phase Impairments: Complaints of globus sensation and Other (multiple swallows to clear)    *Laryngeal excursion upon palpation: reduced HLE noted      SWALLOWING DIAGNOSTIC IMPRESSION:  -Swallowing diagnosis/severity: moderate oropharyngeal phase dysphagia    -Factors affecting performance: None    -Safety concerns: Risk for aspiration, Risk for choking, and Risk for inadequate nutrition/ hydration    -Risk factors: Complex medical history, GERD, and Xerostomia    SAFETY PRECAUTIONS:  -Supervision: Independent     -Strategies: Small sips and bites when eating, Slow rate, swallow between bites, and Alternate liquids and solids    -Positioning: Upright position during meals    -Compensatory strategies: Effortful swallow, Multiple swallows, and Other:addition of moisture as needed    -Recommend (solids): Regular (softer selections-- as unable to assess this date, recommendations pending results of further instrumental swallow assessment)    -Recommend (liquids): Thin    -Recommend (medications): as tolerated    REFERRALS: Other : recommendations for  instrumental assessment at this time. SLP provided education on VFSS vs FEES as patient does not wish to pursue barium assessments at this time. Patient potentially interested in FEES assessment. SLP will reach out to MD. Patient in agreement.       Strength and Endurance Testing:  The IOPI Pro is used by medical professionals to measure, evaluate, and increase the strength and endurance of the tongue and lip in patients with oral motor disorders, including dysphagia and dysarthria.  The IOPI measures the maximum pressure (Pmax) a patient can produce in an air-filled bulb when it is compressed as hard as possible by the tongue or lip against a hard surface (e.g. The palate or teeth, respectively). Pmax is a measure of strength, expressed in kilopascals (kPa, an international unit of pressure).       For patients with dysphagia or dysarthria, oral motor fatigability may be of interest.  The IOPI Pro can be used to assess tongue fatigability.  Low endurance values are an indicator of a high fatigability.  Endurance is measured with the IOPI Pro by quantifying the length of time that a patient can maintain 50% of his or her Pmax.  This procedure is conducted in Target Mode by setting the target value to 50% of the patient's Pmax and timing how long the patient can hold the top (green) light on.       The medical professional determines what target value is appropriate for exercise therapy purposes and provides specific instructions to the patient for a particular exercise protocol.  In Target Mode, the pressure required to illuminate the green light a the top of the light array can be adjusted using the Set Target arrow buttons.  This green light is used as a visual target for the patient.     Tongue tip Peak Max after 3 trials: 17 Norm for gender is 63   Tongue back Peak Max after 3 trials: DNT d/t pain limitations Norm for gender is 5-10% lower than anterior tongue   Right lip Peak Max after 3 trials: DNT d/t pain  limitations Norm for gender is 35   Left lip Peak Max after 3 trials: DNT d/t pain limitations Norm for gender is 35     *Patient reports 6/10 pain this date, reports IOPI tasks increase/trigger pain response. SLP discontinued task at this time. Patient noted to have reduced lingual ROM/strength/reduced oral opening (suspect likely due to pain response as well as function).     Treatment  Patient trained in safe swallow compensatory strategy recommendations (small bites/sips, slow rate, alternate liquids/solids, upright for PO consumption, 1 bolus presentation at a time, and addition of moisture to foods, softer food selections ). Patient verbalized understanding and reports does implement within home environment.

## 2024-11-29 DIAGNOSIS — R11.0 NAUSEA: ICD-10-CM

## 2024-11-29 RX ORDER — ONDANSETRON 4 MG/1
4 TABLET, FILM COATED ORAL EVERY 8 HOURS PRN
Qty: 30 TABLET | Refills: 1 | Status: SHIPPED | OUTPATIENT
Start: 2024-11-29

## 2024-12-02 ENCOUNTER — HOSPITAL ENCOUNTER (EMERGENCY)
Facility: HOSPITAL | Age: 79
Discharge: HOME | End: 2024-12-02
Attending: EMERGENCY MEDICINE
Payer: MEDICARE

## 2024-12-02 ENCOUNTER — APPOINTMENT (EMERGENCY)
Dept: CT IMAGING | Facility: HOSPITAL | Age: 79
End: 2024-12-02
Payer: MEDICARE

## 2024-12-02 VITALS
HEART RATE: 96 BPM | RESPIRATION RATE: 18 BRPM | TEMPERATURE: 99 F | DIASTOLIC BLOOD PRESSURE: 60 MMHG | SYSTOLIC BLOOD PRESSURE: 125 MMHG | OXYGEN SATURATION: 93 %

## 2024-12-02 DIAGNOSIS — R11.2 NAUSEA AND VOMITING: ICD-10-CM

## 2024-12-02 DIAGNOSIS — R10.9 ABDOMINAL PAIN: ICD-10-CM

## 2024-12-02 DIAGNOSIS — S22.050A COMPRESSION FRACTURE OF T5 VERTEBRA, INITIAL ENCOUNTER (HCC): ICD-10-CM

## 2024-12-02 DIAGNOSIS — S32.040A COMPRESSION FRACTURE OF L4 VERTEBRA, INITIAL ENCOUNTER (HCC): ICD-10-CM

## 2024-12-02 DIAGNOSIS — R93.89 ABNORMAL CT SCAN: ICD-10-CM

## 2024-12-02 DIAGNOSIS — K52.9 ENTEROCOLITIS: Primary | ICD-10-CM

## 2024-12-02 LAB
ALBUMIN SERPL BCG-MCNC: 3 G/DL (ref 3.5–5)
ALP SERPL-CCNC: 18 U/L (ref 34–104)
ALT SERPL W P-5'-P-CCNC: 9 U/L (ref 7–52)
ANION GAP SERPL CALCULATED.3IONS-SCNC: 8 MMOL/L (ref 4–13)
AST SERPL W P-5'-P-CCNC: 22 U/L (ref 13–39)
ATRIAL RATE: 102 BPM
BASOPHILS # BLD MANUAL: 0 THOUSAND/UL (ref 0–0.1)
BASOPHILS NFR MAR MANUAL: 0 % (ref 0–1)
BILIRUB SERPL-MCNC: 0.43 MG/DL (ref 0.2–1)
BUN SERPL-MCNC: 14 MG/DL (ref 5–25)
CALCIUM ALBUM COR SERPL-MCNC: 7.8 MG/DL (ref 8.3–10.1)
CALCIUM SERPL-MCNC: 7 MG/DL (ref 8.4–10.2)
CARDIAC TROPONIN I PNL SERPL HS: 5 NG/L (ref ?–50)
CHLORIDE SERPL-SCNC: 105 MMOL/L (ref 96–108)
CO2 SERPL-SCNC: 24 MMOL/L (ref 21–32)
CREAT SERPL-MCNC: 0.49 MG/DL (ref 0.6–1.3)
EOSINOPHIL # BLD MANUAL: 0 THOUSAND/UL (ref 0–0.4)
EOSINOPHIL NFR BLD MANUAL: 0 % (ref 0–6)
ERYTHROCYTE [DISTWIDTH] IN BLOOD BY AUTOMATED COUNT: 12.3 % (ref 11.6–15.1)
GFR SERPL CREATININE-BSD FRML MDRD: 94 ML/MIN/1.73SQ M
GLUCOSE SERPL-MCNC: 85 MG/DL (ref 65–140)
HCT VFR BLD AUTO: 39 % (ref 34.8–46.1)
HGB BLD-MCNC: 12.6 G/DL (ref 11.5–15.4)
LG PLATELETS BLD QL SMEAR: PRESENT
LIPASE SERPL-CCNC: 8 U/L (ref 11–82)
LYMPHOCYTES # BLD AUTO: 0.09 THOUSAND/UL (ref 0.6–4.47)
LYMPHOCYTES # BLD AUTO: 1 % (ref 14–44)
MCH RBC QN AUTO: 30 PG (ref 26.8–34.3)
MCHC RBC AUTO-ENTMCNC: 32.3 G/DL (ref 31.4–37.4)
MCV RBC AUTO: 93 FL (ref 82–98)
MONOCYTES # BLD AUTO: 0.46 THOUSAND/UL (ref 0–1.22)
MONOCYTES NFR BLD: 5 % (ref 4–12)
NEUTROPHILS # BLD MANUAL: 8.64 THOUSAND/UL (ref 1.85–7.62)
NEUTS BAND NFR BLD MANUAL: 1 % (ref 0–8)
NEUTS SEG NFR BLD AUTO: 93 % (ref 43–75)
P AXIS: 82 DEGREES
PLATELET # BLD AUTO: 286 THOUSANDS/UL (ref 149–390)
PLATELET BLD QL SMEAR: ADEQUATE
PMV BLD AUTO: 9.4 FL (ref 8.9–12.7)
POTASSIUM SERPL-SCNC: 3.8 MMOL/L (ref 3.5–5.3)
PR INTERVAL: 188 MS
PROT SERPL-MCNC: 5 G/DL (ref 6.4–8.4)
QRS AXIS: -59 DEGREES
QRSD INTERVAL: 112 MS
QT INTERVAL: 372 MS
QTC INTERVAL: 484 MS
RBC # BLD AUTO: 4.2 MILLION/UL (ref 3.81–5.12)
RBC MORPH BLD: NORMAL
SODIUM SERPL-SCNC: 137 MMOL/L (ref 135–147)
T WAVE AXIS: 83 DEGREES
VENTRICULAR RATE: 102 BPM
WBC # BLD AUTO: 9.19 THOUSAND/UL (ref 4.31–10.16)

## 2024-12-02 PROCEDURE — 84484 ASSAY OF TROPONIN QUANT: CPT

## 2024-12-02 PROCEDURE — 93010 ELECTROCARDIOGRAM REPORT: CPT | Performed by: STUDENT IN AN ORGANIZED HEALTH CARE EDUCATION/TRAINING PROGRAM

## 2024-12-02 PROCEDURE — 85027 COMPLETE CBC AUTOMATED: CPT

## 2024-12-02 PROCEDURE — 71260 CT THORAX DX C+: CPT

## 2024-12-02 PROCEDURE — 83690 ASSAY OF LIPASE: CPT

## 2024-12-02 PROCEDURE — 93005 ELECTROCARDIOGRAM TRACING: CPT

## 2024-12-02 PROCEDURE — 36415 COLL VENOUS BLD VENIPUNCTURE: CPT

## 2024-12-02 PROCEDURE — 96375 TX/PRO/DX INJ NEW DRUG ADDON: CPT

## 2024-12-02 PROCEDURE — 74177 CT ABD & PELVIS W/CONTRAST: CPT

## 2024-12-02 PROCEDURE — 99285 EMERGENCY DEPT VISIT HI MDM: CPT | Performed by: EMERGENCY MEDICINE

## 2024-12-02 PROCEDURE — 80053 COMPREHEN METABOLIC PANEL: CPT

## 2024-12-02 PROCEDURE — 99285 EMERGENCY DEPT VISIT HI MDM: CPT

## 2024-12-02 PROCEDURE — 96365 THER/PROPH/DIAG IV INF INIT: CPT

## 2024-12-02 PROCEDURE — 85007 BL SMEAR W/DIFF WBC COUNT: CPT

## 2024-12-02 RX ORDER — ONDANSETRON 4 MG/1
4 TABLET, FILM COATED ORAL EVERY 6 HOURS
Qty: 12 TABLET | Refills: 0 | Status: SHIPPED | OUTPATIENT
Start: 2024-12-02

## 2024-12-02 RX ORDER — ONDANSETRON 2 MG/ML
4 INJECTION INTRAMUSCULAR; INTRAVENOUS ONCE
Status: DISCONTINUED | OUTPATIENT
Start: 2024-12-02 | End: 2024-12-02

## 2024-12-02 RX ORDER — ONDANSETRON 4 MG/1
4 TABLET, ORALLY DISINTEGRATING ORAL ONCE
Status: DISCONTINUED | OUTPATIENT
Start: 2024-12-02 | End: 2024-12-02 | Stop reason: HOSPADM

## 2024-12-02 RX ORDER — ONDANSETRON 2 MG/ML
4 INJECTION INTRAMUSCULAR; INTRAVENOUS ONCE
Status: COMPLETED | OUTPATIENT
Start: 2024-12-02 | End: 2024-12-02

## 2024-12-02 RX ORDER — ONDANSETRON 4 MG/1
4 TABLET, ORALLY DISINTEGRATING ORAL EVERY 6 HOURS PRN
Qty: 12 TABLET | Refills: 0 | Status: SHIPPED | OUTPATIENT
Start: 2024-12-02 | End: 2024-12-02

## 2024-12-02 RX ORDER — SODIUM CHLORIDE, SODIUM GLUCONATE, SODIUM ACETATE, POTASSIUM CHLORIDE, MAGNESIUM CHLORIDE, SODIUM PHOSPHATE, DIBASIC, AND POTASSIUM PHOSPHATE .53; .5; .37; .037; .03; .012; .00082 G/100ML; G/100ML; G/100ML; G/100ML; G/100ML; G/100ML; G/100ML
1000 INJECTION, SOLUTION INTRAVENOUS ONCE
Status: COMPLETED | OUTPATIENT
Start: 2024-12-02 | End: 2024-12-02

## 2024-12-02 RX ORDER — KETOROLAC TROMETHAMINE 30 MG/ML
15 INJECTION, SOLUTION INTRAMUSCULAR; INTRAVENOUS ONCE
Status: COMPLETED | OUTPATIENT
Start: 2024-12-02 | End: 2024-12-02

## 2024-12-02 RX ADMIN — ONDANSETRON 4 MG: 2 INJECTION INTRAMUSCULAR; INTRAVENOUS at 06:29

## 2024-12-02 RX ADMIN — KETOROLAC TROMETHAMINE 15 MG: 30 INJECTION, SOLUTION INTRAMUSCULAR; INTRAVENOUS at 06:27

## 2024-12-02 RX ADMIN — SODIUM CHLORIDE, SODIUM GLUCONATE, SODIUM ACETATE, POTASSIUM CHLORIDE, MAGNESIUM CHLORIDE, SODIUM PHOSPHATE, DIBASIC, AND POTASSIUM PHOSPHATE 1000 ML: .53; .5; .37; .037; .03; .012; .00082 INJECTION, SOLUTION INTRAVENOUS at 06:32

## 2024-12-02 RX ADMIN — IOHEXOL 85 ML: 350 INJECTION, SOLUTION INTRAVENOUS at 08:32

## 2024-12-02 NOTE — INCIDENTAL FINDINGS
The following findings require follow up:  Radiographic finding   Finding:   IMPRESSION:  -6 mm hypoattenuating lesion within the pancreatic tail. Correlate with MRI of the abdomen with and without contrast for further evaluation.     -Nonspecific 7 mm sclerotic lesion within the posterior T4 vertebral body. If there is history of malignancy correlate with bone scan or MRI of the thoracic spine with and without contrast.     -Increasing size of cystic lesion within the upper mediastinum measuring 1.8 cm compared to CTA from 7/29/2019. Recommend follow-up CT chest in 6 months to ensure stability.     -Multiple pulmonary nodules measuring up to 3 mm (including guidelines as above), cholelithiasis, mild right hydronephrosis. Additional chronic findings as above.   Follow up required: MRI and CT   Follow up should be done ASAP and 6 month(s)    Please notify the following clinician to assist with the follow up:   Dr. Ricketts    Incidental finding results were discussed with the Patient by Nawaf Fagan MD on 12/02/24.   They expressed understanding and all questions answered.

## 2024-12-02 NOTE — DISCHARGE INSTRUCTIONS
Please follow-up with primary care provider.  Please return to the ED with new or worsening symptoms-see attached.  Please take medication as prescribed for symptoms.  Please follow-up with your primary care provider regarding the incidental findings below.    Incidental findings on CT scan:  -Groundglass and tree-in-bud opacities within the posterior right upper and left lower lobes which may be due to aspiration, infectious/inflammation.  -6 mm hypoattenuating lesion within the pancreatic tail. Correlate with MRI of the abdomen with and without contrast for further evaluation.  -Nonspecific 7 mm sclerotic lesion within the posterior T4 vertebral body. If there is history of malignancy correlate with bone scan or MRI of the thoracic spine with and without contrast.  -Increasing size of cystic lesion within the upper mediastinum measuring 1.8 cm compared to CTA from 7/29/2019. Recommend follow-up CT chest in 6 months to ensure stability.  -Multiple pulmonary nodules measuring up to 3 mm (including guidelines as above), cholelithiasis, mild right hydronephrosis. Additional chronic findings as above.

## 2024-12-02 NOTE — ED ATTENDING ATTESTATION
12/2/2024  I, Nathanael Agee MD, saw and evaluated the patient. I have discussed the patient with the resident/non-physician practitioner and agree with the resident's/non-physician practitioner's findings, Plan of Care, and MDM as documented in the resident's/non-physician practitioner's note, except where noted. All available labs and Radiology studies were reviewed.  I was present for key portions of any procedure(s) performed by the resident/non-physician practitioner and I was immediately available to provide assistance.       At this point I agree with the current assessment done in the Emergency Department.  I have conducted an independent evaluation of this patient a history and physical is as follows:    78-year-old female presents for evaluation of several days of upper abdominal discomfort  With nausea, nonbloody nonbilious vomitus with diarrhea starting today.   sick with similar symptoms.  On exam no acute distress, lungs normal, cardiac normal, abdomen normal.  Medical Decision Making;-abdominal pain radiating to the chest-will do abdominal labs showed hepatitis, pancreatitis, acute kidney injury, electrolyte abnormality, EKG/troponin rule out atypical presentation of acute coronary syndrome, CT chest abdomen pelvis to rule out dissection, small bowel obstruction,.  Pain medications, reassess  ED Course         Critical Care Time  Procedures

## 2024-12-02 NOTE — ED PROVIDER NOTES
Time reflects when diagnosis was documented in both MDM as applicable and the Disposition within this note       Time User Action Codes Description Comment    12/2/2024 10:35 AM MerlornatShayneon Add [R10.9] Abdominal pain     12/2/2024 10:35 AM MerkertShayneon Add [K52.9] Enterocolitis     12/2/2024 10:36 AM MerkertShayneon Add [R11.2] Nausea and vomiting     12/2/2024 10:36 AM MerkertShayneon Modify [R10.9] Abdominal pain     12/2/2024 10:36 AM MerkertShayneon Modify [K52.9] Enterocolitis     12/2/2024 10:36 AM MerkertShayneon Add [S22.000A] Compression fracture of body of thoracic vertebra (HCC)     12/2/2024 10:36 AM MerkertShayneon Add [S32.040A] Compression fracture of L4 vertebra, initial encounter (Formerly [facility])     12/2/2024 10:37 AM MerlornatShayneon Remove [S22.000A] Compression fracture of body of thoracic vertebra (HCC)     12/2/2024 10:37 AM Merkert Nawaf Add [S22.050A] Compression fracture of T5 vertebra, initial encounter (Formerly [facility])     12/2/2024 10:37 AM Shayne Faganon Add [R93.89] Abnormal CT scan           ED Disposition       ED Disposition   Discharge    Condition   Stable    Date/Time   Mon Dec 2, 2024 10:35 AM    Comment   Sumi Lee discharge to home/self care.                   Assessment & Plan       Medical Decision Making  Patient is a 78-year-old female presenting with abdominal pain and vomiting.    Differential includes but not limited to viral gastroenteritis, hiatal hernia complication, SBO, pancreatitis, ACS.  Cardiac and abdominal labs as well as CT chest abdomen pelvis ordered.  Patient given symptomatic treatment with significant relief.  Labs and CT scan without acute findings.  There were many incidental findings that need to be followed up on with PCP, so patient was informed.    Patient cleared for discharge with PCP follow-up and return precautions.    Amount and/or Complexity of Data Reviewed  Labs: ordered.  Radiology: ordered.    Risk  Prescription drug management.              Medications   ondansetron (ZOFRAN) injection 4 mg (4 mg Intravenous Given 12/2/24 0629)   ketorolac (TORADOL) injection 15 mg (15 mg Intravenous Given 12/2/24 0627)   multi-electrolyte (ISOLYTE-S PH 7.4) bolus 1,000 mL (0 mL Intravenous Stopped 12/2/24 0721)   iohexol (OMNIPAQUE) 350 MG/ML injection (MULTI-DOSE) 85 mL (85 mL Intravenous Given 12/2/24 0832)       ED Risk Strat Scores                           SBIRT 22yo+      Flowsheet Row Most Recent Value   Initial Alcohol Screen: US AUDIT-C     1. How often do you have a drink containing alcohol? 0 Filed at: 12/02/2024 0650   2. How many drinks containing alcohol do you have on a typical day you are drinking?  0 Filed at: 12/02/2024 0650   3a. Male UNDER 65: How often do you have five or more drinks on one occasion? 0 Filed at: 12/02/2024 0650   3b. FEMALE Any Age, or MALE 65+: How often do you have 4 or more drinks on one occassion? 0 Filed at: 12/02/2024 0650   Audit-C Score 0 Filed at: 12/02/2024 0650   VEENA: How many times in the past year have you...    Used an illegal drug or used a prescription medication for non-medical reasons? Never Filed at: 12/02/2024 0650                            History of Present Illness       Chief Complaint   Patient presents with    Abdominal Pain     Pt  c/o n/v for the last 12 hours. Pt reports 10/10 abdominal pain . Pt with hx of hiatal hernia       Past Medical History:   Diagnosis Date    Allergy to adhesive tape     tears skin    Arthritis     Back pain     cervical and lumbar    Chronic cough     from postnasal drip    Chronic pain disorder     Claustrophobia     Coronary artery disease     Dental disease Forever    Dental implants    Dependence on nocturnal oxygen therapy     2LNC    Disease of thyroid gland 1998    Disorder of upper airway     Wears O2 2 LPM at night- hypoxic at night    Dizziness 10 years    Currently increasing. Osteoarthritis cervical spine    Dysphagia     occ    Fatigue     Fibromyalgia     Fecal smearing     Fibromyalgia, primary     Frequent urination     Headache     Headache(784.0) 50 years    Hearing loss in left ear     has hearing aid    HL (hearing loss) 2019    Currently lost.    Hyperlipidemia     Hypothyroid     Interstitial cystitis     has interStim    Irritable bowel syndrome     Kidney stone     Macular degeneration     Microscopic hematuria     Muscle spasm     Myofascial pain syndrome     Nocturnal hypoxia     Osteoarthritis     Osteoporosis     Other allergy, initial encounter     allergy to blue dye    Pelvic floor dysfunction     Pelvic pain     PONV (postoperative nausea and vomiting)     and pruritis    Rosacea     Seasonal allergies     Shingles     Sinusitis Chronic rhinitis    Sjogren's disease (HCC)     Skin cancer     left shoulder BCC in past    Sleep difficulties     Spondylisthesis     Tension headache     Tinnitus 20years    Getting louder, now also pulsatille tinnitis    TMJ dysfunction 50 years    Urinary urgency     Wears glasses       Past Surgical History:   Procedure Laterality Date    ADENOIDECTOMY      APPENDECTOMY      BONE BIOPSY Right 7/21/2023    Procedure: 2ND TOE MEDIAL MARGIN  EXOSTECTOMY;  Surgeon: Bert Beltrán DPM;  Location: [facility] OR;  Service: Podiatry    BREAST EXCISIONAL BIOPSY Left 1990    bengin    CATARACT EXTRACTION Bilateral     COLONOSCOPY      ESOPHAGOGASTRODUODENOSCOPY N/A 05/25/2018    Procedure: ESOPHAGOGASTRODUODENOSCOPY (EGD);  Surgeon: Jolene Pierce MD;  Location: [facility];  Service: Gastroenterology    FL INJECTION LEFT SHOULDER (ARTHROGRAM)  01/05/2022    HYSTERECTOMY  1971    IR CEREBRAL ANGIOGRAPHY  10/18/2019    OTHER SURGICAL HISTORY Left     limb lengthening    OTHER SURGICAL HISTORY Bilateral 2012, 2014, 2017    interStim    OTHER SURGICAL HISTORY      tendon contracture right hip    IA CYSTOURETHROSCOPY N/A 10/04/2017    Procedure: CYSTOSCOPY;  Surgeon: Gallito Forbes MD;  Location: [facility] OR;  Service: Urology     KS INS/RPLC PERPH SAC/GSTRC NPG/RCVR PCKT CRTJ&CONN N/A 3/8/2023    Procedure: InterStim battery & lead explantation &insertion of new generator & lead MRI compatible;  Surgeon: Gallito Forbes MD;  Location: [facility];  Service: Urology    KS NEUROPLASTY NERVE HAND/FOOT Bilateral 7/21/2023    Procedure: EXTERNAL NEUROLYSIS DEEP PERONEAL NERVE;  Surgeon: Bert Beltrán DPM;  Location: [facility];  Service: Podiatry    SALPINGOOPHORECTOMY  2002    SEPTOPLASTY  1980    due to fracture  with repair    TONSILLECTOMY        Family History   Problem Relation Age of Onset    Endometrial cancer Mother 35    Cancer Father         Oral cancer    No Known Problems Sister     No Known Problems Maternal Grandmother     No Known Problems Maternal Grandfather     No Known Problems Paternal Grandmother     No Known Problems Paternal Grandfather     Cancer Brother     No Known Problems Paternal Aunt     Breast cancer Neg Hx       Social History     Tobacco Use    Smoking status: Never    Smokeless tobacco: Never   Vaping Use    Vaping status: Never Used   Substance Use Topics    Alcohol use: Not Currently     Comment: few x year    Drug use: Never      E-Cigarette/Vaping    E-Cigarette Use Never User       E-Cigarette/Vaping Substances    Nicotine No     THC No     CBD No     Flavoring No     Other No     Unknown No       I have reviewed and agree with the history as documented.     Patient is a 78-year-old female with past medical history of hiatal hernia, Sjogren's disease, hypothyroidism, hyperlipidemia, chronic back pain, GERD presenting for epigastric abdominal pain.  Patient states that she has been having worsening nausea and epigastric discomfort for the last 2 weeks that progressed to vomiting and severe pain yesterday.  Patient was trying to avoid coming to the hospital in the dark but was unable to wait so she called the ambulance.  Patient states that the pain was a 10 out of 10 and she has vomited countless times,  unable to keep anything down.  She says that the pain radiates up into the chest and feels like pressure but does not have this pain at this time.  Patient says that she has had a Sjogren's flare as well, so her whole body hurts.  Patient did have some associated diarrhea and says that her  had diarrhea a few days ago. Patient denies shortness of breath, urinary symptoms, numbness, tingling, or weakness.        Review of Systems        Objective       ED Triage Vitals   Temperature Pulse Blood Pressure Respirations SpO2 Patient Position - Orthostatic VS   12/02/24 0600 12/02/24 0558 12/02/24 0558 12/02/24 0558 12/02/24 0558 12/02/24 0558   99 °F (37.2 °C) (!) 119 148/71 18 98 % Sitting      Temp Source Heart Rate Source BP Location FiO2 (%) Pain Score    12/02/24 0600 12/02/24 0615 12/02/24 0558 -- 12/02/24 0627    Oral Monitor Right arm  10 - Worst Possible Pain      Vitals      Date and Time Temp Pulse SpO2 Resp BP Pain Score FACES Pain Rating User   12/02/24 1000 -- 96 93 % 18 125/60 -- -- TM   12/02/24 0802 -- 97 92 % 17 112/55 -- --    12/02/24 0645 -- 94 97 % 20 132/66 -- -- EP   12/02/24 0630 -- 93 97 % 20 141/67 -- -- KS   12/02/24 0627 -- -- -- -- -- 10 - Worst Possible Pain -- KS   12/02/24 0615 -- 108 98 % 20 131/81 -- -- KS   12/02/24 0603 -- 115 97 % -- 125/64 -- --    12/02/24 0600 99 °F (37.2 °C) -- -- -- -- -- -- KS   12/02/24 0558 -- 119 98 % 18 148/71 -- -- KS            Physical Exam  Vitals and nursing note reviewed.   Constitutional:       General: She is not in acute distress.     Appearance: She is well-developed. She is ill-appearing and diaphoretic. She is not toxic-appearing.   Cardiovascular:      Rate and Rhythm: Regular rhythm. Tachycardia present.   Pulmonary:      Effort: Pulmonary effort is normal.      Breath sounds: Normal breath sounds.   Abdominal:      General: Abdomen is flat. There is no distension.      Palpations: Abdomen is soft.      Tenderness: There is  generalized abdominal tenderness and tenderness in the epigastric area.   Skin:     General: Skin is warm.   Neurological:      General: No focal deficit present.      Mental Status: She is alert and oriented to person, place, and time.         Results Reviewed       Procedure Component Value Units Date/Time    Manual Differential(PHLEBS Do Not Order) [[id number]]  (Abnormal) Collected: 12/02/24 0626    Lab Status: Final result Specimen: Blood from Arm, Left Updated: 12/02/24 0921     Segmented % 93 %      Bands % 1 %      Lymphocytes % 1 %      Monocytes % 5 %      Eosinophils % 0 %      Basophils % 0 %      Absolute Neutrophils 8.64 Thousand/uL      Absolute Lymphocytes 0.09 Thousand/uL      Absolute Monocytes 0.46 Thousand/uL      Absolute Eosinophils 0.00 Thousand/uL      Absolute Basophils 0.00 Thousand/uL      Total Counted --     RBC Morphology Normal     Platelet Estimate Adequate     Large Platelet Present    CMP [[id number]]  (Abnormal) Collected: 12/02/24 0720    Lab Status: Final result Specimen: Blood from Arm, Left Updated: 12/02/24 0743     Sodium 137 mmol/L      Potassium 3.8 mmol/L      Chloride 105 mmol/L      CO2 24 mmol/L      ANION GAP 8 mmol/L      BUN 14 mg/dL      Creatinine 0.49 mg/dL      Glucose 85 mg/dL      Calcium 7.0 mg/dL      Corrected Calcium 7.8 mg/dL      AST 22 U/L      ALT 9 U/L      Alkaline Phosphatase 18 U/L      Total Protein 5.0 g/dL      Albumin 3.0 g/dL      Total Bilirubin 0.43 mg/dL      eGFR 94 ml/min/1.73sq m     Narrative:      National Kid[employer] Foundation guidelines for Chronic Kidney Disease (CKD):     Stage 1 with normal or high GFR (GFR > 90 mL/min/1.73 square meters)    Stage 2 Mild CKD (GFR = 60-89 mL/min/1.73 square meters)    Stage 3A Moderate CKD (GFR = 45-59 mL/min/1.73 square meters)    Stage 3B Moderate CKD (GFR = 30-44 mL/min/1.73 square meters)    Stage 4 Severe CKD (GFR = 15-29 mL/min/1.73 square meters)    Stage 5 End Stage CKD (GFR <15  mL/min/1.73 square meters)  Note: GFR calculation is accurate only with a steady state creatinine    Lipase [[id number]]  (Abnormal) Collected: 12/02/24 0720    Lab Status: Final result Specimen: Blood from Arm, Left Updated: 12/02/24 0743     Lipase 8 u/L     HS Troponin 0hr (reflex protocol) [[id number]]  (Normal) Collected: 12/02/24 0626    Lab Status: Final result Specimen: Blood from Arm, Left Updated: 12/02/24 0701     hs TnI 0hr 5 ng/L     CBC and differential [[id number]]  (Normal) Collected: 12/02/24 0626    Lab Status: Final result Specimen: Blood from Arm, Left Updated: 12/02/24 0640     WBC 9.19 Thousand/uL      RBC 4.20 Million/uL      Hemoglobin 12.6 g/dL      Hematocrit 39.0 %      MCV 93 fL      MCH 30.0 pg      MCHC 32.3 g/dL      RDW 12.3 %      MPV 9.4 fL      Platelets 286 Thousands/uL     Narrative:      This is an appended report.  These results have been appended to a previously verified report.            CT chest abdomen pelvis w contrast   Final Interpretation by Luis Covarrubias MD (12/02 1025)      -Groundglass and tree-in-bud opacities within the posterior right upper and left lower lobes which may be due to aspiration, infectious/inflammation.      -Findings consistent with enterocolitis. No evidence of bowel obstruction.      -Age-indeterminate compression deformities of the T5 and L4 vertebral bodies, although favored to be chronic in nature. Proximal 40% height loss of the L4 vertebral body. Correlate with point tenderness and further imaging if clinically indicated.      -6 mm hypoattenuating lesion within the pancreatic tail. Correlate with MRI of the abdomen with and without contrast for further evaluation.      -Nonspecific 7 mm sclerotic lesion within the posterior T4 vertebral body. If there is history of malignancy correlate with bone scan or MRI of the thoracic spine with and without contrast.      -Increasing size of cystic lesion within the upper mediastinum measuring 1.8 cm  compared to CTA from 7/29/2019. Recommend follow-up CT chest in 6 months to ensure stability.      -Multiple pulmonary nodules measuring up to 3 mm (including guidelines as above), cholelithiasis, mild right hydronephrosis. Additional chronic findings as above.      I personally communicated the findings via telephone with Nawaf Fagan at 10:18 a.m. on 12/2/2024.         Workstation performed: [id number]             ECG 12 Lead Documentation Only    Date/Time: 12/2/2024 6:32 AM    Performed by: Nawaf Fagan MD  Authorized by: Nawaf Fagan MD    Patient location:  ED  Previous ECG:     Previous ECG:  Compared to current    Similarity:  No change  Interpretation:     Interpretation: abnormal    Quality:     Tracing quality:  Limited by artifact  Rate:     ECG rate assessment: tachycardic    Rhythm:     Rhythm: sinus rhythm    Ectopy:     Ectopy: none    QRS:     QRS axis:  Left    QRS intervals:  Normal  Conduction:     Conduction: abnormal      Abnormal conduction: incomplete RBBB    ST segments:     ST segments:  Normal  T waves:     T waves: normal        ED Medication and Procedure Management   Prior to Admission Medications   Prescriptions Last Dose Informant Patient Reported? Taking?   Ascorbic Acid (Vitamin C) 500 MG CAPS  Self Yes No   Sig: Take by mouth in the morning   Patient not taking: Reported on 9/7/2023   Bempedoic Acid (Nexletol) 180 MG TABS  Self Yes No   Sig: Take 180 mg by mouth 3 (three) times a week Mon -wed- fri   Cholecalciferol (VITAMIN D3 PO)  Self Yes No   Sig: Take by mouth daily   Multiple Vitamin (MULTIVITAMIN ADULT PO)  Self Yes No   Sig: in the morning   Patient not taking: Reported on 7/26/2024   PreviDent 5000 Dry Mouth 1.1 % GEL   Yes No   Sig: Take 1 Application by mouth daily at bedtime   cevimeline (EVOXAC) 30 MG capsule  Self Yes No   Sig: Take 1 capsule twice a day by oral route for 30 days.   Patient not taking: Reported on 3/4/2024   clobetasol (TEMOVATE) 0.05 %  ointment  Self No No   Sig: Apply topically 2 (two) times a day   Patient not taking: Reported on 3/4/2024   doxycycline hyclate (VIBRAMYCIN) 50 mg capsule  Self Yes No   Sig: TAKE 1 CAPSULE BY MOUTH EVERY DAY FOR 7 DAYS THEN 1 CAP EVERY OTHER DAY   Patient not taking: Reported on 7/26/2024   doxycycline monohydrate (MONODOX) 50 mg capsule   Yes No   Sig: Take 50 mg by mouth daily   famotidine (PEPCID) 40 MG tablet   No No   Sig: Take 1 tablet (40 mg total) by mouth daily   gabapentin (NEURONTIN) 300 mg capsule   Yes No   Sig: Take 300 mg by mouth daily at bedtime   hydroxychloroquine (PLAQUENIL) 200 mg tablet  Self Yes No   Sig: Take 200 mg by mouth 2 (two) times a day   Patient not taking: Reported on 3/4/2024   hydroxychloroquine (PLAQUENIL) 200 mg tablet  Self Yes No   Sig: Take 1 tablet twice a day by oral route for 30 days.   Patient not taking: Reported on 3/4/2024   levothyroxine 50 mcg tablet  Self Yes No   Sig: Take 50 mcg by mouth daily   methylPREDNISolone (Medrol) 16 mg tablet  Self Yes No   Sig: Medrol (José Luis) 4 mg tablets in a dose pack   Take as directed on packaging   Patient not taking: Reported on 3/4/2024   ondansetron (ZOFRAN) 4 mg tablet   No No   Sig: Take 1 tablet (4 mg total) by mouth every 8 (eight) hours as needed for nausea or vomiting   predniSONE 20 mg tablet   No No   Sig: 3 tabs day 1-3, 2 tabs day 4-6, 1 tab day 7-9   Patient not taking: Reported on 7/26/2024      Facility-Administered Medications: None     Discharge Medication List as of 12/2/2024 10:45 AM        START taking these medications    Details   ondansetron (ZOFRAN-ODT) 4 mg disintegrating tablet Take 1 tablet (4 mg total) by mouth every 6 (six) hours as needed for nausea or vomiting for up to 3 days, Starting Mon 12/2/2024, Until Thu 12/5/2024 at 2359, Normal           CONTINUE these medications which have NOT CHANGED    Details   Ascorbic Acid (Vitamin C) 500 MG CAPS Take by mouth in the morning, Historical Med       Bempedoic Acid (Nexletol) 180 MG TABS Take 180 mg by mouth 3 (three) times a week Mon -wed- fri, Historical Med      cevimeline (EVOXAC) 30 MG capsule Take 1 capsule twice a day by oral route for 30 days., Historical Med      Cholecalciferol (VITAMIN D3 PO) Take by mouth daily, Historical Med      clobetasol (TEMOVATE) 0.05 % ointment Apply topically 2 (two) times a day, Starting Thu 9/7/2023, Normal      doxycycline hyclate (VIBRAMYCIN) 50 mg capsule TAKE 1 CAPSULE BY MOUTH EVERY DAY FOR 7 DAYS THEN 1 CAP EVERY OTHER DAY, Historical Med      doxycycline monohydrate (MONODOX) 50 mg capsule Take 50 mg by mouth daily, Starting Mon 7/8/2024, Historical Med      famotidine (PEPCID) 40 MG tablet Take 1 tablet (40 mg total) by mouth daily, Starting Mon 10/28/2024, Normal      gabapentin (NEURONTIN) 300 mg capsule Take 300 mg by mouth daily at bedtime, Starting Mon 7/8/2024, Historical Med      !! hydroxychloroquine (PLAQUENIL) 200 mg tablet Take 200 mg by mouth 2 (two) times a day, Starting Thu 8/31/2023, Historical Med      !! hydroxychloroquine (PLAQUENIL) 200 mg tablet Take 1 tablet twice a day by oral route for 30 days., Historical Med      levothyroxine 50 mcg tablet Take 50 mcg by mouth daily, Historical Med      methylPREDNISolone (Medrol) 16 mg tablet Medrol (José Luis) 4 mg tablets in a dose pack   Take as directed on packaging, Historical Med      Multiple Vitamin (MULTIVITAMIN ADULT PO) in the morning, Historical Med      ondansetron (ZOFRAN) 4 mg tablet Take 1 tablet (4 mg total) by mouth every 8 (eight) hours as needed for nausea or vomiting, Starting Fri 11/29/2024, Normal      predniSONE 20 mg tablet 3 tabs day 1-3, 2 tabs day 4-6, 1 tab day 7-9, Normal      PreviDent 5000 Dry Mouth 1.1 % GEL Take 1 Application by mouth daily at bedtime, Starting Wed 7/17/2024, Historical Med       !! - Potential duplicate medications found. Please discuss with provider.        No discharge procedures on file.  ED SEPSIS  DOCUMENTATION   Time reflects when diagnosis was documented in both MDM as applicable and the Disposition within this note       Time User Action Codes Description Comment    12/2/2024 10:35 AM Nawaf Fagan Add [R10.9] Abdominal pain     12/2/2024 10:35 AM Nawaf Fagan Add [K52.9] Enterocolitis     12/2/2024 10:36 AM Nawaf Fagan Add [R11.2] Nausea and vomiting     12/2/2024 10:36 AM Nawaf Fagan Modify [R10.9] Abdominal pain     12/2/2024 10:36 AM Nawaf Fagan Modify [K52.9] Enterocolitis     12/2/2024 10:36 AM Nawaf Fagan Add [S22.000A] Compression fracture of body of thoracic vertebra (HCC)     12/2/2024 10:36 AM Nawaf Fagan Add [S32.040A] Compression fracture of L4 vertebra, initial encounter (Formerly [facility])     12/2/2024 10:37 AM Nawaf Fagan Remove [S22.000A] Compression fracture of body of thoracic vertebra (HCC)     12/2/2024 10:37 AM Nawaf Fagan Add [S22.050A] Compression fracture of T5 vertebra, initial encounter ([facility])     12/2/2024 10:37 AM Nawaf Fagan Add [R93.89] Abnormal CT scan                  Nawaf Fagan MD  12/02/24 5684

## 2024-12-11 ENCOUNTER — OFFICE VISIT (OUTPATIENT)
Dept: GASTROENTEROLOGY | Facility: MEDICAL CENTER | Age: 79
End: 2024-12-11
Payer: MEDICARE

## 2024-12-11 VITALS
DIASTOLIC BLOOD PRESSURE: 69 MMHG | OXYGEN SATURATION: 98 % | TEMPERATURE: 97.9 F | SYSTOLIC BLOOD PRESSURE: 110 MMHG | HEART RATE: 87 BPM | WEIGHT: 107.4 LBS | BODY MASS INDEX: 20.29 KG/M2

## 2024-12-11 DIAGNOSIS — R13.10 DYSPHAGIA, UNSPECIFIED TYPE: Primary | ICD-10-CM

## 2024-12-11 DIAGNOSIS — K86.2 PANCREATIC CYST: ICD-10-CM

## 2024-12-11 DIAGNOSIS — K21.9 GASTROESOPHAGEAL REFLUX DISEASE WITHOUT ESOPHAGITIS: ICD-10-CM

## 2024-12-11 PROCEDURE — 99214 OFFICE O/P EST MOD 30 MIN: CPT | Performed by: NURSE PRACTITIONER

## 2024-12-11 NOTE — ASSESSMENT & PLAN NOTE
"History of chronic reflux and 5 cm hiatal hernia.  Was on a PPI for few months which helped her symptoms but she is not able to take her doxycycline.  She stopped the omeprazole and is using Pepcid 40 mg which helps at times.  She is reluctant to stay on any medications long-term.  Recently had a \"stomach bug\" and was in ER for nausea, vomiting and diarrhea.   had same symptoms.  Nausea vomiting and diarrhea resolved but she is still fatigued with general malaise.    -Restart Pepcid 40 mg daily  -Antireflux diet  -Follow-up in 3 to 4 months or sooner if needed     "

## 2024-12-11 NOTE — PROGRESS NOTES
"Name: Sumi Lee      : 1945      MRN: [id number]  Encounter Provider: NOEMÍ Harmon  Encounter Date: 2024   Encounter department: [facility]  :  Assessment & Plan  Dysphagia, unspecified type  Has had a longstanding history of esophageal dysphagia secondary to her Sjogren's disease. She did undergo an EGD  which was normal. A pH/manometry study was recommended at that time but was never done. She continues with intermittent bouts of esophageal dysphagia with solids that eventually advance with liquids.  Recently had an SLP eval.  Report reveals no aspiration 90% of the time.  She is scheduled for a video swallow with speech.  Recent EGD noted a 5 cm hiatal hernia and erosive gastritis.  Biopsies were negative for celiac, H. pylori and Moses's esophagus.       Gastroesophageal reflux disease without esophagitis  History of chronic reflux and 5 cm hiatal hernia.  Was on a PPI for few months which helped her symptoms but she is not able to take her doxycycline.  She stopped the omeprazole and is using Pepcid 40 mg which helps at times.  She is reluctant to stay on any medications long-term.  Recently had a \"stomach bug\" and was in ER for nausea, vomiting and diarrhea.   had same symptoms.  Nausea vomiting and diarrhea resolved but she is still fatigued with general malaise.    -Restart Pepcid 40 mg daily  -Antireflux diet  -Follow-up in 3 to 4 months or sooner if needed       Pancreatic cyst  Recent CT chest, abdomen pelvis noted 6 mm hypoattenuating lesion within the pancreatic tail. Correlate with MRI of the abdomen with and without contrast for further evaluation.    Orders:  •  MRI abdomen w wo contrast and mrcp; Future        History of Present Illness   HPI  Sumi Lee is a 79 y.o. female who presents for follow-up.  She was last seen by myself  for epigastric pain and belching.    Has had a longstanding history of " esophageal dysphagia secondary to her Sjogren's disease. She did undergo an EGD 8/21 which was normal. A pH/manometry study was recommended at that time but was never done. She continues with intermittent bouts of esophageal dysphagia with solids that eventually advance with liquids.     Labs 12/24-CMP normal other than creatinine 0.49, calcium 7.8, alk phos 18, total protein 5, albumin 3, lipase 8, CBC normal.    CT chest abdomen pelvis 12/24-  -Groundglass and tree-in-bud opacities within the posterior right upper and left lower lobes which may be due to aspiration, infectious/inflammation.     -Findings consistent with enterocolitis. No evidence of bowel obstruction.     -Age-indeterminate compression deformities of the T5 and L4 vertebral bodies, although favored to be chronic in nature. Proximal 40% height loss of the L4 vertebral body. Correlate with point tenderness and further imaging if clinically indicated.     -6 mm hypoattenuating lesion within the pancreatic tail. Correlate with MRI of the abdomen with and without contrast for further evaluation.     -Nonspecific 7 mm sclerotic lesion within the posterior T4 vertebral body. If there is history of malignancy correlate with bone scan or MRI of the thoracic spine with and without contrast.     -Increasing size of cystic lesion within the upper mediastinum measuring 1.8 cm compared to CTA from 7/29/2019. Recommend follow-up CT chest in 6 months to ensure stability.     -Multiple pulmonary nodules measuring up to 3 mm (including guidelines as above), cholelithiasis, mild right hydronephrosis. Additional chronic findings as above.    Prior EGD/colonoscopy     EGD 7/24- Irregular Z-line; performed cold forceps biopsy  5 cm hiatal hernia  Moderate edematous, erythematous, ulcerated mucosa in the body of the stomach and antrum; performed cold forceps biopsy  The duodenum appeared normal.   Biopsies negative for celiac and H. pylori.  Biopsies also negative for  Moses's esophagus.     EGD 8/21-Z-line, normal esophagus, stomach and duodenum.    History obtained from: patient    Review of Systems   Constitutional:  Positive for appetite change and fatigue.   Gastrointestinal:  Positive for constipation, nausea and vomiting.   All other systems reviewed and are negative.    Medical History Reviewed by provider this encounter:  Tobacco  Allergies  Meds  Problems  Med Hx  Surg Hx  Fam Hx     .  Current Outpatient Medications on File Prior to Visit   Medication Sig Dispense Refill   • Bempedoic Acid (Nexletol) 180 MG TABS Take 180 mg by mouth 3 (three) times a week Mon -wed- fri     • Cholecalciferol (VITAMIN D3 PO) Take by mouth daily     • doxycycline monohydrate (MONODOX) 50 mg capsule Take 50 mg by mouth daily     • gabapentin (NEURONTIN) 300 mg capsule Take 300 mg by mouth daily at bedtime     • levothyroxine 50 mcg tablet Take 50 mcg by mouth daily     • ondansetron (ZOFRAN) 4 mg tablet Take 1 tablet (4 mg total) by mouth every 8 (eight) hours as needed for nausea or vomiting 30 tablet 1   • ondansetron (ZOFRAN) 4 mg tablet Take 1 tablet (4 mg total) by mouth every 6 (six) hours 12 tablet 0   • Ascorbic Acid (Vitamin C) 500 MG CAPS Take by mouth in the morning (Patient not taking: Reported on 9/7/2023)     • cevimeline (EVOXAC) 30 MG capsule Take 1 capsule twice a day by oral route for 30 days. (Patient not taking: Reported on 3/4/2024)     • clobetasol (TEMOVATE) 0.05 % ointment Apply topically 2 (two) times a day (Patient not taking: Reported on 3/4/2024) 45 g 2   • doxycycline hyclate (VIBRAMYCIN) 50 mg capsule TAKE 1 CAPSULE BY MOUTH EVERY DAY FOR 7 DAYS THEN 1 CAP EVERY OTHER DAY (Patient not taking: Reported on 7/26/2024)     • famotidine (PEPCID) 40 MG tablet Take 1 tablet (40 mg total) by mouth daily 90 tablet 1   • hydroxychloroquine (PLAQUENIL) 200 mg tablet Take 200 mg by mouth 2 (two) times a day (Patient not taking: Reported on 3/4/2024)     •  hydroxychloroquine (PLAQUENIL) 200 mg tablet Take 1 tablet twice a day by oral route for 30 days. (Patient not taking: Reported on 3/4/2024)     • methylPREDNISolone (Medrol) 16 mg tablet Medrol (José Luis) 4 mg tablets in a dose pack   Take as directed on packaging (Patient not taking: Reported on 3/4/2024)     • Multiple Vitamin (MULTIVITAMIN ADULT PO) in the morning (Patient not taking: Reported on 7/26/2024)     • predniSONE 20 mg tablet 3 tabs day 1-3, 2 tabs day 4-6, 1 tab day 7-9 (Patient not taking: Reported on 7/26/2024) 18 tablet 0   • PreviDent 5000 Dry Mouth 1.1 % GEL Take 1 Application by mouth daily at bedtime       No current facility-administered medications on file prior to visit.      Social History     Tobacco Use   • Smoking status: Never   • Smokeless tobacco: Never   Vaping Use   • Vaping status: Never Used   Substance and Sexual Activity   • Alcohol use: Not Currently     Comment: few x year   • Drug use: Never   • Sexual activity: Yes     Partners: Male     Birth control/protection: None        Objective   /69 (BP Location: Left arm)   Pulse 87   Temp 97.9 °F (36.6 °C)   Wt 48.7 kg (107 lb 6.4 oz)   SpO2 98%   BMI 20.29 kg/m²      Physical Exam  Abdominal:      General: Bowel sounds are normal.      Palpations: Abdomen is soft.      Tenderness: There is abdominal tenderness in the epigastric area. There is no guarding or rebound.

## 2024-12-13 ENCOUNTER — TELEPHONE (OUTPATIENT)
Facility: MEDICAL CENTER | Age: 79
End: 2024-12-13

## 2024-12-13 NOTE — TELEPHONE ENCOUNTER
Note in chart after leaving a message for someone to schedule there MRI appointment:  I left a message today  to call me at [phone] to schedule their MRI appointment.

## 2024-12-16 ENCOUNTER — TELEPHONE (OUTPATIENT)
Facility: MEDICAL CENTER | Age: 79
End: 2024-12-16

## 2024-12-16 NOTE — TELEPHONE ENCOUNTER
Note in chart after scheduling stim:  Topics covered in our conversation:  MRI scheduled on: 1/27/24@12 @ [facility]  Reminded to get x-ray follow-up prior to MRI appt.  Reminded to charge stim and controller and bring controller to appt.  We will not be able to scan safely w/out putting stim in MRI mode.

## 2024-12-19 DIAGNOSIS — Z00.6 ENCOUNTER FOR EXAMINATION FOR NORMAL COMPARISON OR CONTROL IN CLINICAL RESEARCH PROGRAM: ICD-10-CM

## 2024-12-23 ENCOUNTER — OFFICE VISIT (OUTPATIENT)
Dept: PHYSICAL THERAPY | Facility: MEDICAL CENTER | Age: 79
End: 2024-12-23
Payer: MEDICARE

## 2024-12-23 DIAGNOSIS — G44.86 CERVICOGENIC HEADACHE: ICD-10-CM

## 2024-12-23 DIAGNOSIS — G51.8 CRANIOFACIAL PAIN SYNDROME: Primary | ICD-10-CM

## 2024-12-23 DIAGNOSIS — R51.9 CRANIOFACIAL PAIN: ICD-10-CM

## 2024-12-23 PROCEDURE — 97140 MANUAL THERAPY 1/> REGIONS: CPT | Performed by: PHYSICAL THERAPIST

## 2024-12-23 PROCEDURE — 97112 NEUROMUSCULAR REEDUCATION: CPT | Performed by: PHYSICAL THERAPIST

## 2024-12-23 NOTE — PROGRESS NOTES
Daily Note     Today's date: 2024  Patient name: Sumi Lee  : 1945  MRN: [id number]  Referring provider: Harvinder Ventura MD  Dx:   Encounter Diagnosis     ICD-10-CM    1. 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 Craniofacial pain  R51.9       3. 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 - in progress  Patient will be able to manage symptoms independently. - in progress  Patient will be able to eat without limitation due to pain. - in progress  Patient will be able to chew without limitation due to pain. - in progress      Plan: Reassess in 4 weeks.      Subjective: Patient reports she was doing better but then had a setback and is unsure if how her jaw is doing now.      Objective: See treatment diary below  ROM: pain with movement  Opening (mm): 28 inconsistently  Lateral excursion, left (mm): 8 and pain  Lateral excursion, right (mm): 8 and pain   ROM comments: Measurements fluctuate considerably between repetitions    Precautions: High symptom irritability, central sensitization and anxiety  Date:         Manual            MFR to craniocervical mm.  SK                                                         Neuromuscular Re-education           TMJ relaxed position SK        TMJ controlled opening SK        Cervical retraction SK        Levator labii contractions SK         TMJ lateral excursion          TMJ rhythmic stab           vowel enunciation SK                     Therapeutic Exercise                                                           Therapeutic Activities                                   Gait Training                                   Modalities

## 2025-01-17 DIAGNOSIS — K21.9 GASTROESOPHAGEAL REFLUX DISEASE WITHOUT ESOPHAGITIS: Primary | ICD-10-CM

## 2025-01-22 DIAGNOSIS — K52.9 ENTEROCOLITIS: ICD-10-CM

## 2025-01-22 DIAGNOSIS — R11.2 NAUSEA AND VOMITING: ICD-10-CM

## 2025-01-22 RX ORDER — ONDANSETRON 4 MG/1
4 TABLET, FILM COATED ORAL EVERY 6 HOURS
Qty: 20 TABLET | Refills: 1 | Status: SHIPPED | OUTPATIENT
Start: 2025-01-22

## 2025-01-28 ENCOUNTER — HOSPITAL ENCOUNTER (OUTPATIENT)
Dept: RADIOLOGY | Facility: HOSPITAL | Age: 80
Discharge: HOME | End: 2025-01-28

## 2025-01-28 DIAGNOSIS — K86.2 PANCREATIC CYST: ICD-10-CM

## 2025-02-15 DIAGNOSIS — K21.9 GASTROESOPHAGEAL REFLUX DISEASE WITHOUT ESOPHAGITIS: ICD-10-CM

## 2025-02-18 ENCOUNTER — PATIENT MESSAGE (OUTPATIENT)
Dept: UROLOGY | Facility: CLINIC | Age: 80
End: 2025-02-18

## 2025-02-18 NOTE — PATIENT COMMUNICATION
Encounter from 2023 was used.          Nisa PEREZ    2/18/25  1:10 PM  Note     Summary: issues with inter STENT   Pt called in stating that she's having issues with inter stem that was placed by Dr. Forbes last year. The stem wont go into MRI mode and the pt is requesting for a CB from our clinical team.     Pt cb: [phone]          2/18/25  1:10 PM  Nisa Leigh routed this conversation to [facility] [place] [facility]     2/18/25  1:13 PM  Janel Green RN routed this conversation to [facility] [place] Provider  [facility] [place] [facility]   NOEMÍ Padgett       2/18/25  1:44 PM  Note     Patient should reach out to [employer] (device company for patients interstim)  to see if they can trouble shoot device           2/18/25  1:44 PM  NOEMÍ Padgett routed this conversation to [facility]

## 2025-02-24 ENCOUNTER — TELEPHONE (OUTPATIENT)
Age: 80
End: 2025-02-24

## 2025-02-27 DIAGNOSIS — R11.0 NAUSEA: ICD-10-CM

## 2025-02-27 DIAGNOSIS — K21.9 GASTROESOPHAGEAL REFLUX DISEASE WITHOUT ESOPHAGITIS: ICD-10-CM

## 2025-02-27 RX ORDER — ONDANSETRON 4 MG/1
4 TABLET, FILM COATED ORAL EVERY 8 HOURS PRN
Qty: 30 TABLET | Refills: 1 | Status: SHIPPED | OUTPATIENT
Start: 2025-02-27

## 2025-02-27 RX ORDER — OMEPRAZOLE 20 MG/1
20 CAPSULE, DELAYED RELEASE ORAL DAILY
Qty: 90 CAPSULE | Refills: 1 | Status: SHIPPED | OUTPATIENT
Start: 2025-02-27

## 2025-04-29 ENCOUNTER — TELEPHONE (OUTPATIENT)
Facility: MEDICAL CENTER | Age: 80
End: 2025-04-29

## 2025-04-29 NOTE — TELEPHONE ENCOUNTER
6-10-25 AT 1200PM AT [facility]  Note in chart after scheduling stim:  Topics covered in our conversation:  MRI scheduled on the above date and time.  Reminded to get x-ray follow-up prior to MRI appt.  Reminded to charge stim and controller and bring controller to appt.  We will not be able to scan safely w/out putting stim in MRI mode.

## 2025-04-29 NOTE — QUICK NOTE
Device investigated:   [employer] stimulator and leads            Method investigated (surgical report, imaging report, vendor contacted, website used etc):  Chart review    Time spent investigating in minutes: 15    Investigation findings:  MRI Conditional implant    Risk vs Benefit performed.  If so, list physician and outcome:  No

## 2025-06-02 NOTE — PROGRESS NOTES
Name: Sumi Lee      : 1945      MRN: [id number]  Encounter Provider: Marcella Howe MD  Encounter Date: 6/3/2025   Encounter department: NEUROLOGY [facility]  :  Assessment & Plan  Muscle spasm  This patient has a long history of muscle spasm.  She has had fibromyalgia symptoms since her childhood but was diagnosed at age 45 when it became more of a known entity.    She has had normal CK with low aldolase in the past.  She has had borderline + MICHAEL.  She tells me SSA and SSB were negative.    She has had terrible myalgias when she took statin in the past.  That improved after discontinuing statin.  About 10 years ago, her symptoms recurred after starting Prolia and the symptoms never resolved.    Autoimmune myositis due to HMG CR antibody seems unlikely given normal CK.  Stiff person syndrome also seems less likely given such chronic symptoms without necessitating IVIG or Valium.  We will check EMG to evaluate for myotonia or myopathic units.  I would not be surprised if she had a neurogenic component on EMG given underlying cervical and lumbar spine disease.    The patient had been taking magnesium in the past.  When her level was checked it was elevated.  Her level was checked because she was overly weak.  She has since stopped taking magnesium.    She has already tried baclofen, tizanidine, cyclobenzaprine, low-dose benzodiazepines, Cymbalta, amitriptyline, nortriptyline without significant improvement.  She cannot tolerate gabapentin greater than 400 mg daily due to excessive fatigue.    I have asked the EMG  to contact the patient.  The patient's mother is currently hospitalized and she will let us know if any scheduling changes.    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  She was diagnosed with focal severe atherosclerotic stenosis of the distal left cavernous and proximal supraclinoid ICA junction.  She saw a vascular neurosurgeon and no surgical intervention was recommended.  She is not able to tolerate statins.  She is now taking bempedoic acid which she tells me has significantly improved her cholesterol.  Most recent LDL from January 2025 is 87.  Goal LDL should be approximately 70.         Follow-up in 3 months or sooner if difficulties.  If the patient has to reschedule the EMG to a later date, we can reschedule this follow-up for after the EMG.    Assessment & Plan          History of Present Illness   History of Present Illness  Mrs Lee is a 79 yr old lady who presents today for new neuromuscular evaluation for muscle spasms. She is a self referral. Dr. Yesenia Chamberlain, a nutritionist, suggested that the patient see a neuromuscular neurologist.     Ten years ago, she began Prolia treatment, which exacerbated her pre-existing muscle pain. She has experienced upper body weakness and pain throughout her life, despite having a strong lower body. The Prolia treatment resulted in widespread pain from spastic muscles, severe leg cramps, and difficulty walking. Her endocrinologist attributed these symptoms to fibromyalgia, a diagnosis she received at age 45. However, she believes the symptoms are more akin to those experienced with statin intolerance. She sought help from Dr. Pruett, a physiatrist in [place], who incorporated yoga into his treatment program. She underwent lab work that revealed hypocalcemia and hyperparathyroidism. Even after normalizing her calcium levels, her symptoms persisted.    Currently, she struggles to walk a quarter of a mile and experiences pain in both her upper and lower body. She also reports difficulty opening her mouth, with symptom severity varying day by day. She notes that less sleep results in  less tightness, but good sleep leads to stiffness and difficulty getting out of bed. She often spends most of her day in bed due to fatigue and painful tightness.     She has always had difficulty with chewing, swallowing, breathing, and gastrointestinal issues. She experienced projectile vomiting during her first year of life and continues to experience nausea and spasms. Despite undergoing various tests, including an upper GI, all results have been negative. She has lost weight due to difficulty swallowing, dropping from 112 to 109 pounds.     She has tried various treatments for her spasms, including benzodiazepines, Cymbalta, Elavil, nortriptyline, baclofen, Flexeril, tizanidine, and magnesium, but found them ineffective or intolerable. She currently takes gabapentin for neuropathic symptoms in her feet and hands.She was advised against benzodiazepines due to hypoxia. She cannot tolerate higher doses of gabapentin due to fatigue. She took magnesium in the past and later found out she had very high levels of magnesium.    She has had fibromyalgia since childhood and was diagnosed when she was 45.    She has had Sjogren's symptoms all her life, but her labs are inconclusive.Her mouth is very dry. She refused to have a lip biopsy.    She has a bladder pacemaker and interstitial cystitis. She has an InterStim, which is not working well right now because she thinks her pelvic floor is out of alignment.        PMH: fibromyalgia, scoliosis, IBS, interstitial cystitis, low + MICHAEL, polyarthritis, osteoporosis, cervical disc disease, chronic cervicogenic headaches, pancreatic cyst, cataract, compression fracture thoracic spine, endometriosis, macular degeneration, GERD, HLD, hypothyroid, shingles 2019, sleep apnea  PSH: Appendectomy, bilateral cataracts, colonoscopy 2005, pacemaker, bladder pacemaker, oophorectomy, hysterectomy, skin cancer excision, upper endoscopy 2024, hiatal hernia/ulcer, limb lengthening surgery in  2015.  Family: Parkinson's disease (brother) hip fracture (mother), macular degeneration, glaucoma, A-fib, osteoporosis, stroke, alcohol abuse, bladder cancer  Social: Non-smoker.  Occasional alcohol.  No drug use.       Review of Systems   Constitutional:  Positive for fatigue and unexpected weight change.   HENT:  Positive for trouble swallowing.    Respiratory:  Positive for cough and shortness of breath.    Gastrointestinal:  Positive for nausea.   Musculoskeletal:  Positive for myalgias.   Psychiatric/Behavioral:  Positive for sleep disturbance.     I have personally reviewed the MA's review of systems and made changes as necessary.         Objective   /78 (BP Location: Left arm, Patient Position: Sitting, Cuff Size: Adult)   Pulse 82   Temp (!) 96.4 °F (35.8 °C) (Temporal)   Wt 49.4 kg (109 lb)   SpO2 97%   BMI 20.60 kg/m²     Physical Exam  Constitutional:       Appearance: Normal appearance.   HENT:      Right Ear: Hearing normal.      Left Ear: No hearing     Mouth/Throat:      Mouth: Mucous membranes are moist.      Pharynx: Oropharynx is clear.     Eyes:      General: Lids are normal.      Extraocular Movements: Extraocular movements intact.      Conjunctiva/sclera: Conjunctivae normal.      Pupils: Pupils are equal, round, and reactive to light.     Neck:      Vascular: No carotid bruit.     Cardiovascular:      Rate and Rhythm: Normal rate and regular rhythm.      Heart sounds: Normal heart sounds.   Pulmonary:      Effort: Pulmonary effort is normal.      Breath sounds: Normal breath sounds.     Musculoskeletal:      Comments: Left trapezius in spasm     Skin:     General: Skin is warm and dry.     Neurological:      Deep Tendon Reflexes:      Reflex Scores:       Tricep reflexes are 1+ on the right side and 1+ on the left side.       Bicep reflexes are 2+ on the right side and 2+ on the left side.       Brachioradialis reflexes are 2+ on the right side and 2+ on the left side.        Patellar reflexes are 1+ on the right side and 1+ on the left side.       Achilles reflexes are 0 on the right side and 0 on the left side.    Psychiatric:         Mood and Affect: Mood normal.         Speech: Speech normal.         Behavior: Behavior normal.       Neurological Exam  Mental Status   Oriented to person, place, time and situation. Recent and remote memory are intact. Speech is normal. Language is fluent with no aphasia. Attention and concentration are normal. Fund of knowledge is appropriate for level of education. Apraxia absent.    Cranial Nerves  CN II: Visual fields full to confrontation.  CN III, IV, VI: Extraocular movements intact bilaterally. Normal lids and orbits bilaterally. Pupils equal round and reactive to light bilaterally.  CN V: Facial sensation is normal.  CN VII: Full and symmetric facial movement.  CN VIII:  Right: Hearing is normal.  Left: Hearing is decreased.  CN IX, X: Palate elevates symmetrically  CN XI: Shoulder shrug strength is normal.  CN XII: Tongue midline without atrophy or fasciculations.    Motor   No fasciculations present. Normal muscle tone. No abnormal involuntary movements.                                               Right                     Left   Shoulder abduction               4-                          5  Elbow flexion                         4-                          4+  Elbow extension                    4-                          4+  Knee extension                      3                           Plantarflexion                         4+                          5-  Dorsiflexion                            4+                          5-   4/5 bilaterally  R shoulder lower than left.    Sensory  Light touch is normal in upper and lower extremities. Temperature abnormality: Temperature reduced on the right arm. Vibration abnormality: Vibration 12 sec on the R and 10 sec at the left great toe.     Reflexes                                             Right                      Left  Brachioradialis                    2+                         2+  Biceps                                 2+                         2+  Triceps                                1+                         1+  Patellar                                1+                         1+  Achilles                                0                         0  Right Plantar: downgoing  Left Plantar: downgoing    Coordination  Right: Finger-to-nose normal. Heel-to-shin abnormality: Difficulty on the right due to weakness.    Gait  Casual gait: Wide stance.  Limping.    Physical Exam      Results  Data:  Component  Ref Range & Units 5/28/25 0839   Hemoglobin  11.5 - 14.5 g/dL 13.2   Hematocrit  35.0 - 43.0 % 39.4   WBC  4.0 - 10.0 thou/cmm 6.1   RBC  3.70 - 4.70 mill/cmm 4.18   Platelet  140 - 350 thou/cmm 287   MPV  7.5 - 11.3 fL 7.7   MCV  80 - 100 fL 94     Component  Ref Range & Units 5/28/25 0839   FERRITIN  11.0 - 306.8 ng/mL 308 High      Component  Ref Range & Units (hover) 4/30/25 0833 1/23/25 0728 9/24/24 0820 6/21/24 0722 3/19/24 0953 11/28/23 0814 6/13/23 0817   TSH 2.36 2.46 1.78 3.85 CM 2.64 2.53 3.67     Component  Ref Range & Units 4/30/25 0833   Vitamin B1, Whole Blood  70 - 180 nmol/L 151   CK,  TOTAL  Order: [id number]  Component  Ref Range & Units 4/30/25 0833   CK, Total  30 - 223 U/L 71     Component  Ref Range & Units 4/30/25 0833   Aldolase  3.3 - 10.3 U/L 2.7 Low      Component  Ref Range & Units 1/27/25 1235   VITAMIN B12  180 - 914 pg/mL 439     Component  Ref Range & Units (hover) 1/23/25 0728 12/2/24 0720 10/7/24 0854 6/21/24 0723 6/12/24 1026 7/14/23 0826 6/13/23 0817   Glucose 77 85 R, CM 96 85 120 High  75 78   BUN 13 14 R 18 16 18 14 11   Creatinine 0.59 0.49 Low  R, CM 0.66 0.65 0.55 0.77 0.73   Sodium 143 137 R 143 143 142 144 143   Potassium 4 3.8 R, CM 4.0 4.0 3.9 3.8 4.2   Chloride 103 105 R 103 105 102 105 106   Carbon Dioxide 32 High  24 R 27 27 31 31 29    Calcium 9.5 7.0 Low  R 9.9 R 9.6 R 9.9 R 9.4 R 9.3 R   Alkaline Phosphatase 29 Low  18 Low  R  31 Low  32 Low   29 Low    ALBUMIN 4.3 3.0 Low  R  4.3 4.4  4.2   Total Bilirubin 0.5 0.43 R, CM  0.5 CM 0.4 CM  0.5 CM   Comment: Eltrombopag and its metabolites may interfere with this assay causing erroneously high patient results.   Protein, Total 6.5 5.0 Low  R  6.8 6.8  6.6   AST 16 22 R, CM  19 13  20   ALT 11 9 R, CM  12 10  14   ANCA - ASSOCIATED VASCULITIS PROFILE  Order: [id number]  Component  Ref Range & Units 1/23/25 0726   Myeloperox Antibodies, IgG  0 - 19 AU/mL 0   Comment: (Note)  INTERPRETIVE INFORMATION: Myeloperoxidase Abs, IgG   19 AU/mL or Less ......... Negative   20-25 AU/mL .............. Equivocal   26 AU/mL or Greater ...... Positive  Approximately 90% of patients with a P-ANCA pattern by IFA  have antibodies specific for MPO.   Serine Protease3, IgG  0 - 19 AU/mL 0   Comment: (Note)  INTERPRETIVE INFORMATION: Serine Proteinase 3, IgG   19 AU/mL or Less ........ Negative   20-25 AU/mL ............. Equivocal   26 AU/mL or Greater ..... Positive  Approximately 85% of patients with a C-ANCA pattern by IFA  have antibodies specific for PR3.   ANCA IFA Pattern  None Detected None Detected   Comment: (Note)  INTERPRETIVE INFORMATION: ANCA IFA Pattern  Neutrophil Cytoplasmic Antibodies (C-ANCA = granular  cytoplasmic staining, P-ANCA = perinuclear staining) are  found in the serum of over 90 percent of patients with  certain necrotizing systemic vasculitides, and usually in  less than 5 percent of patients with collagen vascular  disease or arthritis.  Performed By: [facility]  [address] [place] [id number]  : Jenaro Moore MD, PhD  CLIA Number: [id number]   ANCA IFA Titer  <1:20 <1:20     Component  Ref Range & Units (hover) 6/21/24 0722 10/3/19 0743 10/3/19 0000   Hemoglobin A1C 5.2 5.4 CM 5.4 R     Component  Ref Range & Units 10/4/24 1215    Magnesium  1.4 - 2.2 mg/dL 2.2     Component  Ref Range & Units 1/23/25 0728   Cholesterol  <200 mg/dL 169   Triglycerides  <150 mg/dL 83   Cholesterol, HDL, Direct  23 - 92 mg/dL 65   Cholesterol, Non-HDL  <160 mg/dL 104   LDL Cholesterol  <130 mg/dL 87     Transcranial Doppler 2/20/2025:  Impression: Intracranial flow velocities and flow waveforms are within normal   limits. No evidence of vasospasm or intracranial stenosis on this study.     Carotid ultrasound 2/20/2025:  RIGHT internal carotid artery is normal.   LEFT internal carotid artery is normal.   Right vertebral artery with normally directed antegrade flow.      Left vertebral artery with normally directed antegrade flow.     Bilateral temporal arteries patent without ultrasound features concerning   for giant cell arteritis. Clinical correlation is recommended.     CT C-spine 3/6/2023:  1. Stable degenerative changes in the cervical spine without prominent spinal   canal narrowing identified.   2. Mild neural foraminal narrowing at C3-C4 and C5-C6.     CT lumbar spine 3/6/2023:  1. Stable grade 1 anterolisthesis of L3 over L4, secondary to bilateral pars   interarticularis defects.   2. No prominent spinal stenosis in the lumbar region.   3. Neural foraminal narrowing most notably at L3-L4, with impingement on the   right greater than left exiting L3 nerve roots.     CTA head and neck 7/29/2019:  1. Focal severe atherosclerotic stenosis within the distal left cavernous and proximal supraclinoid ICA junction.  Consider vascular/neurosurgical/neuro interventional consultation.  2.  Patent cervical carotid and vertebral artery circulation.  3.  No aberrant intracranial vasculature or anatomic variant to result in the patient's reported tinnitus.        Administrative Statements   I have spent a total time of 55 minutes in caring for this patient on the day of the visit/encounter including Diagnostic results, Risks and benefits of tx options, Impressions,  Counseling / Coordination of care, Documenting in the medical record, Reviewing/placing orders in the medical record (including tests, medications, and/or procedures), and Obtaining or reviewing history  .

## 2025-06-03 ENCOUNTER — OFFICE VISIT (OUTPATIENT)
Dept: NEUROLOGY | Facility: CLINIC | Age: 80
End: 2025-06-03
Payer: MEDICARE

## 2025-06-03 VITALS
WEIGHT: 109 LBS | TEMPERATURE: 96.4 F | SYSTOLIC BLOOD PRESSURE: 116 MMHG | BODY MASS INDEX: 20.6 KG/M2 | OXYGEN SATURATION: 97 % | HEART RATE: 82 BPM | DIASTOLIC BLOOD PRESSURE: 78 MMHG

## 2025-06-03 DIAGNOSIS — I65.22 STENOSIS OF CAVERNOUS PORTION OF LEFT INTERNAL CAROTID ARTERY: ICD-10-CM

## 2025-06-03 DIAGNOSIS — M62.838 MUSCLE SPASM: Primary | ICD-10-CM

## 2025-06-03 PROCEDURE — 99204 OFFICE O/P NEW MOD 45 MIN: CPT | Performed by: PSYCHIATRY & NEUROLOGY

## 2025-06-03 PROCEDURE — G2211 COMPLEX E/M VISIT ADD ON: HCPCS | Performed by: PSYCHIATRY & NEUROLOGY

## 2025-06-03 NOTE — ASSESSMENT & PLAN NOTE
She had imaging of the intracranial and extracranial due to tinnitus.  She was diagnosed with focal severe atherosclerotic stenosis of the distal left cavernous and proximal supraclinoid ICA junction.  She saw a vascular neurosurgeon and no surgical intervention was recommended.  She is not able to tolerate statins.  She is now taking bempedoic acid which she tells me has significantly improved her cholesterol.  Most recent LDL from January 2025 is 87.  Goal LDL should be approximately 70.         Follow-up in 3 months or sooner if difficulties.  If the patient has to reschedule the EMG to a later date, we can reschedule this follow-up for after the EMG.

## 2025-06-04 ENCOUNTER — NURSE TRIAGE (OUTPATIENT)
Age: 80
End: 2025-06-04

## 2025-06-04 NOTE — TELEPHONE ENCOUNTER
"REASON FOR CONVERSATION: Skin Problem    SYMPTOMS: Vulvar and perineal skin tags. Evaluated by Dr. Wiley in 2023 and at that no intervention was needed or recommended. Patient would like tags reevlauated due to seemingly more tags and some seem larger than they used to be. Unsure if intervention would be recommended at this time    OTHER HEALTH INFORMATION: history Lichen Sclerosus    PROTOCOL DISPOSITION: No disposition on file.    CARE ADVICE PROVIDED: Call if symptoms worsen, bleed, or become painful    PRACTICE FOLLOW-UP: appointment scheduled on 8/21. Patient verbalizes non-urgent and states August if okay for appointment.          Answer Assessment - Initial Assessment Questions  1. SYMPTOM: \"What's the main symptom you're concerned about?\" (e.g., rash, itching, swelling, dryness)      Skin tags - previously evaluated by Dr. Wiley in 2023   2. LOCATION: \"Where is the  s/s located?\" (e.g., inside/outside, left/right)      Vulva and perineum  3. ONSET: \"When did the  s/s  start?\"      On going - 2 + years  4. PAIN: \"Is there any pain?\" If Yes, ask: \"How bad is it?\" (Scale: 1-10; mild, moderate, severe)      Denies  5. CAUSE: \"What do you think is causing the symptoms?\"      Diagnosed skin tag - previously evaluated and no intervention recommended.  6. OTHER SYMPTOMS: \"Do you have any other symptoms?\" (e.g., fever, vaginal bleeding, pain with urination)      Denies  7. PREGNANCY: \"Is there any chance you are pregnant?\" \"When was your last menstrual period?\"      S/p hysterectomy    Protocols used: Vulvar Symptoms-Adult-[place]    "

## 2025-06-10 ENCOUNTER — HOSPITAL ENCOUNTER (OUTPATIENT)
Dept: RADIOLOGY | Facility: HOSPITAL | Age: 80
Discharge: HOME | End: 2025-06-10
Payer: MEDICARE

## 2025-06-10 PROCEDURE — 74183 MRI ABD W/O CNTR FLWD CNTR: CPT

## 2025-06-10 PROCEDURE — A9585 GADOBUTROL INJECTION: HCPCS | Performed by: NURSE PRACTITIONER

## 2025-06-10 RX ORDER — GADOBUTROL 604.72 MG/ML
5 INJECTION INTRAVENOUS
Status: COMPLETED | OUTPATIENT
Start: 2025-06-10 | End: 2025-06-10

## 2025-06-10 RX ADMIN — GADOBUTROL 5 ML: 604.72 INJECTION INTRAVENOUS at 14:04

## 2025-06-17 ENCOUNTER — HOSPITAL ENCOUNTER (OUTPATIENT)
Dept: MAMMOGRAPHY | Facility: MEDICAL CENTER | Age: 80
Discharge: HOME | End: 2025-06-17
Attending: INTERNAL MEDICINE
Payer: MEDICARE

## 2025-06-17 VITALS — BODY MASS INDEX: 20.58 KG/M2 | HEIGHT: 61 IN | WEIGHT: 109 LBS

## 2025-06-17 DIAGNOSIS — Z12.31 ENCOUNTER FOR SCREENING MAMMOGRAM FOR MALIGNANT NEOPLASM OF BREAST: ICD-10-CM

## 2025-06-17 PROCEDURE — 77067 SCR MAMMO BI INCL CAD: CPT

## 2025-06-17 PROCEDURE — 77063 BREAST TOMOSYNTHESIS BI: CPT

## 2025-06-18 ENCOUNTER — RESULTS FOLLOW-UP (OUTPATIENT)
Dept: GASTROENTEROLOGY | Facility: MEDICAL CENTER | Age: 80
End: 2025-06-18

## 2025-07-08 ENCOUNTER — TELEPHONE (OUTPATIENT)
Age: 80
End: 2025-07-08

## 2025-07-08 ENCOUNTER — OFFICE VISIT (OUTPATIENT)
Dept: GASTROENTEROLOGY | Facility: MEDICAL CENTER | Age: 80
End: 2025-07-08
Payer: MEDICARE

## 2025-07-08 VITALS
TEMPERATURE: 97.9 F | WEIGHT: 109 LBS | HEIGHT: 61 IN | SYSTOLIC BLOOD PRESSURE: 128 MMHG | DIASTOLIC BLOOD PRESSURE: 73 MMHG | HEART RATE: 82 BPM | OXYGEN SATURATION: 98 % | BODY MASS INDEX: 20.58 KG/M2

## 2025-07-08 DIAGNOSIS — R13.19 ESOPHAGEAL DYSPHAGIA: ICD-10-CM

## 2025-07-08 DIAGNOSIS — R11.0 NAUSEA: ICD-10-CM

## 2025-07-08 DIAGNOSIS — K21.9 GASTROESOPHAGEAL REFLUX DISEASE WITHOUT ESOPHAGITIS: ICD-10-CM

## 2025-07-08 DIAGNOSIS — K86.2 PANCREATIC CYST: Primary | ICD-10-CM

## 2025-07-08 PROCEDURE — 99213 OFFICE O/P EST LOW 20 MIN: CPT | Performed by: NURSE PRACTITIONER

## 2025-07-08 RX ORDER — ONDANSETRON 4 MG/1
4 TABLET, FILM COATED ORAL EVERY 8 HOURS PRN
Qty: 30 TABLET | Refills: 3 | Status: SHIPPED | OUTPATIENT
Start: 2025-07-08

## 2025-07-08 NOTE — ASSESSMENT & PLAN NOTE
Standing history of GERD.  Has now controlled with diet.  She does occasionally use a bicarb if reflux/belching is bad.  She does not take omeprazole on a regular basis due to using doxycycline.  She does use aloe vera which also helps with reflux.  She watches what types of medication she takes and has some of the chemicals can affect her interstitial cystitis.  Denies any nausea or vomiting.  She does have a longstanding history of esophageal dysphagia secondary to her Sjogren's disease.  She did undergo an EGD 8/21 which was normal. A pH/manometry study was recommended at that time but was never done. She continues with intermittent bouts of esophageal dysphagia with solids that eventually advance with liquids.  Recently had an SLP eval.  Report reveals no aspiration 90% of the time.  She has not completed her video swallow with speech but will think about it before next visit. Recent EGD  7/24 noted a 5 cm hiatal hernia and erosive gastritis. Biopsies were negative for celiac, H. pylori and Moses's esophagus.     Antireflux diet  Aloe vera as needed  Follow-up in office in 6 months or sooner if needed

## 2025-07-08 NOTE — ASSESSMENT & PLAN NOTE
She is going on a cruise next week and is concerned about possible nausea.  Requesting ondansetron as needed  Orders:    ondansetron (ZOFRAN) 4 mg tablet; Take 1 tablet (4 mg total) by mouth every 8 (eight) hours as needed for nausea or vomiting

## 2025-07-08 NOTE — PROGRESS NOTES
Name: Sumi Lee      : 1945      MRN: [id number]  Encounter Provider: NOEMÍ Harmon  Encounter Date: 2025   Encounter department: [facility]  :  Assessment & Plan  Pancreatic cyst  History of stable pancreatic cyst.  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 Cyst in the tail accounts for the hypodensity seen in that location on recent CT. Based on institutional consensus and radiology literature, recommendation is for follow-up MRI/MRCP with and without intravenous contrast in 1 year.    Recall MRI/MRCP   Orders:    MRI abdomen w wo contrast and mrcp; Future    Gastroesophageal reflux disease without esophagitis  Esophageal dysphagia  Standing history of GERD.  Has now controlled with diet.  She does occasionally use a bicarb if reflux/belching is bad.  She does not take omeprazole on a regular basis due to using doxycycline.  She does use aloe vera which also helps with reflux.  She watches what types of medication she takes and has some of the chemicals can affect her interstitial cystitis.  Denies any nausea or vomiting.  She does have a longstanding history of esophageal dysphagia secondary to her Sjogren's disease.  She did undergo an EGD  which was normal. A pH/manometry study was recommended at that time but was never done. She continues with intermittent bouts of esophageal dysphagia with solids that eventually advance with liquids.  Recently had an SLP eval.  Report reveals no aspiration 90% of the time.  She has not completed her video swallow with speech but will think about it before next visit. Recent EGD   noted a 5 cm hiatal hernia and erosive gastritis. Biopsies were negative for celiac, H. pylori and Moses's esophagus.     Antireflux diet  Aloe vera as needed  Follow-up in office in 6 months or  sooner if needed         Nausea  She is going on a cruise next week and is concerned about possible nausea.  Requesting ondansetron as needed  Orders:    ondansetron (ZOFRAN) 4 mg tablet; Take 1 tablet (4 mg total) by mouth every 8 (eight) hours as needed for nausea or vomiting        History of Present Illness   Sumi Lee is a 79 y.o. female who presents follow-up.  She was last seen by myself 12/24 for dysphagia, GERD and pancreatic cyst.      HPI    History of chronic reflux and 5 cm hiatal hernia. Was on a PPI for few months which helped her symptoms but she is not able to take her doxycycline. She stopped the omeprazole.   She is reluctant to stay on any medications long-term.  She is using aloe vera and a bicarbonate as needed if her heartburn/belching flares.    She did receive a recent iron infusion as well as Reclast infusion.    History of a pancreatic cyst that has been stable.  Most recent MRI 6/25.  Recall MRI in 1 year.      Lab 6/25-BMP normal, GFR 91, ferritin 308 (normal 306.8), TSH normal.    Prior EGD/colonoscopy      EGD 7/24- Irregular Z-line; performed cold forceps biopsy  5 cm hiatal hernia  Moderate edematous, erythematous, ulcerated mucosa in the body of the stomach and antrum; performed cold forceps biopsy  The duodenum appeared normal.   Biopsies negative for celiac and H. pylori.  Biopsies also negative for Moses's esophagus.     EGD 8/21-Z-line, normal esophagus, stomach and duodenum.     Review of Systems   Constitutional:  Negative for chills and fever.   HENT:  Positive for trouble swallowing. Negative for ear pain and sore throat.    Eyes:  Negative for pain and visual disturbance.   Respiratory:  Negative for cough and shortness of breath.    Cardiovascular:  Negative for chest pain and palpitations.   Gastrointestinal:  Negative for abdominal pain and vomiting.   Genitourinary:  Negative for dysuria and hematuria.   Musculoskeletal:  Negative for arthralgias and back pain.    Skin:  Negative for color change and rash.   Neurological:  Negative for seizures and syncope.   All other systems reviewed and are negative.   A complete review of systems is negative other than that noted above in the HPI.      Current Medications[1]  Objective   There were no vitals taken for this visit.    Physical Exam  Vitals and nursing note reviewed.   Constitutional:       General: She is not in acute distress.     Appearance: She is well-developed.   HENT:      Head: Normocephalic and atraumatic.     Eyes:      Conjunctiva/sclera: Conjunctivae normal.       Cardiovascular:      Rate and Rhythm: Normal rate and regular rhythm.      Heart sounds: No murmur heard.  Pulmonary:      Effort: Pulmonary effort is normal. No respiratory distress.      Breath sounds: Normal breath sounds.   Abdominal:      General: Bowel sounds are normal.      Palpations: Abdomen is soft.      Tenderness: There is no abdominal tenderness.     Musculoskeletal:         General: No swelling.      Cervical back: Neck supple.     Skin:     General: Skin is warm and dry.      Capillary Refill: Capillary refill takes less than 2 seconds.     Neurological:      Mental Status: She is alert and oriented to person, place, and time.     Psychiatric:         Mood and Affect: Mood normal.            Lab Results: I personally reviewed relevant lab results.       Results for orders placed during the hospital encounter of 07/31/24    EGD    Impression  Irregular Z-line; performed cold forceps biopsy  5 cm hiatal hernia  Moderate edematous, erythematous, ulcerated mucosa in the body of the stomach and antrum; performed cold forceps biopsy  The duodenum appeared normal. Performed random biopsy to rule out celiac disease.      RECOMMENDATION:  Await pathology results  Start high dose ppi for 8 weeks.  Avoid NSAIDS            Diana M Jaiyeola, MD               [1]   Current Outpatient Medications   Medication Sig Dispense Refill    Ascorbic Acid  (Vitamin C) 500 MG CAPS Take by mouth in the morning (Patient not taking: Reported on 9/7/2023)      Bempedoic Acid (Nexletol) 180 MG TABS Take 180 mg by mouth 3 (three) times a week Mon -wed- fri (Patient taking differently: Take 180 mg by mouth in the morning Mon -wed- fri)      cevimeline (EVOXAC) 30 MG capsule Take 1 capsule twice a day by oral route for 30 days. (Patient not taking: Reported on 3/4/2024)      Cholecalciferol (VITAMIN D3 PO) Take by mouth in the morning.      clobetasol (TEMOVATE) 0.05 % ointment Apply topically 2 (two) times a day (Patient not taking: Reported on 3/4/2024) 45 g 2    doxycycline hyclate (VIBRAMYCIN) 50 mg capsule TAKE 1 CAPSULE BY MOUTH EVERY DAY FOR 7 DAYS THEN 1 CAP EVERY OTHER DAY (Patient not taking: Reported on 7/26/2024)      doxycycline monohydrate (MONODOX) 50 mg capsule Take 50 mg by mouth 3 (three) times a week      famotidine (PEPCID) 40 MG tablet Take 1 tablet (40 mg total) by mouth daily 90 tablet 1    gabapentin (NEURONTIN) 300 mg capsule Take 400 mg by mouth daily at bedtime      hydroxychloroquine (PLAQUENIL) 200 mg tablet Take 200 mg by mouth 2 (two) times a day (Patient not taking: Reported on 3/4/2024)      hydroxychloroquine (PLAQUENIL) 200 mg tablet Take 1 tablet twice a day by oral route for 30 days. (Patient not taking: Reported on 3/4/2024)      levothyroxine 50 mcg tablet Take 50 mcg by mouth in the morning.      methylPREDNISolone (Medrol) 16 mg tablet Medrol (José Luis) 4 mg tablets in a dose pack   Take as directed on packaging (Patient not taking: Reported on 3/4/2024)      Multiple Vitamin (MULTIVITAMIN ADULT PO) in the morning      omeprazole (PriLOSEC) 20 mg delayed release capsule Take 1 capsule (20 mg total) by mouth daily (Patient not taking: Reported on 6/3/2025) 90 capsule 1    ondansetron (ZOFRAN) 4 mg tablet Take 1 tablet (4 mg total) by mouth every 6 (six) hours 20 tablet 1    ondansetron (ZOFRAN) 4 mg tablet Take 1 tablet (4 mg total) by mouth  every 8 (eight) hours as needed for nausea or vomiting 30 tablet 1    predniSONE 20 mg tablet 3 tabs day 1-3, 2 tabs day 4-6, 1 tab day 7-9 (Patient not taking: Reported on 7/26/2024) 18 tablet 0    PreviDent 5000 Dry Mouth 1.1 % GEL Take 1 Application by mouth daily at bedtime       No current facility-administered medications for this visit.

## 2025-07-08 NOTE — TELEPHONE ENCOUNTER
PA for ondansetron 4 mg    SUBMITTED to [facility]    via    [x][employer]-Case ID #     [id number]    Payer: [employer] [facility] [employer] Part D   Phone: [phone]   Fax: [phone]       All office notes, labs and other pertaining documents and studies sent. Clinical questions answered. Awaiting determination from insurance company.     Turnaround time for your insurance to make a decision on your Prior Authorization can take 7-21 business days.

## 2025-07-08 NOTE — TELEPHONE ENCOUNTER
PA for ondansetron DENIED    Reason:(Screenshot if applicable)        Message sent to office clinical pool Yes    Denial letter scanned into Media Yes    We can gladly do an appeal but the process can take about 30-60 days to provide determination. Please have the office staff schedule a Peer to Peer at phone  . If an appeal is truly warranted please have Provider send clinical documentation to the PA department to support the appeal.    [phone]  **Please follow up with your patient regarding denial and next steps**

## 2025-07-09 NOTE — TELEPHONE ENCOUNTER
I do not want to appeal this.  Can you notify the patient of this.  I am not sure what the cost would be for her.  She just wanted a small prescription while she was on her cruise.  Thanks.

## 2025-07-28 ENCOUNTER — HOSPITAL ENCOUNTER (OUTPATIENT)
Dept: NEUROLOGY | Facility: CLINIC | Age: 80
Discharge: HOME | End: 2025-07-28
Attending: PSYCHIATRY & NEUROLOGY
Payer: MEDICARE

## 2025-07-28 DIAGNOSIS — M62.838 MUSCLE SPASM: ICD-10-CM

## 2025-07-28 PROCEDURE — 95911 NRV CNDJ TEST 9-10 STUDIES: CPT | Performed by: PSYCHIATRY & NEUROLOGY

## 2025-07-28 PROCEDURE — 95886 MUSC TEST DONE W/N TEST COMP: CPT | Performed by: PSYCHIATRY & NEUROLOGY

## 2025-07-29 ENCOUNTER — APPOINTMENT (OUTPATIENT)
Dept: LAB | Facility: MEDICAL CENTER | Age: 80
End: 2025-07-29
Payer: MEDICARE

## 2025-07-29 DIAGNOSIS — M62.838 MUSCLE SPASM: ICD-10-CM

## 2025-07-29 DIAGNOSIS — Z00.6 ENCOUNTER FOR EXAMINATION FOR NORMAL COMPARISON OR CONTROL IN CLINICAL RESEARCH PROGRAM: ICD-10-CM

## 2025-07-29 PROCEDURE — 36415 COLL VENOUS BLD VENIPUNCTURE: CPT

## 2025-07-29 PROCEDURE — 83520 IMMUNOASSAY QUANT NOS NONAB: CPT

## 2025-08-04 LAB — MISCELLANEOUS LAB TEST RESULT: NORMAL

## 2025-08-10 LAB
APOB+LDLR+PCSK9 GENE MUT ANL BLD/T: NOT DETECTED
BRCA1+BRCA2 DEL+DUP + FULL MUT ANL BLD/T: NOT DETECTED
MLH1+MSH2+MSH6+PMS2 GN DEL+DUP+FUL M: NOT DETECTED

## (undated) DEVICE — BIPOLAR CORD DISP

## (undated) DEVICE — INTENDED FOR TISSUE SEPARATION, AND OTHER PROCEDURES THAT REQUIRE A SHARP SURGICAL BLADE TO PUNCTURE OR CUT.: Brand: BARD-PARKER SAFETY BLADES SIZE 15, STERILE

## (undated) DEVICE — NEUROSTIMUL XTRNL 2 IN X 1.7 IN VERIFY

## (undated) DEVICE — 10FR FRAZIER SUCTION HANDLE: Brand: CARDINAL HEALTH

## (undated) DEVICE — CHLORAPREP HI-LITE 26ML ORANGE

## (undated) DEVICE — INTENDED FOR TISSUE SEPARATION, AND OTHER PROCEDURES THAT REQUIRE A SHARP SURGICAL BLADE TO PUNCTURE OR CUT.: Brand: BARD-PARKER SAFETY BLADES SIZE 11, STERILE

## (undated) DEVICE — ADHESIVE SKIN HIGH VISCOSITY EXOFIN 1ML

## (undated) DEVICE — CAST PADDING 4 IN SYNTHETIC NON-STRL

## (undated) DEVICE — GLOVE SRG BIOGEL 6.5

## (undated) DEVICE — TUBING SUCTION 5MM X 12 FT

## (undated) DEVICE — WEBRIL 6 IN UNSTERILE

## (undated) DEVICE — POSITION CUSHION INSERT LARGE PRONEVIEW

## (undated) DEVICE — OCCLUSIVE GAUZE STRIP,3% BISMUTH TRIBROMOPHENATE IN PETROLATUM BLEND: Brand: XEROFORM

## (undated) DEVICE — STRETCH BANDAGE: Brand: CURITY

## (undated) DEVICE — 3M™ TEGADERM™ CHG DRESSING 25/CARTON 4 CARTONS/CASE 1659: Brand: TEGADERM™

## (undated) DEVICE — NEEDLE 25G X 1 1/2

## (undated) DEVICE — SUT MONOCRYL 3-0 PS-2 18 IN Y497G

## (undated) DEVICE — DRAPE EQUIPMENT RF WAND

## (undated) DEVICE — DRAPE C-ARMOUR

## (undated) DEVICE — GLOVE SRG BIOGEL 7

## (undated) DEVICE — GLOVE INDICATOR PI UNDERGLOVE SZ 8 BLUE

## (undated) DEVICE — SUT VICRYL 3-0 SH 27 IN J416H

## (undated) DEVICE — SCD SEQUENTIAL COMPRESSION COMFORT SLEEVE MEDIUM KNEE LENGTH: Brand: KENDALL SCD

## (undated) DEVICE — PLUMEPEN PRO 10FT

## (undated) DEVICE — TELFA NON-ADHERENT ABSORBENT DRESSING: Brand: TELFA

## (undated) DEVICE — PROVE COVER: Brand: UNBRANDED

## (undated) DEVICE — CUFF TOURNIQUET 18 X 4 IN QUICK CONNECT DISP 1 BLADDER

## (undated) DEVICE — BETHLEHEM UNIVERSAL MINOR GEN: Brand: CARDINAL HEALTH

## (undated) DEVICE — REM POLYHESIVE ADULT PATIENT RETURN ELECTRODE: Brand: VALLEYLAB

## (undated) DEVICE — PROXIMATE SKIN STAPLERS (35 WIDE) CONTAINS 35 STAINLESS STEEL STAPLES (FIXED HEAD): Brand: PROXIMATE

## (undated) DEVICE — 3M™ IOBAN™ 2 ANTIMICROBIAL INCISE DRAPE 6650EZ: Brand: IOBAN™ 2

## (undated) DEVICE — PREP PAD BNS: Brand: CONVERTORS

## (undated) DEVICE — SUT ETHILON 4-0 PS-2 18 IN 1667H

## (undated) DEVICE — LUBRICANT SURGILUBE TUBE 4 OZ  FLIP TOP

## (undated) DEVICE — SYRINGE 10ML LL

## (undated) DEVICE — ACE WRAP 4 IN UNSTERILE

## (undated) DEVICE — GUARDIAN LVC: Brand: GUARDIAN

## (undated) DEVICE — GLOVE INDICATOR PI UNDERGLOVE SZ 7 BLUE

## (undated) DEVICE — 3M™ STERI-STRIP™ REINFORCED ADHESIVE SKIN CLOSURES, R1547, 1/2 IN X 4 IN (12 MM X 100 MM), 6 STRIPS/ENVELOPE: Brand: 3M™ STERI-STRIP™

## (undated) DEVICE — SUT MONOCRYL 4-0 PS-2 27 IN Y426H

## (undated) DEVICE — INTENDED FOR TISSUE SEPARATION, AND OTHER PROCEDURES THAT REQUIRE A SHARP SURGICAL BLADE TO PUNCTURE OR CUT.: Brand: BARD-PARKER ® CARBON RIB-BACK BLADES

## (undated) DEVICE — NEEDLE 18 G X 1 1/2

## (undated) DEVICE — GLOVE SRG BIOGEL ORTHOPEDIC 8

## (undated) DEVICE — BETHLEHEM UNIVERSAL  MIONR EXT: Brand: CARDINAL HEALTH

## (undated) DEVICE — UROCATCH BAG

## (undated) DEVICE — 2000CC GUARDIAN II: Brand: GUARDIAN

## (undated) DEVICE — PENCIL ELECTROSURG E-Z CLEAN -0035H

## (undated) DEVICE — DRAPE C-ARM X-RAY

## (undated) DEVICE — PACK TUR

## (undated) DEVICE — PROGRAMMER INTERSTIM X SMART KIT